# Patient Record
Sex: MALE | Race: WHITE | NOT HISPANIC OR LATINO | Employment: OTHER | URBAN - METROPOLITAN AREA
[De-identification: names, ages, dates, MRNs, and addresses within clinical notes are randomized per-mention and may not be internally consistent; named-entity substitution may affect disease eponyms.]

---

## 2018-02-08 ENCOUNTER — OFFICE VISIT (OUTPATIENT)
Dept: FAMILY MEDICINE CLINIC | Facility: CLINIC | Age: 83
End: 2018-02-08

## 2018-02-08 VITALS
DIASTOLIC BLOOD PRESSURE: 58 MMHG | OXYGEN SATURATION: 98 % | HEIGHT: 60 IN | BODY MASS INDEX: 35.03 KG/M2 | HEART RATE: 62 BPM | WEIGHT: 178.41 LBS | SYSTOLIC BLOOD PRESSURE: 112 MMHG

## 2018-02-08 DIAGNOSIS — E78.5 DYSLIPIDEMIA: ICD-10-CM

## 2018-02-08 DIAGNOSIS — I47.1 PAROXYSMAL SVT (SUPRAVENTRICULAR TACHYCARDIA) (HCC): ICD-10-CM

## 2018-02-08 DIAGNOSIS — I10 BENIGN ESSENTIAL HYPERTENSION: Primary | ICD-10-CM

## 2018-02-08 DIAGNOSIS — Z86.19 HISTORY OF HEPATITIS: ICD-10-CM

## 2018-02-08 PROCEDURE — 99213 OFFICE O/P EST LOW 20 MIN: CPT | Performed by: FAMILY MEDICINE

## 2018-02-08 RX ORDER — DIGOXIN 250 MCG
25 TABLET ORAL
COMMUNITY
End: 2018-05-25 | Stop reason: HOSPADM

## 2018-02-08 NOTE — PROGRESS NOTES
"Subjective   Mingo Aquino is a 83 y.o. male.     Chief Complaint   Patient presents with   • Hypertension     Fu Meds        History of Present Illness    Hypertension follow up. Doing well with current medication which he is taking as directed. No known high or low blood pressure episodes. No cardiovascular or neurological symptoms. Today's BP: 112/58 .He also continues to follow with his cardiologist.  They are prescribing the metoprolol.  He also has a history of SVT and remains on digoxin at low dose daily.  The medication record states he's on liquid digoxin, but this came over from the [employer] system I believe it is in error.  He states he's taking digoxin pills.    He gave blood sometime ago and stated that he was not allowed to give blood to other people because of a history of hepatitis.  No other information.  No symptoms.    Known history of hyperlipidemia some years ago.        The following portions of the patient's history were reviewed and updated as appropriate: allergies, current medications, past family history, past medical history, past social history, past surgical history and problem list.          Review of Systems   Constitutional: Negative.    Respiratory: Negative.    Cardiovascular: Negative.    Musculoskeletal: Negative.        Objective   Blood pressure 112/58, pulse 62, height 108 cm (42.52\"), weight 80.9 kg (178 lb 6.6 oz), SpO2 98 %.  Physical Exam   Constitutional: He appears well-developed and well-nourished. No distress.   Neck: No thyromegaly present.   Cardiovascular: Normal rate, regular rhythm, normal heart sounds and intact distal pulses.    Pulmonary/Chest: Effort normal and breath sounds normal.   Musculoskeletal: He exhibits no edema.   Skin: Skin is warm and dry.   Psychiatric: He has a normal mood and affect. His behavior is normal. Judgment and thought content normal.   Nursing note and vitals reviewed.      Assessment/Plan   Mingo was seen today for " hypertension.    Diagnoses and all orders for this visit:    Benign essential hypertension  -     Lipid Panel  -     Comprehensive Metabolic Panel  -     CBC & Differential    Paroxysmal SVT (supraventricular tachycardia)  -     CBC & Differential    History of hepatitis  -     Hepatitis B & C Profile  -     CBC & Differential    Dyslipidemia  -     Lipid Panel  -     Comprehensive Metabolic Panel  -     CBC & Differential      Hypertension.  Well-controlled.  Rectal SVT.  Control.  He continues with cardiology.  Next    History of hepatitis.  I'm checking hepatitis B and C profile today.  I'll see him back within 3 months for annual wellness visit

## 2018-02-12 DIAGNOSIS — B18.1 CHRONIC HEPATITIS B (HCC): Primary | ICD-10-CM

## 2018-02-12 LAB
ALBUMIN SERPL-MCNC: 4.5 G/DL (ref 3.5–5.2)
ALBUMIN/GLOB SERPL: 2 G/DL
ALP SERPL-CCNC: 65 U/L (ref 39–117)
ALT SERPL-CCNC: 16 U/L (ref 1–41)
AST SERPL-CCNC: 16 U/L (ref 1–40)
BASOPHILS # BLD AUTO: 0.18 10*3/MM3 (ref 0–0.2)
BASOPHILS NFR BLD AUTO: 1.9 % (ref 0–1.5)
BILIRUB SERPL-MCNC: 0.6 MG/DL (ref 0.1–1.2)
BUN SERPL-MCNC: 19 MG/DL (ref 8–23)
BUN/CREAT SERPL: 21.6 (ref 7–25)
CALCIUM SERPL-MCNC: 10.1 MG/DL (ref 8.6–10.5)
CHLORIDE SERPL-SCNC: 105 MMOL/L (ref 98–107)
CHOLEST SERPL-MCNC: 193 MG/DL (ref 0–200)
CO2 SERPL-SCNC: 26 MMOL/L (ref 22–29)
CREAT SERPL-MCNC: 0.88 MG/DL (ref 0.76–1.27)
EOSINOPHIL # BLD AUTO: 0.62 10*3/MM3 (ref 0–0.7)
EOSINOPHIL NFR BLD AUTO: 6.6 % (ref 0.3–6.2)
ERYTHROCYTE [DISTWIDTH] IN BLOOD BY AUTOMATED COUNT: 12.9 % (ref 11.5–14.5)
GFR SERPLBLD CREATININE-BSD FMLA CKD-EPI: 100 ML/MIN/1.73
GFR SERPLBLD CREATININE-BSD FMLA CKD-EPI: 83 ML/MIN/1.73
GLOBULIN SER CALC-MCNC: 2.2 GM/DL
GLUCOSE SERPL-MCNC: 80 MG/DL (ref 65–99)
HBV CORE AB SERPL QL IA: POSITIVE
HBV CORE IGM SERPL QL IA: NEGATIVE
HBV E AB SERPL QL IA: POSITIVE
HBV E AG SERPL QL IA: NEGATIVE
HBV SURFACE AB SER QL: REACTIVE
HBV SURFACE AG SERPL QL IA: NEGATIVE
HCT VFR BLD AUTO: 49.9 % (ref 40.4–52.2)
HCV AB S/CO SERPL IA: <0.1 S/CO RATIO (ref 0–0.9)
HDLC SERPL-MCNC: 42 MG/DL (ref 40–60)
HGB BLD-MCNC: 16 G/DL (ref 13.7–17.6)
IMM GRANULOCYTES # BLD: 0.11 10*3/MM3 (ref 0–0.03)
IMM GRANULOCYTES NFR BLD: 1.2 % (ref 0–0.5)
LABORATORY COMMENT REPORT: NORMAL
LDLC SERPL CALC-MCNC: 121 MG/DL (ref 0–100)
LYMPHOCYTES # BLD AUTO: 2.59 10*3/MM3 (ref 0.9–4.8)
LYMPHOCYTES NFR BLD AUTO: 27.7 % (ref 19.6–45.3)
MCH RBC QN AUTO: 31.5 PG (ref 27–32.7)
MCHC RBC AUTO-ENTMCNC: 32.1 G/DL (ref 32.6–36.4)
MCV RBC AUTO: 98.2 FL (ref 79.8–96.2)
MONOCYTES # BLD AUTO: 1.26 10*3/MM3 (ref 0.2–1.2)
MONOCYTES NFR BLD AUTO: 13.5 % (ref 5–12)
NEUTROPHILS # BLD AUTO: 4.59 10*3/MM3 (ref 1.9–8.1)
NEUTROPHILS NFR BLD AUTO: 49.1 % (ref 42.7–76)
PLATELET # BLD AUTO: 260 10*3/MM3 (ref 140–500)
POTASSIUM SERPL-SCNC: 4.9 MMOL/L (ref 3.5–5.2)
PROT SERPL-MCNC: 6.7 G/DL (ref 6–8.5)
RBC # BLD AUTO: 5.08 10*6/MM3 (ref 4.6–6)
SODIUM SERPL-SCNC: 144 MMOL/L (ref 136–145)
TRIGL SERPL-MCNC: 148 MG/DL (ref 0–150)
VLDLC SERPL CALC-MCNC: 29.6 MG/DL (ref 5–40)
WBC # BLD AUTO: 9.35 10*3/MM3 (ref 4.5–10.7)

## 2018-02-12 NOTE — PROGRESS NOTES
"Phone call patient.  Hepatitis B tests are positive for probable old, inactive, occult infection.  His LFTs are normal.  I'm ordering a hepatitis B DNA quantitative analysis.  In addition I did offer him a referral to a \"liver doctor\".  However he would like to wait on that.  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  "

## 2018-02-14 LAB
HBV DNA SERPL NAA+PROBE-ACNC: NORMAL IU/ML
Lab: NORMAL
REF LAB TEST REF RANGE: NORMAL
REQUEST PROBLEM: ABNORMAL
WRITTEN AUTHORIZATION: NORMAL

## 2018-02-17 ENCOUNTER — RESULTS ENCOUNTER (OUTPATIENT)
Dept: FAMILY MEDICINE CLINIC | Facility: CLINIC | Age: 83
End: 2018-02-17

## 2018-02-17 DIAGNOSIS — B18.1 CHRONIC HEPATITIS B (HCC): ICD-10-CM

## 2018-02-17 LAB
HBV DNA SERPL NAA+PROBE-ACNC: NORMAL IU/ML
HBV DNA SERPL NAA+PROBE-LOG IU: NORMAL LOG10IU/ML
REF LAB TEST REF RANGE: NORMAL

## 2018-02-19 NOTE — PROGRESS NOTES
The hepatitis B DNA level was essentially 0, not detected.  This means probable very old hepatitis B infection, non-active.  I recommend we continue to monitor hepatitis studies yearly.

## 2018-04-18 ENCOUNTER — TRANSCRIBE ORDERS (OUTPATIENT)
Dept: ADMINISTRATIVE | Facility: HOSPITAL | Age: 83
End: 2018-04-18

## 2018-04-18 ENCOUNTER — HOSPITAL ENCOUNTER (EMERGENCY)
Facility: HOSPITAL | Age: 83
Discharge: HOME | End: 2018-04-18
Attending: FAMILY MEDICINE | Admitting: FAMILY MEDICINE

## 2018-04-18 ENCOUNTER — APPOINTMENT (OUTPATIENT)
Dept: GENERAL RADIOLOGY | Facility: HOSPITAL | Age: 83
End: 2018-04-18

## 2018-04-18 ENCOUNTER — APPOINTMENT (OUTPATIENT)
Dept: CT IMAGING | Facility: HOSPITAL | Age: 83
End: 2018-04-18

## 2018-04-18 VITALS
DIASTOLIC BLOOD PRESSURE: 88 MMHG | SYSTOLIC BLOOD PRESSURE: 156 MMHG | HEART RATE: 56 BPM | HEIGHT: 72 IN | TEMPERATURE: 96.2 F | BODY MASS INDEX: 23.7 KG/M2 | RESPIRATION RATE: 21 BRPM | WEIGHT: 175 LBS | OXYGEN SATURATION: 96 %

## 2018-04-18 DIAGNOSIS — K86.89 PANCREATIC MASS: Primary | ICD-10-CM

## 2018-04-18 DIAGNOSIS — J40 BRONCHITIS: Primary | ICD-10-CM

## 2018-04-18 DIAGNOSIS — J32.9 CHRONIC SINUSITIS, UNSPECIFIED LOCATION: ICD-10-CM

## 2018-04-18 LAB
ALBUMIN SERPL-MCNC: 4 G/DL (ref 3.5–5.2)
ALBUMIN/GLOB SERPL: 1.3 G/DL
ALP SERPL-CCNC: 65 U/L (ref 39–117)
ALT SERPL W P-5'-P-CCNC: 15 U/L (ref 1–41)
ANION GAP SERPL CALCULATED.3IONS-SCNC: 9.8 MMOL/L
AST SERPL-CCNC: 14 U/L (ref 1–40)
B PERT DNA SPEC QL NAA+PROBE: NOT DETECTED
BASOPHILS # BLD AUTO: 0.19 10*3/MM3 (ref 0–0.2)
BASOPHILS NFR BLD AUTO: 1.7 % (ref 0–1.5)
BILIRUB SERPL-MCNC: 0.7 MG/DL (ref 0.1–1.2)
BUN BLD-MCNC: 19 MG/DL (ref 8–23)
BUN/CREAT SERPL: 21.8 (ref 7–25)
C PNEUM DNA NPH QL NAA+NON-PROBE: NOT DETECTED
CALCIUM SPEC-SCNC: 10 MG/DL (ref 8.6–10.5)
CHLORIDE SERPL-SCNC: 104 MMOL/L (ref 98–107)
CO2 SERPL-SCNC: 28.2 MMOL/L (ref 22–29)
CREAT BLD-MCNC: 0.87 MG/DL (ref 0.76–1.27)
DEPRECATED RDW RBC AUTO: 42.2 FL (ref 37–54)
EOSINOPHIL # BLD AUTO: 1.32 10*3/MM3 (ref 0–0.7)
EOSINOPHIL NFR BLD AUTO: 12 % (ref 0.3–6.2)
ERYTHROCYTE [DISTWIDTH] IN BLOOD BY AUTOMATED COUNT: 12.2 % (ref 11.5–14.5)
FLUAV H1 2009 PAND RNA NPH QL NAA+PROBE: NOT DETECTED
FLUAV H1 HA GENE NPH QL NAA+PROBE: NOT DETECTED
FLUAV H3 RNA NPH QL NAA+PROBE: NOT DETECTED
FLUAV SUBTYP SPEC NAA+PROBE: NOT DETECTED
FLUBV RNA ISLT QL NAA+PROBE: NOT DETECTED
GFR SERPL CREATININE-BSD FRML MDRD: 84 ML/MIN/1.73
GLOBULIN UR ELPH-MCNC: 3 GM/DL
GLUCOSE BLD-MCNC: 100 MG/DL (ref 65–99)
HADV DNA SPEC NAA+PROBE: NOT DETECTED
HCOV 229E RNA SPEC QL NAA+PROBE: NOT DETECTED
HCOV HKU1 RNA SPEC QL NAA+PROBE: NOT DETECTED
HCOV NL63 RNA SPEC QL NAA+PROBE: NOT DETECTED
HCOV OC43 RNA SPEC QL NAA+PROBE: NOT DETECTED
HCT VFR BLD AUTO: 51.7 % (ref 40.4–52.2)
HGB BLD-MCNC: 17 G/DL (ref 13.7–17.6)
HMPV RNA NPH QL NAA+NON-PROBE: NOT DETECTED
HOLD SPECIMEN: NORMAL
HOLD SPECIMEN: NORMAL
HPIV1 RNA SPEC QL NAA+PROBE: NOT DETECTED
HPIV2 RNA SPEC QL NAA+PROBE: NOT DETECTED
HPIV3 RNA NPH QL NAA+PROBE: NOT DETECTED
HPIV4 P GENE NPH QL NAA+PROBE: NOT DETECTED
IMM GRANULOCYTES # BLD: 0.05 10*3/MM3 (ref 0–0.03)
IMM GRANULOCYTES NFR BLD: 0.5 % (ref 0–0.5)
LYMPHOCYTES # BLD AUTO: 2.52 10*3/MM3 (ref 0.9–4.8)
LYMPHOCYTES NFR BLD AUTO: 23 % (ref 19.6–45.3)
M PNEUMO IGG SER IA-ACNC: NOT DETECTED
MCH RBC QN AUTO: 31.5 PG (ref 27–32.7)
MCHC RBC AUTO-ENTMCNC: 32.9 G/DL (ref 32.6–36.4)
MCV RBC AUTO: 95.7 FL (ref 79.8–96.2)
MONOCYTES # BLD AUTO: 1.01 10*3/MM3 (ref 0.2–1.2)
MONOCYTES NFR BLD AUTO: 9.2 % (ref 5–12)
NEUTROPHILS # BLD AUTO: 5.89 10*3/MM3 (ref 1.9–8.1)
NEUTROPHILS NFR BLD AUTO: 53.6 % (ref 42.7–76)
NT-PROBNP SERPL-MCNC: 162.1 PG/ML (ref 0–1800)
PLATELET # BLD AUTO: 252 10*3/MM3 (ref 140–500)
PMV BLD AUTO: 11.1 FL (ref 6–12)
POTASSIUM BLD-SCNC: 4.3 MMOL/L (ref 3.5–5.2)
PROT SERPL-MCNC: 7 G/DL (ref 6–8.5)
RBC # BLD AUTO: 5.4 10*6/MM3 (ref 4.6–6)
RHINOVIRUS RNA SPEC NAA+PROBE: NOT DETECTED
RSV RNA NPH QL NAA+NON-PROBE: NOT DETECTED
SODIUM BLD-SCNC: 142 MMOL/L (ref 136–145)
TROPONIN T SERPL-MCNC: <0.01 NG/ML (ref 0–0.03)
WBC NRBC COR # BLD: 10.98 10*3/MM3 (ref 4.5–10.7)
WHOLE BLOOD HOLD SPECIMEN: NORMAL
WHOLE BLOOD HOLD SPECIMEN: NORMAL

## 2018-04-18 PROCEDURE — 87581 M.PNEUMON DNA AMP PROBE: CPT | Performed by: FAMILY MEDICINE

## 2018-04-18 PROCEDURE — 87633 RESP VIRUS 12-25 TARGETS: CPT | Performed by: FAMILY MEDICINE

## 2018-04-18 PROCEDURE — 71046 X-RAY EXAM CHEST 2 VIEWS: CPT

## 2018-04-18 PROCEDURE — 93010 ELECTROCARDIOGRAM REPORT: CPT | Performed by: INTERNAL MEDICINE

## 2018-04-18 PROCEDURE — 84484 ASSAY OF TROPONIN QUANT: CPT | Performed by: FAMILY MEDICINE

## 2018-04-18 PROCEDURE — 83880 ASSAY OF NATRIURETIC PEPTIDE: CPT | Performed by: FAMILY MEDICINE

## 2018-04-18 PROCEDURE — 85025 COMPLETE CBC W/AUTO DIFF WBC: CPT | Performed by: FAMILY MEDICINE

## 2018-04-18 PROCEDURE — 94640 AIRWAY INHALATION TREATMENT: CPT

## 2018-04-18 PROCEDURE — 71260 CT THORAX DX C+: CPT

## 2018-04-18 PROCEDURE — 93005 ELECTROCARDIOGRAM TRACING: CPT | Performed by: FAMILY MEDICINE

## 2018-04-18 PROCEDURE — 93005 ELECTROCARDIOGRAM TRACING: CPT

## 2018-04-18 PROCEDURE — 99284 EMERGENCY DEPT VISIT MOD MDM: CPT

## 2018-04-18 PROCEDURE — 80053 COMPREHEN METABOLIC PANEL: CPT | Performed by: FAMILY MEDICINE

## 2018-04-18 PROCEDURE — 25010000002 IOPAMIDOL 61 % SOLUTION: Performed by: FAMILY MEDICINE

## 2018-04-18 PROCEDURE — 87798 DETECT AGENT NOS DNA AMP: CPT | Performed by: FAMILY MEDICINE

## 2018-04-18 PROCEDURE — 87486 CHLMYD PNEUM DNA AMP PROBE: CPT | Performed by: FAMILY MEDICINE

## 2018-04-18 RX ORDER — IPRATROPIUM BROMIDE 42 UG/1
SPRAY, METERED NASAL
COMMUNITY
Start: 2018-02-07 | End: 2018-05-22

## 2018-04-18 RX ORDER — IPRATROPIUM BROMIDE AND ALBUTEROL SULFATE 2.5; .5 MG/3ML; MG/3ML
3 SOLUTION RESPIRATORY (INHALATION) ONCE
Status: COMPLETED | OUTPATIENT
Start: 2018-04-18 | End: 2018-04-18

## 2018-04-18 RX ORDER — PREDNISONE 20 MG/1
20 TABLET ORAL 3 TIMES DAILY
Qty: 25 TABLET | Refills: 0 | Status: SHIPPED | OUTPATIENT
Start: 2018-04-18 | End: 2018-05-09

## 2018-04-18 RX ORDER — SODIUM CHLORIDE 0.9 % (FLUSH) 0.9 %
10 SYRINGE (ML) INJECTION AS NEEDED
Status: DISCONTINUED | OUTPATIENT
Start: 2018-04-18 | End: 2018-04-18 | Stop reason: HOSPADM

## 2018-04-18 RX ADMIN — IOPAMIDOL 75 ML: 612 INJECTION, SOLUTION INTRAVENOUS at 09:55

## 2018-04-18 RX ADMIN — IPRATROPIUM BROMIDE AND ALBUTEROL SULFATE 3 ML: .5; 3 SOLUTION RESPIRATORY (INHALATION) at 09:23

## 2018-04-18 NOTE — DISCHARGE INSTRUCTIONS
You should improve over the next few days.  If not, or you worsen or develop new symptoms, return or see Dr Luque promptly

## 2018-04-18 NOTE — ED PROVIDER NOTES
EMERGENCY DEPARTMENT ENCOUNTER    CHIEF COMPLAINT  Chief Complaint: SOA  History given by: pt   History limited by: none  Room Number: 34/34  PMD: MD Dr. Rebel Crespo, ENT  Dr. Sanders, urologist     HPI:  Pt is a 83 y.o. male who presents complaining of SOA and cough for the past month, worsened this AM. Pt states that his symptoms are worse on waking and his SOA and coughing seems to be improved with sitting up. Pt states that he saw an ENT several months ago with findings of a nasal fungus via MRI, that the ENT cleaned his sinuses out. Pt states that he had his packing removed 1 week after this procedure, and developed productive cough with brown sputum after having this packing removed. Pt also c/o difficulty sleeping due to his cough. Pt denies CP, abd pain, N/V. Pt states that he lives alone, and has an upcoming MRI to evaluate a pancreatis lesion next week. Pt denies a Hx of CHF.      Duration:  One month   Onset: gradual   Timing: constant   Quality: SOA   Intensity/Severity: moderate   Progression: worsened this AM  Associated Symptoms: cough, difficulty sleeping  Aggravating Factors: none stated   Alleviating Factors: none stated   Previous Episodes: no prior episodes reported   Treatment before arrival: Pt was seen by an ENT and had his sinuses cleaned.     PAST MEDICAL HISTORY  Active Ambulatory Problems     Diagnosis Date Noted   • APC (atrial premature contractions) 11/21/2012   • Benign essential hypertension 11/21/2012   • Dyslipidemia 01/14/2016   • Paroxysmal SVT (supraventricular tachycardia) 11/21/2012   • History of hepatitis 02/08/2018   • Chronic hepatitis B 02/12/2018     Resolved Ambulatory Problems     Diagnosis Date Noted   • No Resolved Ambulatory Problems     Past Medical History:   Diagnosis Date   • Arrhythmia    • Cancer    • Hypertension        PAST SURGICAL HISTORY  Past Surgical History:   Procedure Laterality Date   • HERNIA REPAIR     • NASAL SINUS SURGERY Right    •  PROSTATECTOMY         FAMILY HISTORY  History reviewed. No pertinent family history.    SOCIAL HISTORY  Social History     Social History   • Marital status: Single     Spouse name: N/A   • Number of children: N/A   • Years of education: N/A     Occupational History   • Not on file.     Social History Main Topics   • Smoking status: Never Smoker   • Smokeless tobacco: Never Used   • Alcohol use Yes      Comment: Occasional   • Drug use: No   • Sexual activity: Not on file     Other Topics Concern   • Not on file     Social History Narrative   • No narrative on file       ALLERGIES  Review of patient's allergies indicates no known allergies.    REVIEW OF SYSTEMS  Review of Systems   Constitutional: Negative for activity change, appetite change and fever.   HENT: Negative for congestion and sore throat.    Eyes: Negative.    Respiratory: Positive for cough (with brown sputum), chest tightness and shortness of breath.    Cardiovascular: Negative for chest pain and leg swelling.   Gastrointestinal: Negative for abdominal pain, diarrhea and vomiting.   Endocrine: Negative.    Genitourinary: Negative for decreased urine volume and dysuria.   Musculoskeletal: Negative for neck pain.   Skin: Negative for rash and wound.   Allergic/Immunologic: Negative.    Neurological: Negative for weakness, numbness and headaches.   Hematological: Negative.    Psychiatric/Behavioral: Positive for sleep disturbance (secondary to cough/SOA).   All other systems reviewed and are negative.      PHYSICAL EXAM  ED Triage Vitals   Temp Heart Rate Resp BP SpO2   04/18/18 0750 04/18/18 0750 04/18/18 0750 04/18/18 0755 04/18/18 0750   96.2 °F (35.7 °C) 99 20 152/96 92 %      Temp src Heart Rate Source Patient Position BP Location FiO2 (%)   04/18/18 0750 -- 04/18/18 0755 -- --   Tympanic  Sitting         Physical Exam   Constitutional: He is oriented to person, place, and time and well-developed, well-nourished, and in no distress.   HENT:   Head:  Normocephalic and atraumatic.   Eyes: EOM are normal. Pupils are equal, round, and reactive to light.   Neck: Normal range of motion. Neck supple.   Cardiovascular: Normal rate, regular rhythm and normal heart sounds.    Pulmonary/Chest: Effort normal. No respiratory distress. He has wheezes (bilateral).   Abdominal: Soft. There is no tenderness. There is no rebound and no guarding.   Musculoskeletal: Normal range of motion. He exhibits no edema.   Neurological: He is alert and oriented to person, place, and time. He has normal sensation and normal strength.   Skin: Skin is warm and dry.   Psychiatric: Mood and affect normal.   Nursing note and vitals reviewed.      LAB RESULTS  Lab Results (last 24 hours)     Procedure Component Value Units Date/Time    CBC & Differential [[id number]] Collected:  04/18/18 0907    Specimen:  Blood Updated:  04/18/18 0929    Narrative:       The following orders were created for panel order CBC & Differential.  Procedure                               Abnormality         Status                     ---------                               -----------         ------                     CBC Auto Differential[[id number]]        Abnormal            Final result                 Please view results for these tests on the individual orders.    Comprehensive Metabolic Panel [[id number]]  (Abnormal) Collected:  04/18/18 0907    Specimen:  Blood Updated:  04/18/18 0944     Glucose 100 (H) mg/dL      BUN 19 mg/dL      Creatinine 0.87 mg/dL      Sodium 142 mmol/L      Potassium 4.3 mmol/L      Chloride 104 mmol/L      CO2 28.2 mmol/L      Calcium 10.0 mg/dL      Total Protein 7.0 g/dL      Albumin 4.00 g/dL      ALT (SGPT) 15 U/L      AST (SGOT) 14 U/L      Alkaline Phosphatase 65 U/L      Total Bilirubin 0.7 mg/dL      eGFR Non African Amer 84 mL/min/1.73      Globulin 3.0 gm/dL      A/G Ratio 1.3 g/dL      BUN/Creatinine Ratio 21.8     Anion Gap 9.8 mmol/L     Narrative:       The MDRD GFR formula  is only valid for adults with stable renal function between ages 18 and 70.    BNP [[id number]]  (Normal) Collected:  04/18/18 0907    Specimen:  Blood Updated:  04/18/18 0943     proBNP 162.1 pg/mL     Narrative:       Among patients with dyspnea, NT-proBNP is highly sensitive for the detection of acute congestive heart failure. In addition NT-proBNP of <300 pg/ml effectively rules out acute congestive heart failure with 99% negative predictive value.    Troponin [[id number]]  (Normal) Collected:  04/18/18 0907    Specimen:  Blood Updated:  04/18/18 0944     Troponin T <0.010 ng/mL     Narrative:       Troponin T Reference Ranges:  Less than 0.03 ng/mL:    Negative for AMI  0.03 to 0.09 ng/mL:      Indeterminant for AMI  Greater than 0.09 ng/mL: Positive for AMI    CBC Auto Differential [[id number]]  (Abnormal) Collected:  04/18/18 0907    Specimen:  Blood Updated:  04/18/18 0929     WBC 10.98 (H) 10*3/mm3      RBC 5.40 10*6/mm3      Hemoglobin 17.0 g/dL      Hematocrit 51.7 %      MCV 95.7 fL      MCH 31.5 pg      MCHC 32.9 g/dL      RDW 12.2 %      RDW-SD 42.2 fl      MPV 11.1 fL      Platelets 252 10*3/mm3      Neutrophil % 53.6 %      Lymphocyte % 23.0 %      Monocyte % 9.2 %      Eosinophil % 12.0 (H) %      Basophil % 1.7 (H) %      Immature Grans % 0.5 %      Neutrophils, Absolute 5.89 10*3/mm3      Lymphocytes, Absolute 2.52 10*3/mm3      Monocytes, Absolute 1.01 10*3/mm3      Eosinophils, Absolute 1.32 (H) 10*3/mm3      Basophils, Absolute 0.19 10*3/mm3      Immature Grans, Absolute 0.05 (H) 10*3/mm3     Respiratory Panel, PCR - Swab, Nasopharynx [[id number]]  (Normal) Collected:  04/18/18 0919    Specimen:  Swab from Nasopharynx Updated:  04/18/18 1243     ADENOVIRUS, PCR Not Detected     Coronavirus 229E Not Detected     Coronavirus HKU1 Not Detected     Coronavirus NL63 Not Detected     Coronavirus OC43 Not Detected     Human Metapneumovirus Not Detected     Human Rhinovirus/Enterovirus Not Detected      Influenza B PCR Not Detected     Parainfluenza Virus 1 Not Detected     Parainfluenza Virus 2 Not Detected     Parainfluenza Virus 3 Not Detected     Parainfluenza Virus 4 Not Detected     Bordetella pertussis pcr Not Detected     Influenza A H1 2009 PCR Not Detected     Chlamydophila pneumoniae PCR Not Detected     Mycoplasma pneumo by PCR Not Detected     Influenza A PCR Not Detected     Influenza A H3 Not Detected     Influenza A H1 Not Detected     RSV, PCR Not Detected          I ordered the above labs and reviewed the results    RADIOLOGY  CT Chest With Contrast   Final Result   1. Mild bilateral hilar brenie enlargement is without clear etiology.   3-month follow-up CT could be obtained to evaluate for stability.   2. Old granulomatous disease. No infiltrate or pleural effusion.   3. Mild left greater than right basilar atelectasis.       Radiation dose reduction techniques were utilized, including automated   exposure control and exposure modulation based on body size.       This report was finalized on 4/18/2018 10:58 AM by Dr. Saud Delatorre MD.          XR Chest 2 View   Final Result       CXR: negative acute    I ordered the above noted radiological studies. Interpreted by radiologist. Reviewed by me in PACS.       PROCEDURES  Procedures  EKG           EKG time: 0759  Rhythm/Rate: sinus rhythm 77  P waves and CA: hypertrophic P waves   QRS, axis: LVH  ST and T waves: nonspecific ST changes      Interpreted Contemporaneously by me, independently viewed  changed compared to prior 7/24/06  LVH is more pronounced     PROGRESS AND CONSULTS  ED Course   0909  Ordered labs and CT chest for further evaluation.   0910  Ordered duo-neb for a breathing treatment.   1107  Rechecked pt, who is resting comfortably, and states that his SOA was mildly improved with the breathing treatment. Discussed the pt's CT chest, which showed bernie enlargement but no pneumonia, and that we are waiting on the pt's respiratory  panel. Plan to call pulmonology. Pt understands and agrees with the plan, and all questions were answered.   1243  Ordered call to pulmonology.   1247  Ordered call to ENT, Dr. Luque  1315  Received a call from Dr. Mayen and discussed pt's case. Dr. aMyen will have ENT call back after reviewing the pt's recent sinus surgery.   1345  Received a call from Dr. Luque and discussed pt's case. Dr. Luque said the patient had terrific sinusitis and his coughing was from drip down from above.  He suggested d/c on steroids for the pt's chronic sinusitis and arrange close follow up.  1349  Rechecked pt, who is resting comfortably. Discussed conversation with Dr. Luque. Plan to d/c the pt on steroids. Pt understands and agrees with the plan, and all questions were answered.     MEDICAL DECISION MAKING  Results were reviewed/discussed with the patient and they were also made aware of online access. Pt also made aware that some labs, such as cultures, will not be resulted during ER visit and follow up with PMD is necessary.     MDM  Number of Diagnoses or Management Options  Bronchitis:      Amount and/or Complexity of Data Reviewed  Clinical lab tests: ordered and reviewed (Respiratory Panel: negative,troponin <0.010, .1)  Tests in the radiology section of CPT®: ordered and reviewed (CT chest: hilar bernie enlargement. CXR: negative acute)  Tests in the medicine section of CPT®: reviewed and ordered (See procedures section for EKG)  Decide to obtain previous medical records or to obtain history from someone other than the patient: yes (Pt's records in EPIC. Records provided by patient. )  Review and summarize past medical records: yes (Reviewed report of pt's previous CT that showed a questionable pancreas lesion and patchy consolidation in the RUL. )  Discuss the patient with other providers: yes (Dr. Soria (ENT), Dr. Luque (ENT))    Patient Progress  Patient progress: stable         DIAGNOSIS  Final diagnoses:    Bronchitis   Chronic sinusitis, unspecified location       DISPOSITION  DISCHARGE    Patient discharged in stable condition.    Reviewed implications of results, diagnosis, meds, responsibility to follow up, warning signs and symptoms of possible worsening, potential complications and reasons to return to ER.    Patient/Family voiced understanding of above instructions.    Discussed plan for discharge, as there is no emergent indication for admission. Patient referred to primary care provider for BP management due to today's BP. Pt/family is agreeable and understands need for follow up and repeat testing.  Pt is aware that discharge does not mean that nothing is wrong but it indicates no emergency is present that requires admission and they must continue care with follow-up as given below or physician of their choice.     FOLLOW-UP  Petar Luque MD  [address] [address]  [phone]    Call in 1 week  For Follow up         Medication List      New Prescriptions    predniSONE 20 MG tablet  Commonly known as:  DELTASONE  Take 1 tablet by mouth 3 (Three) Times a Day. After 5 days go to 2 times a   day              Latest Documented Vital Signs:  As of 7:38 PM  BP- 156/88 HR- 56 Temp- 96.2 °F (35.7 °C) (Tympanic) O2 sat- 96%    --  Documentation assistance provided by mo Rawls for Dr. Rodriguez.  Information recorded by the scribe was done at my direction and has been verified and validated by me.         Iggy Rawls  04/18/18 5391       Silverio Rodriguez MD  04/18/18 7491

## 2018-04-23 ENCOUNTER — TELEPHONE (OUTPATIENT)
Dept: SOCIAL WORK | Facility: HOSPITAL | Age: 83
End: 2018-04-23

## 2018-04-23 NOTE — TELEPHONE ENCOUNTER
ER F/U phone call:   Pt stated that he is doing better. Had MRI on 4-26-18 and he is to follow up with his PCP on 5-9-18. No other questions or concerns voiced at this time. Sara Aguilar RN

## 2018-04-26 ENCOUNTER — APPOINTMENT (OUTPATIENT)
Dept: MRI IMAGING | Facility: HOSPITAL | Age: 83
End: 2018-04-26
Attending: UROLOGY

## 2018-05-09 ENCOUNTER — OFFICE VISIT (OUTPATIENT)
Dept: FAMILY MEDICINE CLINIC | Facility: CLINIC | Age: 83
End: 2018-05-09

## 2018-05-09 VITALS
BODY MASS INDEX: 22.5 KG/M2 | WEIGHT: 166.1 LBS | HEART RATE: 78 BPM | HEIGHT: 72 IN | OXYGEN SATURATION: 98 % | TEMPERATURE: 97.9 F | SYSTOLIC BLOOD PRESSURE: 100 MMHG | DIASTOLIC BLOOD PRESSURE: 56 MMHG

## 2018-05-09 DIAGNOSIS — I10 BENIGN ESSENTIAL HYPERTENSION: ICD-10-CM

## 2018-05-09 DIAGNOSIS — Z00.00 MEDICARE ANNUAL WELLNESS VISIT, SUBSEQUENT: Primary | ICD-10-CM

## 2018-05-09 DIAGNOSIS — D37.9 NEOPLASM OF UNCERTAIN BEHAVIOR OF DIGESTIVE ORGAN: ICD-10-CM

## 2018-05-09 DIAGNOSIS — I49.1 APC (ATRIAL PREMATURE CONTRACTIONS): ICD-10-CM

## 2018-05-09 DIAGNOSIS — B18.1 CHRONIC HEPATITIS B (HCC): ICD-10-CM

## 2018-05-09 DIAGNOSIS — F34.1 DYSTHYMIA: ICD-10-CM

## 2018-05-09 PROBLEM — K86.89 PANCREATIC MASS: Status: ACTIVE | Noted: 2018-05-09

## 2018-05-09 PROCEDURE — G0439 PPPS, SUBSEQ VISIT: HCPCS | Performed by: FAMILY MEDICINE

## 2018-05-09 PROCEDURE — 99213 OFFICE O/P EST LOW 20 MIN: CPT | Performed by: FAMILY MEDICINE

## 2018-05-09 PROCEDURE — G0009 ADMIN PNEUMOCOCCAL VACCINE: HCPCS | Performed by: FAMILY MEDICINE

## 2018-05-09 PROCEDURE — 90670 PCV13 VACCINE IM: CPT | Performed by: FAMILY MEDICINE

## 2018-05-09 NOTE — PROGRESS NOTES
Subjective   Mingo Aquino is a 83 y.o. male.     Annual Exam (AWV); Depression; and Fatigue    History of Present Illness     Seen by urology for possible kidney issue.  MRI of the abdomen was performed.  It showed some rash and able pancreatic cysts or masses.  He has an appointment with Dr. oCllier tomorrow.  Patient has had some weight loss he can't quite explain, about 10 pounds.  He has had some disc depression symptoms, see below.  He otherwise feels fine.  Good appetite.  No bowel movement changes.    Depressed mood.  Decreased interest in pleasurable activities.  He's feeling older and lonely he states.  He misses his family and friends.  However the movements do not seem to get in the way of day-to-day activities.  He still goes out with friends for lunch.  He is re-modeling his condo which he enjoys.  He's had some fatigue.    Hypertension follow up. Doing well with current medication which he is taking as directed. No known high or low blood pressure episodes. No cardiovascular or neurological symptoms. Today's BP: 100/56.      Last lipid panel:   Lab Results   Component Value Date    HDL 42 02/08/2018     Lab Results   Component Value Date     (H) 02/08/2018     Lab Results   Component Value Date    TRIG 148 02/08/2018     He continues his metoprolol also for his PACs.    He was in the emergency room recently for severe bronchitis episode.  White count was slightly elevated with high eosinophils.  Questionable allergies.  Otherwise workup was negative.  CT scan of the chest demonstrated some bilateral hilar enlargement without clear etiology.  Questionable need for three-month follow-up..      The following portions of the patient's history were reviewed and updated as appropriate: allergies, current medications, past family history, past medical history, past social history, past surgical history and problem list.      Review of Systems   Constitutional: Positive for unexpected weight  "change.   Respiratory: Negative.    Cardiovascular: Negative.    Musculoskeletal: Negative.        Objective   Blood pressure 100/56, pulse 78, temperature 97.9 °F (36.6 °C), temperature source Oral, height 182.9 cm (72.01\"), weight 75.3 kg (166 lb 1.6 oz), SpO2 98 %.  Physical Exam   Constitutional: He appears well-developed and well-nourished. No distress.   Neck: No thyromegaly present.   Cardiovascular: Normal rate, regular rhythm, normal heart sounds and intact distal pulses.    Pulmonary/Chest: Effort normal and breath sounds normal.   Abdominal: Soft. Bowel sounds are normal. He exhibits no distension and no mass. There is no tenderness. There is no rebound and no guarding. No hernia.   Musculoskeletal: He exhibits no edema.   Skin: Skin is warm and dry.   Psychiatric: His behavior is normal. Judgment and thought content normal.   Affect slightly flat.  Mood is not depressed today.   Nursing note and vitals reviewed.      Assessment/Plan   Mingo was seen today for annual exam, depression and fatigue.    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  Stable.    PACs.  Well-controlled metoprolol.    Chronic hepatitis B, old infection.  LFTs normal.  DNA load not detected.    Pancreatic neoplasms.  Has appointment coming up with surgery tomorrow.  I'm ordering a tumor marker today along with repeat CBC.    Recent bronchitis.  White count was slightly elevated.  Repeat CBC.  Rechecking eosinophil count which was elevated in the emergency room.    Dysthymia.  Possible depression.  Patient is not interested in medication today.  Counseling given in great detail today.  " Recommend talk therapy with a counselor or friends, recommend exercise, recommend repeat visit in 6 weeks.

## 2018-05-09 NOTE — PROGRESS NOTES
QUICK REFERENCE INFORMATION:  The ABCs of the Annual Wellness Visit    Subsequent Medicare Wellness Visit    HEALTH RISK ASSESSMENT    1934    Recent Hospitalizations:  No hospitalization(s) within the last year..        Current Medical Providers:  Patient Care Team:  Adolfo Adame MD as PCP - General (Family Medicine)        Smoking Status:  History   Smoking Status   • Never Smoker   Smokeless Tobacco   • Never Used       Alcohol Consumption:  History   Alcohol Use   • Yes     Comment: Occasional       Depression Screen:   PHQ-2/PHQ-9 Depression Screening 5/9/2018   Little interest or pleasure in doing things 3   Feeling down, depressed, or hopeless 1   Trouble falling or staying asleep, or sleeping too much 3   Feeling tired or having little energy 3   Poor appetite or overeating 3   Feeling bad about yourself - or that you are a failure or have let yourself or your family down 0   Trouble concentrating on things, such as reading the newspaper or watching television 0   Moving or speaking so slowly that other people could have noticed. Or the opposite - being so fidgety or restless that you have been moving around a lot more than usual 0   Thoughts that you would be better off dead, or of hurting yourself in some way 0   Total Score 13   If you checked off any problems, how difficult have these problems made it for you to do your work, take care of things at home, or get along with other people? Not difficult at all       Health Habits and Functional and Cognitive Screening:  Functional & Cognitive Status 5/9/2018   Do you have difficulty preparing food and eating? No   Do you have difficulty bathing yourself, getting dressed or grooming yourself? No   Do you have difficulty using the toilet? No   Do you have difficulty moving around from place to place? No   Do you have trouble with steps or getting out of a bed or a chair? No   In the past year have you fallen or experienced a near fall? No   Current Diet  Well Balanced Diet   Dental Exam Up to date   Eye Exam Up to date   Exercise (times per week) 0 times per week   Current Exercise Activities Include None   Do you need help using the phone?  No   Are you deaf or do you have serious difficulty hearing?  Yes   Do you need help with transportation? No   Do you need help shopping? No   Do you need help preparing meals?  No   Do you need help with housework?  No   Do you need help with laundry? No   Do you need help taking your medications? No   Do you need help managing money? No   Do you ever drive or ride in a car without wearing a seat belt? No   Have you felt unusual stress, anger or loneliness in the last month? Yes   Who do you live with? Alone   If you need help, do you have trouble finding someone available to you? No   Have you been bothered in the last four weeks by sexual problems? No   Do you have difficulty concentrating, remembering or making decisions? No           Does the patient have evidence of cognitive impairment? No    Aspirin use counseling: Does not need ASA (and currently is not on it)      Recent Lab Results:  CMP:  Lab Results   Component Value Date    GLU 80 02/08/2018    BUN 19 04/18/2018    CREATININE 0.87 04/18/2018    EGFRIFNONA 84 04/18/2018    EGFRIFAFRI 100 02/08/2018    BCR 21.8 04/18/2018     04/18/2018    K 4.3 04/18/2018    CO2 28.2 04/18/2018    CALCIUM 10.0 04/18/2018    PROTENTOTREF 6.7 02/08/2018    ALBUMIN 4.00 04/18/2018    LABGLOBREF 2.2 02/08/2018    LABIL2 1.3 04/18/2018    BILITOT 0.7 04/18/2018    ALKPHOS 65 04/18/2018    AST 14 04/18/2018    ALT 15 04/18/2018     Lipid Panel:  Lab Results   Component Value Date    TRIG 148 02/08/2018    HDL 42 02/08/2018    VLDL 29.6 02/08/2018     HbA1c:       Visual Acuity:  No exam data present    Age-appropriate Screening Schedule:  Refer to the list below for future screening recommendations based on patient's age, sex and/or medical conditions. Orders for these recommended  tests are listed in the plan section. The patient has been provided with a written plan.    Health Maintenance   Topic Date Due   • TDAP/TD VACCINES (1 - Tdap) 08/02/1953   • PNEUMOCOCCAL VACCINES (65+ LOW/MEDIUM RISK) (1 of 2 - PCV13) 08/02/1999   • ZOSTER VACCINE  02/08/2018   • INFLUENZA VACCINE  08/01/2018        Immunization History   Administered Date(s) Administered   • Influenza, Quadrivalent 10/12/2016   • Pneumococcal Conjugate 13-Valent (PCV13) 05/09/2018         Subjective   History of Present Illness    Mingo Aquino is a 83 y.o. male who presents for an Subsequent Wellness Visit.    The following portions of the patient's history were reviewed and updated as appropriate: allergies, current medications, past family history, past medical history, past social history, past surgical history and problem list.    Outpatient Medications Prior to Visit   Medication Sig Dispense Refill   • digoxin (LANOXIN) 0.05 MG/ML solution Take 25 mg by mouth Daily.     • ipratropium (ATROVENT) 0.06 % nasal spray INSTILL 2 SPRAYS IEN UP TO PRN     • metoprolol tartrate (LOPRESSOR) 25 MG tablet Take 25 mg by mouth 2 (Two) Times a Day.     • amoxicillin-clavulanate (AUGMENTIN) 500-125 MG per tablet Take 1 tablet by mouth 3 (Three) Times a Day. 30 tablet 0   • predniSONE (DELTASONE) 20 MG tablet Take 1 tablet by mouth 3 (Three) Times a Day. After 5 days go to 2 times a day 25 tablet 0     No facility-administered medications prior to visit.        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 Have asked the patient to send this to us to add to record.    Identification of Risk  "Factors:  Risk factors include: cardiovascular risk.    Review of Systems    Compared to one year ago, the patient feels his physical health is the same.  Compared to one year ago, the patient feels his mental health is the same.    Objective     Physical Exam    Vitals:    05/09/18 1306   BP: 100/56   Pulse: 78   Temp: 97.9 °F (36.6 °C)   TempSrc: Oral   SpO2: 98%   Weight: 75.3 kg (166 lb 1.6 oz)   Height: 182.9 cm (72.01\")       Patient's Body mass index is 22.52 kg/m². BMI is within normal parameters. No follow-up required.      Assessment/Plan   Patient Self-Management and Personalized Health Advice  The patient has been provided with information about: exercise, designing advance directives and mental health concerns and preventive services including:   · Advance directive, Pneumococcal vaccine .    Visit Diagnoses:    ICD-10-CM ICD-9-CM   1. Medicare annual wellness visit, subsequent Z00.00 V70.0   2. Benign essential hypertension I10 401.1   3. APC (atrial premature contractions) I49.1 427.61   4. Chronic hepatitis B B18.1 070.32   5. 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 Dysthymia F34.1 300.4       Orders Placed This Encounter   Procedures   • Pneumococcal Conjugate Vaccine 13-Valent All   • Cancer Antigen 19-9   • CBC & Differential     Order Specific Question:   Manual Differential     Answer:   No       Outpatient Encounter Prescriptions as of 5/9/2018   Medication Sig Dispense Refill   • digoxin (LANOXIN) 0.05 MG/ML solution Take 25 mg by mouth Daily.     • ipratropium (ATROVENT) 0.06 % nasal spray INSTILL 2 SPRAYS IEN UP TO PRN     • metoprolol tartrate (LOPRESSOR) 25 MG tablet Take 25 mg by mouth 2 (Two) Times a Day.     • [DISCONTINUED] amoxicillin-clavulanate (AUGMENTIN) 500-125 MG per tablet Take 1 tablet by mouth 3 (Three) Times a Day. 30 tablet 0   • [DISCONTINUED] predniSONE (DELTASONE) 20 MG tablet Take 1 tablet by mouth 3 (Three) Times a Day. After 5 days go to 2 " times a day 25 tablet 0     No facility-administered encounter medications on file as of 5/9/2018.        Reviewed use of high risk medication in the elderly: yes  Reviewed for potential of harmful drug interactions in the elderly: yes    Follow Up:  Return in about 6 weeks (around 6/20/2018) for Recheck 1/2 hour.     An After Visit Summary and PPPS with all of these plans were given to the patient.

## 2018-05-10 ENCOUNTER — OFFICE VISIT (OUTPATIENT)
Dept: SURGERY | Facility: CLINIC | Age: 83
End: 2018-05-10

## 2018-05-10 VITALS — HEART RATE: 70 BPM | BODY MASS INDEX: 22.75 KG/M2 | HEIGHT: 72 IN | WEIGHT: 168 LBS | OXYGEN SATURATION: 98 %

## 2018-05-10 DIAGNOSIS — Z12.11 SCREEN FOR COLON CANCER: Primary | ICD-10-CM

## 2018-05-10 DIAGNOSIS — K86.89 PANCREATIC MASS: ICD-10-CM

## 2018-05-10 LAB
BASOPHILS # BLD AUTO: 0.09 10*3/MM3 (ref 0–0.2)
BASOPHILS NFR BLD AUTO: 1.2 % (ref 0–1.5)
CANCER AG19-9 SERPL-ACNC: 14 U/ML (ref 0–35)
EOSINOPHIL # BLD AUTO: 0.47 10*3/MM3 (ref 0–0.7)
EOSINOPHIL NFR BLD AUTO: 6.3 % (ref 0.3–6.2)
ERYTHROCYTE [DISTWIDTH] IN BLOOD BY AUTOMATED COUNT: 12.4 % (ref 11.5–14.5)
HCT VFR BLD AUTO: 44.9 % (ref 40.4–52.2)
HGB BLD-MCNC: 14.3 G/DL (ref 13.7–17.6)
IMM GRANULOCYTES # BLD: 0.11 10*3/MM3 (ref 0–0.03)
IMM GRANULOCYTES NFR BLD: 1.5 % (ref 0–0.5)
LYMPHOCYTES # BLD AUTO: 2.22 10*3/MM3 (ref 0.9–4.8)
LYMPHOCYTES NFR BLD AUTO: 29.8 % (ref 19.6–45.3)
MCH RBC QN AUTO: 31.4 PG (ref 27–32.7)
MCHC RBC AUTO-ENTMCNC: 31.8 G/DL (ref 32.6–36.4)
MCV RBC AUTO: 98.5 FL (ref 79.8–96.2)
MONOCYTES # BLD AUTO: 0.71 10*3/MM3 (ref 0.2–1.2)
MONOCYTES NFR BLD AUTO: 9.5 % (ref 5–12)
NEUTROPHILS # BLD AUTO: 3.97 10*3/MM3 (ref 1.9–8.1)
NEUTROPHILS NFR BLD AUTO: 53.2 % (ref 42.7–76)
PLATELET # BLD AUTO: 301 10*3/MM3 (ref 140–500)
RBC # BLD AUTO: 4.56 10*6/MM3 (ref 4.6–6)
WBC # BLD AUTO: 7.46 10*3/MM3 (ref 4.5–10.7)

## 2018-05-10 PROCEDURE — 99202 OFFICE O/P NEW SF 15 MIN: CPT | Performed by: SURGERY

## 2018-05-10 NOTE — PROGRESS NOTES
Please let patient know blood tests were normal including the CA 19-9.  He will keep his appointment with Dr. Collier.

## 2018-05-11 NOTE — PROGRESS NOTES
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  Given the nature of the findings on CT and MRI, I don't feel any further workup or follow-up at his age is warranted.    He is greater than 5 years out from his last screening colonoscopy, and I think given his overall health and family history of colon cancer in his mother he would be well served to have screening colonoscopy.  I did discuss with him cologuard testing and barium enema as other options, he wishes to proceed with colonoscopy.      CC:    Abnormal radiographic studies    HPI:    83-year-old gentleman referred for consultation regarding abnormal CT scan and MRI.  He has no symptoms of abdominal pain, nausea, or vomiting.    RADIOLOGY/ENDOSCOPY:    -CT chest [facility] 4/18/2018: On my review the images the visualized pancreas demonstrates at least 2 very small cystic lesions that are not concerning in appearance  -CT abdomen pelvis 4/3/2018 demonstrated an 11 mm hypervascular focus in the pancreatic head which was not definitively felt to be a nodule  -MRI abdomen at [facility] on 4/21/2018 demonstrated a few scattered punctate cyst within the pancreas, likely reflecting small side branch intraductal papillary mucinous neoplasms, largest measuring 5 mm.    FAMILY HISTORY:  Mother with colon cancer    LABS:  CA-19-9: 14    PHYSICAL EXAM:   Constitutional: Well-developed well-nourished, no acute distress  Vital signs: Heart rate 70, weight 160 pounds, height 72 inches, BMI 22.8  Gastrointestinal: Soft, nontender, no palpable mass, no hepatosplenomegaly, negative for hernia, bowel sounds normal  Lymphatics (palpable nodes):  cervical-negative  Musculoskeletal: Symmetric strength, normal gait  Psychiatric: Alert and oriented ×3, normal affect     DEJA CAMPOS M.D.

## 2018-05-22 RX ORDER — IPRATROPIUM BROMIDE 42 UG/1
2 SPRAY, METERED NASAL AS NEEDED
COMMUNITY
End: 2021-06-03

## 2018-05-23 ENCOUNTER — ANESTHESIA (OUTPATIENT)
Dept: GASTROENTEROLOGY | Facility: HOSPITAL | Age: 83
End: 2018-05-23

## 2018-05-23 ENCOUNTER — ANESTHESIA EVENT (OUTPATIENT)
Dept: GASTROENTEROLOGY | Facility: HOSPITAL | Age: 83
End: 2018-05-23

## 2018-05-23 ENCOUNTER — HOSPITAL ENCOUNTER (OUTPATIENT)
Facility: HOSPITAL | Age: 83
Setting detail: HOSPITAL OUTPATIENT SURGERY
Discharge: HOME | End: 2018-05-23
Attending: SURGERY | Admitting: SURGERY

## 2018-05-23 VITALS
TEMPERATURE: 97.6 F | WEIGHT: 165.7 LBS | OXYGEN SATURATION: 98 % | SYSTOLIC BLOOD PRESSURE: 119 MMHG | DIASTOLIC BLOOD PRESSURE: 68 MMHG | HEIGHT: 72 IN | RESPIRATION RATE: 16 BRPM | BODY MASS INDEX: 22.44 KG/M2 | HEART RATE: 41 BPM

## 2018-05-23 PROCEDURE — 25010000002 PROPOFOL 10 MG/ML EMULSION: Performed by: NURSE ANESTHETIST, CERTIFIED REGISTERED

## 2018-05-23 PROCEDURE — 45378 DIAGNOSTIC COLONOSCOPY: CPT | Performed by: SURGERY

## 2018-05-23 RX ORDER — LIDOCAINE HYDROCHLORIDE 20 MG/ML
INJECTION, SOLUTION INFILTRATION; PERINEURAL AS NEEDED
Status: DISCONTINUED | OUTPATIENT
Start: 2018-05-23 | End: 2018-05-23 | Stop reason: SURG

## 2018-05-23 RX ORDER — SODIUM CHLORIDE 0.9 % (FLUSH) 0.9 %
3 SYRINGE (ML) INJECTION AS NEEDED
Status: DISCONTINUED | OUTPATIENT
Start: 2018-05-23 | End: 2018-05-25 | Stop reason: HOSPADM

## 2018-05-23 RX ORDER — SODIUM CHLORIDE, SODIUM LACTATE, POTASSIUM CHLORIDE, CALCIUM CHLORIDE 600; 310; 30; 20 MG/100ML; MG/100ML; MG/100ML; MG/100ML
1000 INJECTION, SOLUTION INTRAVENOUS CONTINUOUS
Status: ACTIVE | OUTPATIENT
Start: 2018-05-23 | End: 2018-05-25

## 2018-05-23 RX ORDER — PROPOFOL 10 MG/ML
VIAL (ML) INTRAVENOUS CONTINUOUS PRN
Status: DISCONTINUED | OUTPATIENT
Start: 2018-05-23 | End: 2018-05-23 | Stop reason: SURG

## 2018-05-23 RX ORDER — LIDOCAINE HYDROCHLORIDE 10 MG/ML
0.5 INJECTION, SOLUTION INFILTRATION; PERINEURAL ONCE AS NEEDED
Status: DISCONTINUED | OUTPATIENT
Start: 2018-05-23 | End: 2018-05-25 | Stop reason: HOSPADM

## 2018-05-23 RX ORDER — PROPOFOL 10 MG/ML
VIAL (ML) INTRAVENOUS AS NEEDED
Status: DISCONTINUED | OUTPATIENT
Start: 2018-05-23 | End: 2018-05-23 | Stop reason: SURG

## 2018-05-23 RX ADMIN — PROPOFOL 100 MG: 10 INJECTION, EMULSION INTRAVENOUS at 14:13

## 2018-05-23 RX ADMIN — PROPOFOL 50 MG: 10 INJECTION, EMULSION INTRAVENOUS at 14:14

## 2018-05-23 RX ADMIN — LIDOCAINE HYDROCHLORIDE 60 MG: 20 INJECTION, SOLUTION INFILTRATION; PERINEURAL at 14:14

## 2018-05-23 RX ADMIN — PROPOFOL 300 MCG/KG/MIN: 10 INJECTION, EMULSION INTRAVENOUS at 14:15

## 2018-05-23 RX ADMIN — SODIUM CHLORIDE, POTASSIUM CHLORIDE, SODIUM LACTATE AND CALCIUM CHLORIDE 1000 ML: 600; 310; 30; 20 INJECTION, SOLUTION INTRAVENOUS at 13:14

## 2018-05-23 NOTE — DISCHARGE INSTRUCTIONS
Colonoscopy, Adult, Care After  DR CAMPOS [phone]    This sheet gives you information about how to care for yourself after your procedure. Your health care provider may also give you more specific instructions. If you have problems or questions, contact your health care provider.  What can I expect after the procedure?  After the procedure, it is common to have:  · A small amount of blood in your stool for 24 hours after the procedure.  · Some gas.  · Mild abdominal cramping or bloating.  Follow these instructions at home:  General instructions     · For the first 24 hours after the procedure:  ¨ Do not drive or use machinery.  ¨ Do not sign important documents.  ¨ Do not drink alcohol.  ¨ Do your regular daily activities at a slower pace than normal.  ¨ Eat soft, easy-to-digest foods.  ¨ Rest often.  · Take over-the-counter or prescription medicines only as told by your health care provider.  · It is up to you to get the results of your procedure. Ask your health care provider, or the department performing the procedure, when your results will be ready.  Relieving cramping and bloating   · Try walking around when you have cramps or feel bloated.  · Apply heat to your abdomen as told by your health care provider. Use a heat source that your health care provider recommends, such as a moist heat pack or a heating pad.  ¨ Place a towel between your skin and the heat source.  ¨ Leave the heat on for 20-30 minutes.  ¨ Remove the heat if your skin turns bright red. This is especially important if you are unable to feel pain, heat, or cold. You may have a greater risk of getting burned.  Eating and drinking   · Drink enough fluid to keep your urine clear or pale yellow.  · Resume your normal diet as instructed by your health care provider. Avoid heavy or fried foods that are hard to digest.  · Avoid drinking alcohol for as long as instructed by your health care provider.  Contact a health care provider if:  · You have  blood in your stool 2-3 days after the procedure.  Get help right away if:  · You have more than a small spotting of blood in your stool.  · You pass large blood clots in your stool.  · Your abdomen is swollen.  · You have nausea or vomiting.  · You have a fever.  · You have increasing abdominal pain that is not relieved with medicine.  This information is not intended to replace advice given to you by your health care provider. Make sure you discuss any questions you have with your health care provider.  Document Released: 08/01/2005 Document Revised: 09/11/2017 Document Reviewed: 02/28/2017  [employer] Interactive Patient Education © 2017 [employer]      Diverticulosis  Diverticulosis is a condition that develops when small pouches (diverticula) form in the wall of the large intestine (colon). The colon is where water is absorbed and stool is formed. The pouches form when the inside layer of the colon pushes through weak spots in the outer layers of the colon. You may have a few pouches or many of them.  What are the causes?  The cause of this condition is not known.  What increases the risk?  The following factors may make you more likely to develop this condition:  · Being older than age 60. Your risk for this condition increases with age. Diverticulosis is rare among people younger than age 30. By age 80, many people have it.  · Eating a low-fiber diet.  · Having frequent constipation.  · Being overweight.  · Not getting enough exercise.  · Smoking.  · Taking over-the-counter pain medicines, like aspirin and ibuprofen.  · Having a family history of diverticulosis.  What are the signs or symptoms?  In most people, there are no symptoms of this condition. If you do have symptoms, they may include:  · Bloating.  · Cramps in the abdomen.  · Constipation or diarrhea.  · Pain in the lower left side of the abdomen.  How is this diagnosed?  This condition is most often diagnosed during an exam for other colon  problems. Because diverticulosis usually has no symptoms, it often cannot be diagnosed independently. This condition may be diagnosed by:  · Using a flexible scope to examine the colon (colonoscopy).  · Taking an X-ray of the colon after dye has been put into the colon (barium enema).  · Doing a CT scan.  How is this treated?  You may not need treatment for this condition if you have never developed an infection related to diverticulosis. If you have had an infection before, treatment may include:  · Eating a high-fiber diet. This may include eating more fruits, vegetables, and grains.  · Taking a fiber supplement.  · Taking a live bacteria supplement (probiotic).  · Taking medicine to relax your colon.  · Taking antibiotic medicines.  Follow these instructions at home:  · Drink 6-8 glasses of water or more each day to prevent constipation.  · Try not to strain when you have a bowel movement.  · If you have had an infection before:  ¨ Eat more fiber as directed by your health care provider or your diet and nutrition specialist (dietitian).  ¨ Take a fiber supplement or probiotic, if your health care provider approves.  · Take over-the-counter and prescription medicines only as told by your health care provider.  · If you were prescribed an antibiotic, take it as told by your health care provider. Do not stop taking the antibiotic even if you start to feel better.  · Keep all follow-up visits as told by your health care provider. This is important.  Contact a health care provider if:  · You have pain in your abdomen.  · You have bloating.  · You have cramps.  · You have not had a bowel movement in 3 days.  Get help right away if:  · Your pain gets worse.  · Your bloating becomes very bad.  · You have a fever or chills, and your symptoms suddenly get worse.  · You vomit.  · You have bowel movements that are bloody or black.  · You have bleeding from your rectum.  Summary  · Diverticulosis is a condition that develops  when small pouches (diverticula) form in the wall of the large intestine (colon).  · You may have a few pouches or many of them.  · This condition is most often diagnosed during an exam for other colon problems.  · If you have had an infection related to diverticulosis, treatment may include increasing the fiber in your diet, taking supplements, or taking medicines.  This information is not intended to replace advice given to you by your health care provider. Make sure you discuss any questions you have with your health care provider.  Document Released: 09/14/2005 Document Revised: 11/06/2017 Document Reviewed: 11/06/2017  [employer] Interactive Patient Education © 2017 [employer]

## 2018-05-23 NOTE — H&P (VIEW-ONLY)
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  Given the nature of the findings on CT and MRI, I don't feel any further workup or follow-up at his age is warranted.    He is greater than 5 years out from his last screening colonoscopy, and I think given his overall health and family history of colon cancer in his mother he would be well served to have screening colonoscopy.  I did discuss with him cologuard testing and barium enema as other options, he wishes to proceed with colonoscopy.      CC:    Abnormal radiographic studies    HPI:    83-year-old gentleman referred for consultation regarding abnormal CT scan and MRI.  He has no symptoms of abdominal pain, nausea, or vomiting.    RADIOLOGY/ENDOSCOPY:    -CT chest [facility] 4/18/2018: On my review the images the visualized pancreas demonstrates at least 2 very small cystic lesions that are not concerning in appearance  -CT abdomen pelvis 4/3/2018 demonstrated an 11 mm hypervascular focus in the pancreatic head which was not definitively felt to be a nodule  -MRI abdomen at [facility] on 4/21/2018 demonstrated a few scattered punctate cyst within the pancreas, likely reflecting small side branch intraductal papillary mucinous neoplasms, largest measuring 5 mm.    FAMILY HISTORY:  Mother with colon cancer    LABS:  CA-19-9: 14    PHYSICAL EXAM:   Constitutional: Well-developed well-nourished, no acute distress  Vital signs: Heart rate 70, weight 160 pounds, height 72 inches, BMI 22.8  Gastrointestinal: Soft, nontender, no palpable mass, no hepatosplenomegaly, negative for hernia, bowel sounds normal  Lymphatics (palpable nodes):  cervical-negative  Musculoskeletal: Symmetric strength, normal gait  Psychiatric: Alert and oriented ×3, normal affect     DEJA CAMPOS M.D.

## 2018-05-23 NOTE — ANESTHESIA POSTPROCEDURE EVALUATION
"Patient: Mingo Aquino    Procedure Summary     Date:  05/23/18 Room / Location:   [facility] /  [facility]    Anesthesia Start:  1411 Anesthesia Stop:  1434    Procedure:  COLONOSCOPY to CECUM (N/A ) Diagnosis:       Screen for colon cancer      Pancreatic mass      (Screen for colon cancer [Z12.11])      (Pancreatic mass [K86.9])    Surgeon:  Camacho Collier MD Provider:  Anna Albarado MD    Anesthesia Type:  MAC ASA Status:  2          Anesthesia Type: MAC  Last vitals  BP   (!) 82/51 (05/23/18 1447)   Temp   36.7 °C (98.1 °F) (05/23/18 1306)   Pulse   57 (05/23/18 1447)   Resp   16 (05/23/18 1447)     SpO2   96 % (05/23/18 1447)     Post Anesthesia Care and Evaluation    Patient location during evaluation: bedside  Patient participation: complete - patient participated  Level of consciousness: awake and alert  Pain management: adequate  Airway patency: patent  Anesthetic complications: No anesthetic complications  PONV Status: none  Cardiovascular status: acceptable  Respiratory status: acceptable  Hydration status: acceptable    Comments: BP (!) 82/51 (BP Location: Left arm, Patient Position: Lying)   Pulse 57   Temp 36.7 °C (98.1 °F) (Oral)   Resp 16   Ht 182.9 cm (72\")   Wt 75.2 kg (165 lb 11.2 oz)   SpO2 96%   BMI 22.47 kg/m²         "

## 2018-05-23 NOTE — OP NOTE
PREOPERATIVE DIAGNOSIS:  Screening    POSTOPERATIVE DIAGNOSIS AND FINDINGS:  Normal mucosa  Diverticulosis    PROCEDURE:  Colonoscopy to cecum     SURGEON:  Camacho Collier MD    ANESTHESIA:  MAC    SPECIMEN(S):  none    DESCRIPTION:  In decubitus position digital rectal exam was normal. Colonoscope inserted under direct visualization of lumen to cecum confirmed by visualization of ileocecal valve and appendiceal orifice.    Scope slowly withdrawn circumferentially examining all mucosal surfaces.    Quality of bowel preparation good.    There were no mucosal abnormalities.     Scattered diverticulosis seen.    Tolerated well.    Camacho Collier M.D.

## 2018-05-23 NOTE — ANESTHESIA PREPROCEDURE EVALUATION
Anesthesia Evaluation     Patient summary reviewed   NPO Solid Status: > 8 hours             Airway   Mallampati: II  TM distance: >3 FB  Dental      Pulmonary    Cardiovascular     Rhythm: regular  Rate: normal    (+) hypertension,       Neuro/Psych  GI/Hepatic/Renal/Endo    (+)   hepatitis B,     Musculoskeletal     Abdominal    Substance History      OB/GYN          Other      history of cancer                    Anesthesia Plan    ASA 2     MAC   total IV anesthesia  Anesthetic plan and risks discussed with patient.

## 2018-07-09 ENCOUNTER — OFFICE VISIT (OUTPATIENT)
Dept: FAMILY MEDICINE CLINIC | Facility: CLINIC | Age: 83
End: 2018-07-09

## 2018-07-09 VITALS
DIASTOLIC BLOOD PRESSURE: 65 MMHG | OXYGEN SATURATION: 97 % | TEMPERATURE: 97.2 F | HEART RATE: 71 BPM | SYSTOLIC BLOOD PRESSURE: 111 MMHG | WEIGHT: 170.7 LBS | BODY MASS INDEX: 23.12 KG/M2 | HEIGHT: 72 IN

## 2018-07-09 DIAGNOSIS — K86.89 PANCREATIC MASS: ICD-10-CM

## 2018-07-09 DIAGNOSIS — R93.89 ABNORMAL CT OF THE CHEST: ICD-10-CM

## 2018-07-09 DIAGNOSIS — I10 BENIGN ESSENTIAL HYPERTENSION: ICD-10-CM

## 2018-07-09 DIAGNOSIS — D37.9 NEOPLASM OF UNCERTAIN BEHAVIOR OF DIGESTIVE ORGAN: ICD-10-CM

## 2018-07-09 DIAGNOSIS — R06.09 OTHER FORM OF DYSPNEA: Primary | ICD-10-CM

## 2018-07-09 PROCEDURE — 99214 OFFICE O/P EST MOD 30 MIN: CPT | Performed by: FAMILY MEDICINE

## 2018-07-09 RX ORDER — DIGOXIN 250 UG/1
250 TABLET ORAL DAILY
Refills: 3 | COMMUNITY
Start: 2018-05-29

## 2018-07-09 RX ORDER — CETIRIZINE HYDROCHLORIDE 10 MG/1
1 TABLET ORAL DAILY
Refills: 5 | COMMUNITY
Start: 2018-05-24

## 2018-07-09 NOTE — PROGRESS NOTES
Subjective   Mingo Aquino is a 83 y.o. male.     Hypertension and Shortness of Breath (would like you to listen to his lungs from whenhe was at the ER 4/18/18 but not as bad)    History of Present Illness     Follow-up dyspnea.  In April this year he had severe bronchial irritation chest tightness.  Went to the emergency room.  Had a negative extensive workup.  However the CT scan did show some slightly enlarged hilar lymph nodes.  They recommended three-month follow-up.  He is also found to have pancreatic abnormalities.  He saw surgery.  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  Recommend no further investigation.  Nevus colonoscopy done which was normal.    He continues with a very vague and subtle dyspnea sensation, typically with inspiration on occasion.  Both daytime and nighttime.  No orthopnea.  No PND.  No dyspnea with exertion.  He is physically active.  He's not having trouble in the heat.  He is feeling much better psychologically.  He is mostly worried about the shortness of breath coming back, which is not.    Hypertension follow up. Doing well with current medication which he is taking as directed. No known high or low blood pressure episodes. No cardiovascular or neurological symptoms. Today's BP: 111/65.     Last lipid panel:   Lab Results   Component Value Date    HDL 42 02/08/2018     Lab Results   Component Value Date     (H) 02/08/2018     Lab Results   Component Value Date    TRIG 148 02/08/2018       The following portions of the patient's history were reviewed and updated as appropriate: allergies, current medications, past family history, past medical history, past social history, past surgical history and problem list.      Review of Systems   Constitutional: Negative.    Respiratory: Negative.  Negative for shortness of breath.    Cardiovascular: Negative.    Musculoskeletal: Negative.    Neurological: Negative.    Psychiatric/Behavioral: Negative.   "      Objective   Blood pressure 111/65, pulse 71, temperature 97.2 °F (36.2 °C), temperature source Oral, height 182.9 cm (72.01\"), weight 77.4 kg (170 lb 11.2 oz), SpO2 97 %.  Physical Exam   Constitutional: No distress.   No acute distress.  Nontoxic.   HENT:   Right Ear: Tympanic membrane, external ear and ear canal normal.   Left Ear: Tympanic membrane, external ear and ear canal normal.   Nose: Nose normal.   Mouth/Throat: Oropharynx is clear and moist. No oropharyngeal exudate.   Eyes: Conjunctivae are normal. Right eye exhibits no discharge. Left eye exhibits no discharge. No scleral icterus.   Cardiovascular: Normal rate.    Pulmonary/Chest: Effort normal and breath sounds normal. No stridor. No respiratory distress. He has no wheezes. He has no rales.   No tachypnea   Lymphadenopathy:     He has no cervical adenopathy.   Skin: No rash noted.   Nursing note and vitals reviewed.      Assessment/Plan   Mingo was seen today for hypertension and shortness of breath.    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  Likely functional.  He did have a abnormal CT scan of the chest in that there was slightly enlarged hilar lymph nodes.  They recommended a three-month follow-up.  I we are ordering that.  Otherwise observation is in order.  If symptoms worsen recommend further evaluation and/or pulmonary consultation.    Hypertension.  Stable.    Abnormal CT, see above.    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  Seen by surgery.  They recommend no further workup.  Patient understands to call with abdominal pain, weight changes, fevers, or other concerns.         "

## 2018-07-11 ENCOUNTER — HOSPITAL ENCOUNTER (OUTPATIENT)
Dept: CT IMAGING | Facility: HOSPITAL | Age: 83
Discharge: HOME | End: 2018-07-11
Admitting: FAMILY MEDICINE

## 2018-07-11 DIAGNOSIS — R06.09 OTHER FORM OF DYSPNEA: ICD-10-CM

## 2018-07-11 DIAGNOSIS — R93.89 ABNORMAL CT OF THE CHEST: ICD-10-CM

## 2018-07-11 LAB — CREAT BLDA-MCNC: 0.9 MG/DL (ref 0.6–1.3)

## 2018-07-11 PROCEDURE — 82565 ASSAY OF CREATININE: CPT

## 2018-07-11 PROCEDURE — 25010000002 IOPAMIDOL 61 % SOLUTION: Performed by: FAMILY MEDICINE

## 2018-07-11 PROCEDURE — 71260 CT THORAX DX C+: CPT

## 2018-07-11 RX ADMIN — IOPAMIDOL 75 ML: 612 INJECTION, SOLUTION INTRAVENOUS at 15:00

## 2018-07-13 NOTE — PROGRESS NOTES
The repeat CT can shows no change in the lymph nodes in the mediastinum and hilar area.  No further evaluation needed at this time.

## 2019-04-29 ENCOUNTER — OFFICE VISIT (OUTPATIENT)
Dept: FAMILY MEDICINE CLINIC | Facility: CLINIC | Age: 84
End: 2019-04-29

## 2019-04-29 VITALS
OXYGEN SATURATION: 97 % | HEIGHT: 72 IN | BODY MASS INDEX: 24.3 KG/M2 | DIASTOLIC BLOOD PRESSURE: 75 MMHG | HEART RATE: 53 BPM | TEMPERATURE: 97.1 F | WEIGHT: 179.4 LBS | SYSTOLIC BLOOD PRESSURE: 140 MMHG

## 2019-04-29 DIAGNOSIS — I10 BENIGN ESSENTIAL HYPERTENSION: Primary | ICD-10-CM

## 2019-04-29 PROCEDURE — 99213 OFFICE O/P EST LOW 20 MIN: CPT | Performed by: FAMILY MEDICINE

## 2019-04-29 NOTE — PROGRESS NOTES
"Subjective   Mingo Aquino is a 84 y.o. male.     Chief Complaint   Patient presents with   • Hypertension   • Skin Problem     sore spot above left earon the side off his face. he had a face lift and he had some tingling in there  over 18 yrs ago. and the last 2 yrs he just had some problems with soreness         History of Present Illness    Hypertension follow-up.  He continues on metoprolol tartrate 25 mg twice a day.  Is also on digoxin for his paroxysmal SVT.  It is prescribed by his cardiologist who he saw you recently.  I reviewed their notes.  They do not recommend any changes.    An area of skin irritation and soreness anterior to the left ear.  Is been going on for a number of months.  He states he had his dermatologist look at it in a few months ago and they did not recommend any intervention.  He had a facelift done a number of years ago, about 20 years ago.  He has had some numbness in that area since.  His father  of a brain tumor and the patient is worried about any type of tumor or cancer in that area.  He has no headaches.  No changes in vision.  No pain with chewing.  No ear pain.  He otherwise feels well.  No change in his activity.      The following portions of the patient's history were reviewed and updated as appropriate: allergies, current medications, past family history, past medical history, past social history, past surgical history and problem list.          Review of Systems   Constitutional: Negative.    Respiratory: Negative.    Cardiovascular: Negative.    Musculoskeletal: Negative.    Skin: Positive for rash.       Objective   Blood pressure 140/75, pulse 53, temperature 97.1 °F (36.2 °C), temperature source Oral, height 182.9 cm (72.01\"), weight 81.4 kg (179 lb 6.4 oz), SpO2 97 %.  Physical Exam   Constitutional: No distress.   No acute distress.  Nontoxic.   HENT:   Right Ear: Tympanic membrane, external ear and ear canal normal.   Left Ear: Tympanic membrane, external " ear and ear canal normal.   Nose: Nose normal.   Mouth/Throat: Oropharynx is clear and moist. No oropharyngeal exudate.   No TMJ discomfort to palpation.  No temporalis muscle discomfort to palpation.  There is no head or neck mass or adenopathy.  He has a slight area of skin hyperemia and irritation and scaling over the left zygomatic arch.  However no discrete ulceration or discrete macule or papule.   Eyes: Conjunctivae are normal. Right eye exhibits no discharge. Left eye exhibits no discharge. No scleral icterus.   Cardiovascular: Normal rate.   Pulmonary/Chest: Effort normal.   No tachypnea   Lymphadenopathy:     He has no cervical adenopathy.   Skin: No rash noted.   Nursing note and vitals reviewed.      Assessment/Plan   Mingo was seen today for hypertension and skin problem.    Diagnoses and all orders for this visit:    Benign essential hypertension        Hypertension.  Overall well controlled.  Continue metoprolol as is.  He also continues to follow with his cardiologist.    An area of skin irritation left face.  Possible early actinic keratosis.  I recommend he consider getting back to his dermatologist.  There is no evidence of head neck mass and no suspicious red flag findings.

## 2019-11-12 ENCOUNTER — OFFICE VISIT (OUTPATIENT)
Dept: FAMILY MEDICINE CLINIC | Facility: CLINIC | Age: 84
End: 2019-11-12

## 2019-11-12 VITALS
SYSTOLIC BLOOD PRESSURE: 129 MMHG | HEART RATE: 67 BPM | WEIGHT: 179 LBS | TEMPERATURE: 97.5 F | DIASTOLIC BLOOD PRESSURE: 68 MMHG | OXYGEN SATURATION: 98 % | BODY MASS INDEX: 24.24 KG/M2 | HEIGHT: 72 IN

## 2019-11-12 DIAGNOSIS — H81.11 BENIGN PAROXYSMAL POSITIONAL VERTIGO OF RIGHT EAR: Primary | ICD-10-CM

## 2019-11-12 PROCEDURE — 99213 OFFICE O/P EST LOW 20 MIN: CPT | Performed by: FAMILY MEDICINE

## 2019-11-12 NOTE — PROGRESS NOTES
"Subjective   Mingo Aquino is a 85 y.o. male.     Chief Complaint   Patient presents with   • Dizziness     x a few weeks today he was really bad    • URI        History of Present Illness    3 or 4 weeks of dizziness.  Worse when he changes position.  Lying down to sitting up.  Or when he turns one direction, particular to the right.  He often sleeps on his right side and notices the dizziness also.  He wears hearing aids.  He said no ear pain.  No change in hearing.  He does have wax in his ears reportedly.  He went to his ear nose and throat doctor a few months ago.  He has no troubles with coordination.  No troubles of weakness or numbness.  No headache.  He continues his metoprolol.  No known low blood pressures.  Minimal sinus symptoms.  Otherwise doing well.  Symptoms are moderate to severe at times but they do not last long.  May be a little bit worse over the last few weeks.      The following portions of the patient's history were reviewed and updated as appropriate: allergies, current medications, past family history, past medical history, past social history, past surgical history and problem list.          Review of Systems   Constitutional: Negative.    HENT: Negative for hearing loss and tinnitus.    Respiratory: Negative.    Cardiovascular: Negative.    Neurological: Positive for dizziness. Negative for headaches.       Objective   Blood pressure 129/68, pulse 67, temperature 97.5 °F (36.4 °C), temperature source Oral, height 182.9 cm (72.01\"), weight 81.2 kg (179 lb), SpO2 98 %.  Physical Exam   Constitutional: He is oriented to person, place, and time. No distress.   No acute distress.  Nontoxic.   HENT:   Right Ear: Tympanic membrane, external ear and ear canal normal.   Left Ear: Tympanic membrane, external ear and ear canal normal.   Nose: Nose normal.   Mouth/Throat: Oropharynx is clear and moist. No oropharyngeal exudate.   Cerumen right canal, partially blocking   Eyes: Conjunctivae are " normal. Right eye exhibits no discharge. Left eye exhibits no discharge. No scleral icterus.   Cardiovascular: Normal rate.   Pulmonary/Chest: Effort normal and breath sounds normal. No stridor. No respiratory distress. He has no wheezes. He has no rales.   No tachypnea   Lymphadenopathy:     He has no cervical adenopathy.   Neurological: He is alert and oriented to person, place, and time. He exhibits normal muscle tone. Coordination normal.   Neurological exam.  Pupils equal and reactive to light.  Extra ocular muscle movements intact all directions.  Face symmetric.  Gait unremarkable.  No ataxia.  No dysmetria.  Positive [place]-Hallpike right side, rotatory nystagmus.  About a 5 or 10-second latency.  Resolved with sitting up.     Skin: No rash noted.   Nursing note and vitals reviewed.      Assessment/Plan   Mingo was seen today for dizziness and uri.    Diagnoses and all orders for this visit:    Benign paroxysmal positional vertigo of right ear  -     Ambulatory Referral to Physical Therapy Evaluate and treat, Vestibular        Very probable benign paroxysmal positional vertigo.  No red flags.  Likely not central vertigo.  Recommending vestibular physical therapy.  Instructions given.  I will see him back as needed.  He understands go to the emergency room with severe persistent symptoms.

## 2019-12-04 ENCOUNTER — TREATMENT (OUTPATIENT)
Dept: PHYSICAL THERAPY | Facility: CLINIC | Age: 84
End: 2019-12-04

## 2019-12-04 DIAGNOSIS — H81.11 BPPV (BENIGN PAROXYSMAL POSITIONAL VERTIGO), RIGHT: Primary | ICD-10-CM

## 2019-12-04 DIAGNOSIS — R26.89 IMBALANCE: ICD-10-CM

## 2019-12-04 PROCEDURE — 97161 PT EVAL LOW COMPLEX 20 MIN: CPT | Performed by: PHYSICAL THERAPIST

## 2019-12-04 PROCEDURE — 95992 CANALITH REPOSITIONING PROC: CPT | Performed by: PHYSICAL THERAPIST

## 2019-12-04 NOTE — PROGRESS NOTES
Physical Therapy Initial Evaluation-  Vestibular Therapy    Patient Name: Mingo Aquino       Patient MRN: [id number]  : 1934  Physician:Adolfo Adame MD  Date: 2019  Encounter Diagnoses   Name Primary?   • BPPV (benign paroxysmal positional vertigo), right Yes   • Imbalance        Objective Testing:  Positional Testing  Positional Testing: With infrared goggles  Vertebrobasilar Artery Screen - Right: Negative  Vertebrobasilar Artery Screen - Left: Negative  [place]-Hallpike Right: Upbeat, right rotatory nystagmus  [place]-Hallpike Right Onset Time : 3 sec  [place]hea-Hallpike Right Duration Time : 12 sec  [place]-Hallpike Left: (NA)  Horizontal Roll Test Right: (NA)  Horizontal Roll Test Left: (NA)      DHI:        THERAPY ASSESSMENT: Patient is a 85 y.o. male. Patient presents to physical therapy due to complaints of episodes of imbalance with head movement in certain directions. Onset was 3 weeks ago and he is reporting improvement in symptoms since onset.  Signs and symptoms are consistent with R PC Canalithiasis BPPV. Testing of all other canals was deferred to treat R with CRP. Tolerated well. .  Patient is a good candidate for physical therapy to address the following:    Functional Limitations: Difficulty moving, Decreased ability to perform ADL's   Length of Therapy: 1 month   PT Frequency: PT 1x week   PT Interventions: Home Exercise Program, CRP  Patient Agrees with Plan of Care: Yes    REHAB POTENTIAL: excellent            SHORT TERM GOALS: To be met in 2 weeks:  1. Patient is independent with HEP.  2. Patient will report at least 30% improvement in overall condition.  LONG TERM GOALS:To be met in 4 weeks:  1. Patient will report decreased disequilibrium/dizziness by at least 90%.  2. Patient will report no loss of balance with ADLs to demonstrate improved functional balance.  3. Patient denies dizziness with daily activity.  4. DHI score is less than 10.     Other Procedure CPT 56723 minutes  0    Timed Code Treatment: 0   Minutes     Total Treatment Time: 30      Minutes      PT SIGNATURE: Alejandra Estevez, PT, DPT, CHT   [place] license #[id number]  DATE TREATMENT INITIATED: 12/4/2019     Medicare Initial Certification  Certification Period: 3/3/2020  I certify that the therapy services are furnished while this patient is under my care.  The services outlined above are required by this patient, and will be reviewed every 90 days.     PHYSICIAN: Adolfo Adame MD      DATE:     Please sign and return via fax to [phone].. Thank you, [facility].

## 2021-01-01 NOTE — PATIENT INSTRUCTIONS
Benign Positional Vertigo  Vertigo is the feeling that you or your surroundings are moving when they are not. Benign positional vertigo is the most common form of vertigo. This is usually a harmless condition (benign). This condition is positional. This means that symptoms are triggered by certain movements and positions.  This condition can be dangerous if it occurs while you are doing something that could cause harm to you or others. This includes activities such as driving or operating machinery.  What are the causes?  In many cases, the cause of this condition is not known. It may be caused by a disturbance in an area of the inner ear that helps your brain to sense movement and balance. This disturbance can be caused by:  · Viral infection (labyrinthitis).  · Head injury.  · Repetitive motion, such as jumping, dancing, or running.  What increases the risk?  You are more likely to develop this condition if:  · You are a woman.  · You are 50 years of age or older.  What are the signs or symptoms?  Symptoms of this condition usually happen when you move your head or your eyes in different directions. Symptoms may start suddenly, and usually last for less than a minute. They include:  · Loss of balance and falling.  · Feeling like you are spinning or moving.  · Feeling like your surroundings are spinning or moving.  · Nausea and vomiting.  · Blurred vision.  · Dizziness.  · Involuntary eye movement (nystagmus).  Symptoms can be mild and cause only minor problems, or they can be severe and interfere with daily life. Episodes of benign positional vertigo may return (recur) over time. Symptoms may improve over time.  How is this diagnosed?  This condition may be diagnosed based on:  · Your medical history.  · Physical exam of the head, neck, and ears.  · Tests, such as:  ? MRI.  ? CT scan.  ? Eye movement tests. Your health care provider may ask you to change positions quickly while he or she watches you for symptoms  SUBJECTIVE:     Rui Bates is a 2 month old male, here for a routine health maintenance visit.    Patient was roomed by: Zaira Adams    Well Child    Social History  Forms to complete? No  Child lives with::  Mother, father, brother and aunt  Who takes care of your child?:  Home with family member  Languages spoken in the home:  English and [place]i  Recent family changes/ special stressors?:  Job change    Safety / Health Risk  Is your child around anyone who smokes?  No    TB Exposure:     No TB exposure    Car seat < 6 years old, in  back seat, rear-facing, 5-point restraint? Yes    Home Safety Survey:      Firearms in the home?: No      Hearing / Vision  Hearing or vision concerns?  No concerns, hearing and vision subjectively normal    Daily Activities    Water source:  Filtered water  Nutrition:  Breastmilk and pumped breastmilk by bottle  Breastfeeding concerns?  None, breastfeeding going well; no concerns  Vitamins & Supplements:  No    Elimination       Urinary frequency:more than 6 times per 24 hours     Stool frequency: more than 6 times per 24 hours     Stool consistency: soft     Elimination problems:  None    Sleep      Sleep arrangement:bassinet and CO-SLEEP WITH PARENT    Sleep position:  On back and on side    Sleep pattern: wakes at night for feedings and other      [place]  Depression Scale (EPDS) Risk Assessment: Completed [place]    BIRTH HISTORY  [place] metabolic screening: All components normal    DEVELOPMENT    Milestones (by observation/ exam/ report) 75-90% ile  PERSONAL/ SOCIAL/COGNITIVE:    Regards face    Smiles responsively  LANGUAGE:    Vocalizes    Responds to sound  GROSS MOTOR:    Lift head when prone    Kicks / equal movements  FINE MOTOR/ ADAPTIVE:    Eyes follow past midline    Reflexive grasp    PROBLEM LIST  Patient Active Problem List   Diagnosis     Single liveborn infant, delivered by      MEDICATIONS  No current outpatient medications on file.       ALLERGY  No Known Allergies    IMMUNIZATIONS  Immunization History   Administered Date(s) Administered     Hep B, Peds or Adolescent 2021       HEALTH HISTORY SINCE LAST VISIT  No surgery, major illness or injury since last physical exam    ROS  Constitutional, eye, ENT, skin, respiratory, cardiac, and GI are normal except as otherwise noted.    OBJECTIVE:   EXAM  There were no vitals taken for this visit.  No head circumference on file for this encounter.  No weight on file for this encounter.  No height on file for this encounter.  No height and weight on file for this encounter.  GENERAL: Active, alert, in no acute distress.  SKIN: Clear. No significant rash, abnormal pigmentation or lesions  HEAD: Normocephalic. Normal fontanels and sutures.  EYES: Conjunctivae and cornea normal. Red reflexes present bilaterally.  EARS: Normal canals. Tympanic membranes are normal; gray and translucent.  NOSE: Normal without discharge.  MOUTH/THROAT: Clear. No oral lesions.  NECK: Supple, no masses.  LYMPH NODES: No adenopathy  LUNGS: Clear. No rales, rhonchi, wheezing or retractions  HEART: Regular rhythm. Normal S1/S2. No murmurs. Normal femoral pulses.  ABDOMEN: Soft, non-tender, not distended, no masses or hepatosplenomegaly. Normal umbilicus and bowel sounds.   GENITALIA: Normal male external genitalia. Joe stage I,  Testes descended bilaterally, no hernia or hydrocele.    EXTREMITIES: Hips normal with negative Ortolani and Liu. Symmetric creases and  no deformities  NEUROLOGIC: Normal tone throughout. Normal reflexes for age    ASSESSMENT/PLAN:       ICD-10-CM    1. Encounter for routine child health examination w/o abnormal findings  Z00.129 MATERNAL HEALTH RISK ASSESSMENT (60605)- EPDS     DTAP - HIB - IPV VACCINE, IM USE (Pentacel) [[id number]]     HEPATITIS B VACCINE,PED/ADOL,IM [[id number]]     PNEUMOCOCCAL CONJ VACCINE 13 VALENT IM [[id number]]     ROTAVIRUS, 3 DOSE, PO (6WKS - 8 MO AND 0 DAYS) - RotaTeq  of benign positional vertigo, such as nystagmus. Eye movement may be tested with a variety of exams that are designed to evaluate or stimulate vertigo.  ? An electroencephalogram (EEG). This records electrical activity in your brain.  ? Hearing tests.  You may be referred to a health care provider who specializes in ear, nose, and throat (ENT) problems (otolaryngologist) or a provider who specializes in disorders of the nervous system (neurologist).  How is this treated?    This condition may be treated in a session in which your health care provider moves your head in specific positions to adjust your inner ear back to normal. Treatment for this condition may take several sessions. Surgery may be needed in severe cases, but this is rare.   In some cases, benign positional vertigo may resolve on its own in 2-4 weeks.  Follow these instructions at home:  Safety  · Move slowly. Avoid sudden body or head movements or certain positions, as told by your health care provider.  · Avoid driving until your health care provider says it is safe for you to do so.  · Avoid operating heavy machinery until your health care provider says it is safe for you to do so.  · Avoid doing any tasks that would be dangerous to you or others if vertigo occurs.  · If you have trouble walking or keeping your balance, try using a cane for stability. If you feel dizzy or unstable, sit down right away.  · Return to your normal activities as told by your health care provider. Ask your health care provider what activities are safe for you.  General instructions  · Take over-the-counter and prescription medicines only as told by your health care provider.  · Drink enough fluid to keep your urine pale yellow.  · Keep all follow-up visits as told by your health care provider. This is important.  Contact a health care provider if:  · You have a fever.  · Your condition gets worse or you develop new symptoms.  · Your family or friends notice any  "behavioral changes.  · You have nausea or vomiting that gets worse.  · You have numbness or a \"pins and needles\" sensation.  Get help right away if you:  · Have difficulty speaking or moving.  · Are always dizzy.  · Faint.  · Develop severe headaches.  · Have weakness in your legs or arms.  · Have changes in your hearing or vision.  · Develop a stiff neck.  · Develop sensitivity to light.  Summary  · Vertigo is the feeling that you or your surroundings are moving when they are not. Benign positional vertigo is the most common form of vertigo.  · The cause of this condition is not known. It may be caused by a disturbance in an area of the inner ear that helps your brain to sense movement and balance.  · Symptoms include loss of balance and falling, feeling that you or your surroundings are moving, nausea and vomiting, and blurred vision.  · This condition can be diagnosed based on symptoms, physical exam, and other tests, such as MRI, CT scan, eye movement tests, and hearing tests.  · Follow safety instructions as told by your health care provider. You will also be told when to contact your health care provider in case of problems.  This information is not intended to replace advice given to you by your health care provider. Make sure you discuss any questions you have with your health care provider.  Document Released: 09/25/2007 Document Revised: 05/29/2019 Document Reviewed: 05/29/2019  [employer] Interactive Patient Education © 2019 [employer]    " ([id number])     Advised to check at next CF follow up to make sure they do not feel pt needs further testing due to siblings dx of CF but pt normal  screen.  Reviewed should be fine unless to rare mutations. Mom states brother with one common mutation for sure  Anticipatory Guidance  The following topics were discussed:  SOCIAL/ FAMILY    return to work    sibling rivalry    crying/ fussiness    calming techniques  NUTRITION:    pumping/ introducing bottle    always hold to feed/ never prop bottle  HEALTH/ SAFETY:    skin care    spitting up    sleep patterns    car seat    falls    safe crib    Preventive Care Plan  Immunizations     I provided face to face vaccine counseling, answered questions, and explained the benefits and risks of the vaccine components ordered today including:  [id number] (Pentacel ), Pneumococcal 13-valent Conjugate (Prevnar ) and Rotavirus  Referrals/Ongoing Specialty care: No   [facility]    Resources:  [place] Child and Teen Checkups (C&TC) Schedule of Age-Related Screening Standards    FOLLOW-UP:    4 month Preventive Care visit    Angel Polk MD  [facility]

## 2021-02-01 ENCOUNTER — TELEPHONE (OUTPATIENT)
Dept: URGENT CARE | Facility: CLINIC | Age: 86
End: 2021-02-01

## 2021-02-02 ENCOUNTER — OFFICE VISIT (OUTPATIENT)
Dept: FAMILY MEDICINE CLINIC | Facility: CLINIC | Age: 86
End: 2021-02-02

## 2021-02-02 VITALS
HEIGHT: 72 IN | HEART RATE: 67 BPM | OXYGEN SATURATION: 99 % | SYSTOLIC BLOOD PRESSURE: 150 MMHG | BODY MASS INDEX: 24.5 KG/M2 | WEIGHT: 180.9 LBS | DIASTOLIC BLOOD PRESSURE: 86 MMHG | TEMPERATURE: 97.5 F

## 2021-02-02 DIAGNOSIS — H53.2 DIPLOPIA: ICD-10-CM

## 2021-02-02 DIAGNOSIS — R27.8 COORDINATION ABNORMAL: ICD-10-CM

## 2021-02-02 DIAGNOSIS — R42 VERTIGO: Primary | ICD-10-CM

## 2021-02-02 PROCEDURE — 99214 OFFICE O/P EST MOD 30 MIN: CPT | Performed by: FAMILY MEDICINE

## 2021-02-02 NOTE — PROGRESS NOTES
"Chief Complaint  Dizziness (x 1/29/21)    Subjective          Mingo Aquino presents to [facility] for   History of Present Illness    5 days of dizziness.  Ambiguous.  When he stands up he feels wobbly.  Things are not spinning.  He said no ear pain or ear pressure.  He is had some increased sinus drainage.  He went to the urgent care over the weekend.  He had a negative COVID-19 test.  He has no URI symptoms.  No change in sense of taste or smell.  No fever.  No shortness of breath.  No headache.    He had benign paroxysmal positional vertigo to the right ear about a year and a half ago.  Similar symptoms?  However at this time is a little bit different because he is complaining of some troubles with coordination of his left arm and left hand from time to time.  That started recently also.  He is also concerned because he has a family history of 2 relatives, first-degree, with previous brain tumors.  He has had no headaches.  He has had some diplopia recently watching television.  He has had some trouble with ambulation related to the dizziness as above.    He saw his cardiologist in recent months.  He has a history of palpitations and hypertension.  He continues on digoxin prescribed by them.  His creatinine was normal recently.  He has not had any visual changes such as yellow vision.  Just of the diplopia briefly as above.    Objective   Vital Signs:   /86   Pulse 67   Temp 97.5 °F (36.4 °C) (Temporal)   Ht 182.9 cm (72.01\")   Wt 82.1 kg (180 lb 14.4 oz)   SpO2 99%   BMI 24.53 kg/m²     Physical Exam  Constitutional:       Appearance: Normal appearance.   HENT:      Right Ear: Tympanic membrane and ear canal normal.      Left Ear: Tympanic membrane and ear canal normal.      Ears:      Comments: Some scar tissue right ear.     Nose: Nose normal.      Mouth/Throat:      Mouth: Mucous membranes are moist.      Pharynx: Oropharynx is clear. No oropharyngeal exudate " or posterior oropharyngeal erythema.   Eyes:      Extraocular Movements: Extraocular movements intact.      Conjunctiva/sclera: Conjunctivae normal.      Pupils: Pupils are equal, round, and reactive to light.   Neck:      Musculoskeletal: Normal range of motion and neck supple. No neck rigidity.   Cardiovascular:      Rate and Rhythm: Normal rate and regular rhythm.      Pulses: Normal pulses.      Heart sounds: Normal heart sounds.   Pulmonary:      Effort: Pulmonary effort is normal.      Breath sounds: Normal breath sounds.   Musculoskeletal: Normal range of motion.   Skin:     General: Skin is warm and dry.   Neurological:      Mental Status: He is alert and oriented to person, place, and time.      Comments: Pupils are equal and reactive to light.  Extraocular muscle intact all directions.  Neck supple full range of motion.  Tongue and face midline.  No facial drooping.  He has a positive Rhea-Hallpike to the right side.  With rotatory nystagmus to the right side.  Epley maneuver performed starting to the right.  No change in symptoms after the Epley maneuver.  There is no dysmetria.  There is no ataxia.  There is no tremor.  There is no bradykinesia.  No rigidity.       Blood pressure sitting is 150/60.  Standing is 130/60.    Result Review :                 Assessment and Plan    Problem List Items Addressed This Visit     None      Visit Diagnoses     Vertigo    -  Primary    Relevant Orders    MRI Brain Without Contrast    Ambulatory Referral to Physical Therapy Vestibular    Diplopia        Coordination abnormal            Vertigo, brief diplopia, intermittent coordination issues left hand and arm.  The vertigo and the diplopia certainly could be related to benign paroxysmal positional vertigo.  However the left arm symptoms, not noted today on exam, are more concerning.  I recommend MRI of the brain.  He does have her strong family history of brain malignancy in first-degree relatives.  Although otherwise  his exam is unremarkable.    Benign paroxysmal positional vertigo.  Right-sided.  Epley maneuver performed today.  Referral to physical therapy placed.  At this time no need for meclizine.  Stop amoxicillin, there is no evidence of sinusitis currently.    Hypertension.  Orthostatic blood pressure changes noted today, but asymptomatic.    Follow-up as scheduled in 3 months.  Sooner as needed.      Follow Up   No follow-ups on file.  Patient was given instructions and counseling regarding his condition or for health maintenance advice. Please see specific information pulled into the AVS if appropriate.

## 2021-02-08 ENCOUNTER — TELEPHONE (OUTPATIENT)
Dept: FAMILY MEDICINE CLINIC | Facility: CLINIC | Age: 86
End: 2021-02-08

## 2021-02-08 NOTE — TELEPHONE ENCOUNTER
Caller: Mingo Aquino    Relationship: Self    Best call back number: [phone]    Additional notes: PATIENT CALLED STATING THAT DR WAS GOING TO MAKE AN APPT FOR PATIENT FOR AN MRI. PATIENT HAS NOT HEARD VERIFICATION OF APPT YET. PLEASE CALL PATIENT WITH APPOINTMENT INFORMATION.

## 2021-02-23 ENCOUNTER — HOSPITAL ENCOUNTER (OUTPATIENT)
Dept: MRI IMAGING | Facility: HOSPITAL | Age: 86
Discharge: HOME | End: 2021-02-23
Admitting: FAMILY MEDICINE

## 2021-02-23 DIAGNOSIS — R42 VERTIGO: ICD-10-CM

## 2021-02-23 PROCEDURE — 70551 MRI BRAIN STEM W/O DYE: CPT

## 2021-02-24 ENCOUNTER — DOCUMENTATION (OUTPATIENT)
Dept: FAMILY MEDICINE CLINIC | Facility: CLINIC | Age: 86
End: 2021-02-24

## 2021-02-24 ENCOUNTER — APPOINTMENT (OUTPATIENT)
Dept: CT IMAGING | Facility: HOSPITAL | Age: 86
End: 2021-02-24

## 2021-02-24 ENCOUNTER — HOSPITAL ENCOUNTER (OUTPATIENT)
Facility: HOSPITAL | Age: 86
Setting detail: OBSERVATION
Discharge: HOME | End: 2021-02-25
Attending: HOSPITALIST | Admitting: HOSPITALIST

## 2021-02-24 LAB
ALBUMIN SERPL-MCNC: 3.8 G/DL (ref 3.5–5.2)
ALBUMIN/GLOB SERPL: 1.8 G/DL
ALP SERPL-CCNC: 52 U/L (ref 39–117)
ALT SERPL W P-5'-P-CCNC: 17 U/L (ref 1–41)
ANION GAP SERPL CALCULATED.3IONS-SCNC: 8.7 MMOL/L (ref 5–15)
AST SERPL-CCNC: 17 U/L (ref 1–40)
BASOPHILS # BLD AUTO: 0.14 10*3/MM3 (ref 0–0.2)
BASOPHILS NFR BLD AUTO: 1.6 % (ref 0–1.5)
BILIRUB SERPL-MCNC: 0.5 MG/DL (ref 0–1.2)
BUN SERPL-MCNC: 14 MG/DL (ref 8–23)
BUN/CREAT SERPL: 15.9 (ref 7–25)
CALCIUM SPEC-SCNC: 9.5 MG/DL (ref 8.6–10.5)
CHLORIDE SERPL-SCNC: 105 MMOL/L (ref 98–107)
CO2 SERPL-SCNC: 25.3 MMOL/L (ref 22–29)
CREAT SERPL-MCNC: 0.88 MG/DL (ref 0.76–1.27)
DEPRECATED RDW RBC AUTO: 40.5 FL (ref 37–54)
DIGOXIN SERPL-MCNC: 0.6 NG/ML (ref 0.6–1.2)
EOSINOPHIL # BLD AUTO: 0.38 10*3/MM3 (ref 0–0.4)
EOSINOPHIL NFR BLD AUTO: 4.4 % (ref 0.3–6.2)
ERYTHROCYTE [DISTWIDTH] IN BLOOD BY AUTOMATED COUNT: 12.4 % (ref 12.3–15.4)
GFR SERPL CREATININE-BSD FRML MDRD: 82 ML/MIN/1.73
GLOBULIN UR ELPH-MCNC: 2.1 GM/DL
GLUCOSE BLDC GLUCOMTR-MCNC: 104 MG/DL (ref 70–130)
GLUCOSE BLDC GLUCOMTR-MCNC: 91 MG/DL (ref 70–130)
GLUCOSE SERPL-MCNC: 116 MG/DL (ref 65–99)
HCT VFR BLD AUTO: 44.9 % (ref 37.5–51)
HGB BLD-MCNC: 15.4 G/DL (ref 13–17.7)
IMM GRANULOCYTES # BLD AUTO: 0.12 10*3/MM3 (ref 0–0.05)
IMM GRANULOCYTES NFR BLD AUTO: 1.4 % (ref 0–0.5)
INR PPP: 1.01 (ref 0.9–1.1)
LYMPHOCYTES # BLD AUTO: 2.56 10*3/MM3 (ref 0.7–3.1)
LYMPHOCYTES NFR BLD AUTO: 29.5 % (ref 19.6–45.3)
MCH RBC QN AUTO: 30.9 PG (ref 26.6–33)
MCHC RBC AUTO-ENTMCNC: 34.3 G/DL (ref 31.5–35.7)
MCV RBC AUTO: 90 FL (ref 79–97)
MONOCYTES # BLD AUTO: 1.02 10*3/MM3 (ref 0.1–0.9)
MONOCYTES NFR BLD AUTO: 11.8 % (ref 5–12)
NEUTROPHILS NFR BLD AUTO: 4.46 10*3/MM3 (ref 1.7–7)
NEUTROPHILS NFR BLD AUTO: 51.3 % (ref 42.7–76)
NRBC BLD AUTO-RTO: 0 /100 WBC (ref 0–0.2)
PLATELET # BLD AUTO: 248 10*3/MM3 (ref 140–450)
PMV BLD AUTO: 9.7 FL (ref 6–12)
POTASSIUM SERPL-SCNC: 3.9 MMOL/L (ref 3.5–5.2)
PROT SERPL-MCNC: 5.9 G/DL (ref 6–8.5)
PROTHROMBIN TIME: 13.1 SECONDS (ref 11.7–14.2)
QT INTERVAL: 370 MS
RBC # BLD AUTO: 4.99 10*6/MM3 (ref 4.14–5.8)
SARS-COV-2 RNA RESP QL NAA+PROBE: NOT DETECTED
SODIUM SERPL-SCNC: 139 MMOL/L (ref 136–145)
TROPONIN T SERPL-MCNC: <0.01 NG/ML (ref 0–0.03)
WBC # BLD AUTO: 8.68 10*3/MM3 (ref 3.4–10.8)

## 2021-02-24 PROCEDURE — C9803 HOPD COVID-19 SPEC COLLECT: HCPCS

## 2021-02-24 PROCEDURE — 85610 PROTHROMBIN TIME: CPT | Performed by: NURSE PRACTITIONER

## 2021-02-24 PROCEDURE — G0378 HOSPITAL OBSERVATION PER HR: HCPCS

## 2021-02-24 PROCEDURE — 93005 ELECTROCARDIOGRAM TRACING: CPT | Performed by: NURSE PRACTITIONER

## 2021-02-24 PROCEDURE — 96361 HYDRATE IV INFUSION ADD-ON: CPT

## 2021-02-24 PROCEDURE — 93010 ELECTROCARDIOGRAM REPORT: CPT | Performed by: INTERNAL MEDICINE

## 2021-02-24 PROCEDURE — 85025 COMPLETE CBC W/AUTO DIFF WBC: CPT | Performed by: NURSE PRACTITIONER

## 2021-02-24 PROCEDURE — 0 IOPAMIDOL PER 1 ML: Performed by: HOSPITALIST

## 2021-02-24 PROCEDURE — 82962 GLUCOSE BLOOD TEST: CPT

## 2021-02-24 PROCEDURE — 99204 OFFICE O/P NEW MOD 45 MIN: CPT | Performed by: PSYCHIATRY & NEUROLOGY

## 2021-02-24 PROCEDURE — U0003 INFECTIOUS AGENT DETECTION BY NUCLEIC ACID (DNA OR RNA); SEVERE ACUTE RESPIRATORY SYNDROME CORONAVIRUS 2 (SARS-COV-2) (CORONAVIRUS DISEASE [COVID-19]), AMPLIFIED PROBE TECHNIQUE, MAKING USE OF HIGH THROUGHPUT TECHNOLOGIES AS DESCRIBED BY CMS-2020-01-R: HCPCS | Performed by: HOSPITALIST

## 2021-02-24 PROCEDURE — 96360 HYDRATION IV INFUSION INIT: CPT

## 2021-02-24 PROCEDURE — 70498 CT ANGIOGRAPHY NECK: CPT

## 2021-02-24 PROCEDURE — 80053 COMPREHEN METABOLIC PANEL: CPT | Performed by: NURSE PRACTITIONER

## 2021-02-24 PROCEDURE — 80162 ASSAY OF DIGOXIN TOTAL: CPT | Performed by: NURSE PRACTITIONER

## 2021-02-24 PROCEDURE — 70496 CT ANGIOGRAPHY HEAD: CPT

## 2021-02-24 PROCEDURE — 84484 ASSAY OF TROPONIN QUANT: CPT | Performed by: NURSE PRACTITIONER

## 2021-02-24 RX ORDER — CLOPIDOGREL BISULFATE 75 MG/1
75 TABLET ORAL DAILY
Status: DISCONTINUED | OUTPATIENT
Start: 2021-02-25 | End: 2021-02-25 | Stop reason: HOSPADM

## 2021-02-24 RX ORDER — CLOPIDOGREL BISULFATE 75 MG/1
300 TABLET ORAL ONCE
Status: COMPLETED | OUTPATIENT
Start: 2021-02-24 | End: 2021-02-24

## 2021-02-24 RX ORDER — ACETAMINOPHEN 325 MG/1
650 TABLET ORAL EVERY 4 HOURS PRN
Status: DISCONTINUED | OUTPATIENT
Start: 2021-02-24 | End: 2021-02-25 | Stop reason: HOSPADM

## 2021-02-24 RX ORDER — CETIRIZINE HYDROCHLORIDE 10 MG/1
10 TABLET ORAL DAILY
Status: DISCONTINUED | OUTPATIENT
Start: 2021-02-24 | End: 2021-02-25 | Stop reason: HOSPADM

## 2021-02-24 RX ORDER — ACETAMINOPHEN 650 MG/1
650 SUPPOSITORY RECTAL EVERY 4 HOURS PRN
Status: DISCONTINUED | OUTPATIENT
Start: 2021-02-24 | End: 2021-02-25 | Stop reason: HOSPADM

## 2021-02-24 RX ORDER — ASPIRIN 325 MG
325 TABLET ORAL DAILY
Status: DISCONTINUED | OUTPATIENT
Start: 2021-02-24 | End: 2021-02-25

## 2021-02-24 RX ORDER — ATORVASTATIN CALCIUM 80 MG/1
80 TABLET, FILM COATED ORAL NIGHTLY
Status: DISCONTINUED | OUTPATIENT
Start: 2021-02-24 | End: 2021-02-25 | Stop reason: HOSPADM

## 2021-02-24 RX ORDER — SODIUM CHLORIDE 9 MG/ML
100 INJECTION, SOLUTION INTRAVENOUS CONTINUOUS
Status: DISCONTINUED | OUTPATIENT
Start: 2021-02-24 | End: 2021-02-25 | Stop reason: HOSPADM

## 2021-02-24 RX ORDER — SODIUM CHLORIDE 0.9 % (FLUSH) 0.9 %
10 SYRINGE (ML) INJECTION EVERY 12 HOURS SCHEDULED
Status: DISCONTINUED | OUTPATIENT
Start: 2021-02-24 | End: 2021-02-25 | Stop reason: HOSPADM

## 2021-02-24 RX ORDER — ASPIRIN 300 MG/1
300 SUPPOSITORY RECTAL DAILY
Status: DISCONTINUED | OUTPATIENT
Start: 2021-02-24 | End: 2021-02-25

## 2021-02-24 RX ORDER — DIGOXIN 250 MCG
250 TABLET ORAL DAILY
Status: DISCONTINUED | OUTPATIENT
Start: 2021-02-25 | End: 2021-02-25 | Stop reason: HOSPADM

## 2021-02-24 RX ORDER — SODIUM CHLORIDE 0.9 % (FLUSH) 0.9 %
10 SYRINGE (ML) INJECTION AS NEEDED
Status: DISCONTINUED | OUTPATIENT
Start: 2021-02-24 | End: 2021-02-25 | Stop reason: HOSPADM

## 2021-02-24 RX ORDER — ONDANSETRON 2 MG/ML
4 INJECTION INTRAMUSCULAR; INTRAVENOUS EVERY 6 HOURS PRN
Status: DISCONTINUED | OUTPATIENT
Start: 2021-02-24 | End: 2021-02-25 | Stop reason: HOSPADM

## 2021-02-24 RX ADMIN — ASPIRIN 325 MG: 325 TABLET ORAL at 15:52

## 2021-02-24 RX ADMIN — CLOPIDOGREL 300 MG: 75 TABLET, FILM COATED ORAL at 15:53

## 2021-02-24 RX ADMIN — SODIUM CHLORIDE, PRESERVATIVE FREE 10 ML: 5 INJECTION INTRAVENOUS at 21:00

## 2021-02-24 RX ADMIN — METOPROLOL TARTRATE 12.5 MG: 25 TABLET, FILM COATED ORAL at 20:58

## 2021-02-24 RX ADMIN — CETIRIZINE HYDROCHLORIDE ALLERGY 10 MG: 10 TABLET ORAL at 17:16

## 2021-02-24 RX ADMIN — SODIUM CHLORIDE, PRESERVATIVE FREE 10 ML: 5 INJECTION INTRAVENOUS at 13:22

## 2021-02-24 RX ADMIN — ATORVASTATIN CALCIUM 80 MG: 80 TABLET, FILM COATED ORAL at 21:00

## 2021-02-24 RX ADMIN — IOPAMIDOL 100 ML: 755 INJECTION, SOLUTION INTRAVENOUS at 13:46

## 2021-02-24 RX ADMIN — SODIUM CHLORIDE 100 ML/HR: 9 INJECTION, SOLUTION INTRAVENOUS at 13:22

## 2021-02-24 NOTE — PROGRESS NOTES
Patient had MRI done yesterday because of dizziness, diplopia, trouble coordination.  I received a phone call from the radiologist today.  Patient has had some subacute infarcts of the lashawn.  There is concern about a impending basilar artery occlusion.  The radiologist recommended ASAP neurological work-up.  I called the patient.  His symptoms are now much better.  He is no longer having the vertigo or other symptoms.  I am directly admitting him to [facility].  At this time no need for the emergency room.  He has a friend is going to drive him.  I spoke to the hospitalist physician was going to admit him.  No need a ASAP CT angiography today.  Will also need neuro consultation.

## 2021-02-25 ENCOUNTER — READMISSION MANAGEMENT (OUTPATIENT)
Dept: CALL CENTER | Facility: HOSPITAL | Age: 86
End: 2021-02-25

## 2021-02-25 ENCOUNTER — APPOINTMENT (OUTPATIENT)
Dept: CARDIOLOGY | Facility: HOSPITAL | Age: 86
End: 2021-02-25

## 2021-02-25 VITALS
RESPIRATION RATE: 16 BRPM | DIASTOLIC BLOOD PRESSURE: 68 MMHG | BODY MASS INDEX: 24.38 KG/M2 | TEMPERATURE: 97.5 F | OXYGEN SATURATION: 96 % | SYSTOLIC BLOOD PRESSURE: 132 MMHG | HEART RATE: 55 BPM | WEIGHT: 180 LBS | HEIGHT: 72 IN

## 2021-02-25 LAB
ALBUMIN SERPL-MCNC: 3.7 G/DL (ref 3.5–5.2)
ALBUMIN/GLOB SERPL: 1.9 G/DL
ALP SERPL-CCNC: 45 U/L (ref 39–117)
ALT SERPL W P-5'-P-CCNC: 19 U/L (ref 1–41)
ANION GAP SERPL CALCULATED.3IONS-SCNC: 7.1 MMOL/L (ref 5–15)
AORTIC ARCH: 2 CM
ASCENDING AORTA: 3.3 CM
AST SERPL-CCNC: 13 U/L (ref 1–40)
AV VENA CONTRACTA: 0.31 CM
BH CV ECHO MEAS - AI DEC SLOPE: 182 CM/SEC^2
BH CV ECHO MEAS - AI MAX PG: 73.6 MMHG
BH CV ECHO MEAS - AI MAX VEL: 429 CM/SEC
BH CV ECHO MEAS - AI P1/2T: 690.4 MSEC
BH CV ECHO MEAS - AO MAX PG (FULL): 4.2 MMHG
BH CV ECHO MEAS - AO MAX PG: 7.8 MMHG
BH CV ECHO MEAS - AO MEAN PG (FULL): 2 MMHG
BH CV ECHO MEAS - AO MEAN PG: 4 MMHG
BH CV ECHO MEAS - AO V2 MAX: 140 CM/SEC
BH CV ECHO MEAS - AO V2 MEAN: 95.2 CM/SEC
BH CV ECHO MEAS - AO V2 VTI: 29.3 CM
BH CV ECHO MEAS - ASC AORTA: 3.3 CM
BH CV ECHO MEAS - AVA(I,A): 2.4 CM^2
BH CV ECHO MEAS - AVA(I,D): 2.4 CM^2
BH CV ECHO MEAS - AVA(V,A): 2.4 CM^2
BH CV ECHO MEAS - AVA(V,D): 2.4 CM^2
BH CV ECHO MEAS - BSA(HAYCOCK): 2 M^2
BH CV ECHO MEAS - BSA: 2 M^2
BH CV ECHO MEAS - BZI_BMI: 24.1 KILOGRAMS/M^2
BH CV ECHO MEAS - BZI_METRIC_HEIGHT: 182.9 CM
BH CV ECHO MEAS - BZI_METRIC_WEIGHT: 80.7 KG
BH CV ECHO MEAS - EDV(CUBED): 85.2 ML
BH CV ECHO MEAS - EDV(MOD-SP2): 78 ML
BH CV ECHO MEAS - EDV(MOD-SP4): 66 ML
BH CV ECHO MEAS - EDV(TEICH): 87.7 ML
BH CV ECHO MEAS - EF(CUBED): 45.2 %
BH CV ECHO MEAS - EF(MOD-BP): 65.6 %
BH CV ECHO MEAS - EF(MOD-SP2): 61.5 %
BH CV ECHO MEAS - EF(MOD-SP4): 68.2 %
BH CV ECHO MEAS - EF(TEICH): 37.9 %
BH CV ECHO MEAS - ESV(CUBED): 46.7 ML
BH CV ECHO MEAS - ESV(MOD-SP2): 30 ML
BH CV ECHO MEAS - ESV(MOD-SP4): 21 ML
BH CV ECHO MEAS - ESV(TEICH): 54.4 ML
BH CV ECHO MEAS - FS: 18.2 %
BH CV ECHO MEAS - IVS/LVPW: 1
BH CV ECHO MEAS - IVSD: 1.1 CM
BH CV ECHO MEAS - LAT PEAK E' VEL: 7.6 CM/SEC
BH CV ECHO MEAS - LV DIASTOLIC VOL/BSA (35-75): 32.6 ML/M^2
BH CV ECHO MEAS - LV MASS(C)D: 168.9 GRAMS
BH CV ECHO MEAS - LV MASS(C)DI: 83.3 GRAMS/M^2
BH CV ECHO MEAS - LV MAX PG: 3.6 MMHG
BH CV ECHO MEAS - LV MEAN PG: 2 MMHG
BH CV ECHO MEAS - LV SYSTOLIC VOL/BSA (12-30): 10.4 ML/M^2
BH CV ECHO MEAS - LV V1 MAX: 95.3 CM/SEC
BH CV ECHO MEAS - LV V1 MEAN: 66.3 CM/SEC
BH CV ECHO MEAS - LV V1 VTI: 20.5 CM
BH CV ECHO MEAS - LVIDD: 4.4 CM
BH CV ECHO MEAS - LVIDS: 3.6 CM
BH CV ECHO MEAS - LVLD AP2: 8 CM
BH CV ECHO MEAS - LVLD AP4: 7.3 CM
BH CV ECHO MEAS - LVLS AP2: 6.1 CM
BH CV ECHO MEAS - LVLS AP4: 5.7 CM
BH CV ECHO MEAS - LVOT AREA (M): 3.5 CM^2
BH CV ECHO MEAS - LVOT AREA: 3.5 CM^2
BH CV ECHO MEAS - LVOT DIAM: 2.1 CM
BH CV ECHO MEAS - LVPWD: 1.1 CM
BH CV ECHO MEAS - MED PEAK E' VEL: 5.8 CM/SEC
BH CV ECHO MEAS - MR MAX PG: 52.1 MMHG
BH CV ECHO MEAS - MR MAX VEL: 361 CM/SEC
BH CV ECHO MEAS - MV A MAX VEL: 68.6 CM/SEC
BH CV ECHO MEAS - MV DEC SLOPE: 183 CM/SEC^2
BH CV ECHO MEAS - MV DEC TIME: 0.27 SEC
BH CV ECHO MEAS - MV E MAX VEL: 44.8 CM/SEC
BH CV ECHO MEAS - MV E/A: 0.65
BH CV ECHO MEAS - MV MAX PG: 3.1 MMHG
BH CV ECHO MEAS - MV MEAN PG: 1 MMHG
BH CV ECHO MEAS - MV P1/2T MAX VEL: 49.3 CM/SEC
BH CV ECHO MEAS - MV P1/2T: 78.9 MSEC
BH CV ECHO MEAS - MV V2 MAX: 88.4 CM/SEC
BH CV ECHO MEAS - MV V2 MEAN: 42.5 CM/SEC
BH CV ECHO MEAS - MV V2 VTI: 25.7 CM
BH CV ECHO MEAS - MVA P1/2T LCG: 4.5 CM^2
BH CV ECHO MEAS - MVA(P1/2T): 2.8 CM^2
BH CV ECHO MEAS - MVA(VTI): 2.8 CM^2
BH CV ECHO MEAS - PA ACC TIME: 0.11 SEC
BH CV ECHO MEAS - PA MAX PG (FULL): 4.1 MMHG
BH CV ECHO MEAS - PA MAX PG: 5.7 MMHG
BH CV ECHO MEAS - PA PR(ACCEL): 29.1 MMHG
BH CV ECHO MEAS - PA V2 MAX: 119 CM/SEC
BH CV ECHO MEAS - PI END-D VEL: 144 CM/SEC
BH CV ECHO MEAS - PULM A REVS DUR: 0.12 SEC
BH CV ECHO MEAS - PULM A REVS VEL: 33.8 CM/SEC
BH CV ECHO MEAS - PULM DIAS VEL: 26.6 CM/SEC
BH CV ECHO MEAS - PULM S/D: 1.5
BH CV ECHO MEAS - PULM SYS VEL: 39.8 CM/SEC
BH CV ECHO MEAS - PVA(V,A): 2.2 CM^2
BH CV ECHO MEAS - PVA(V,D): 2.2 CM^2
BH CV ECHO MEAS - QP/QS: 0.73
BH CV ECHO MEAS - RAP SYSTOLE: 3 MMHG
BH CV ECHO MEAS - RV MAX PG: 1.5 MMHG
BH CV ECHO MEAS - RV MEAN PG: 1 MMHG
BH CV ECHO MEAS - RV V1 MAX: 62 CM/SEC
BH CV ECHO MEAS - RV V1 MEAN: 43 CM/SEC
BH CV ECHO MEAS - RV V1 VTI: 12.5 CM
BH CV ECHO MEAS - RVOT AREA: 4.2 CM^2
BH CV ECHO MEAS - RVOT DIAM: 2.3 CM
BH CV ECHO MEAS - RVSP: 32 MMHG
BH CV ECHO MEAS - SI(CUBED): 19 ML/M^2
BH CV ECHO MEAS - SI(LVOT): 35 ML/M^2
BH CV ECHO MEAS - SI(MOD-SP2): 23.7 ML/M^2
BH CV ECHO MEAS - SI(MOD-SP4): 22.2 ML/M^2
BH CV ECHO MEAS - SI(TEICH): 16.4 ML/M^2
BH CV ECHO MEAS - SUP REN AO DIAM: 1.8 CM
BH CV ECHO MEAS - SV(CUBED): 38.5 ML
BH CV ECHO MEAS - SV(LVOT): 71 ML
BH CV ECHO MEAS - SV(MOD-SP2): 48 ML
BH CV ECHO MEAS - SV(MOD-SP4): 45 ML
BH CV ECHO MEAS - SV(RVOT): 51.9 ML
BH CV ECHO MEAS - SV(TEICH): 33.3 ML
BH CV ECHO MEAS - TAPSE (>1.6): 2.4 CM
BH CV ECHO MEAS - TR MAX VEL: 271 CM/SEC
BH CV ECHO MEASUREMENTS AVERAGE E/E' RATIO: 6.69
BH CV XLRA - RV BASE: 3.2 CM
BH CV XLRA - RV LENGTH: 6.7 CM
BH CV XLRA - RV MID: 1.9 CM
BH CV XLRA - TDI S': 12.9 CM/SEC
BILIRUB SERPL-MCNC: 0.7 MG/DL (ref 0–1.2)
BUN SERPL-MCNC: 14 MG/DL (ref 8–23)
BUN/CREAT SERPL: 18.2 (ref 7–25)
CALCIUM SPEC-SCNC: 9.1 MG/DL (ref 8.6–10.5)
CHLORIDE SERPL-SCNC: 107 MMOL/L (ref 98–107)
CHOLEST SERPL-MCNC: 157 MG/DL (ref 0–200)
CO2 SERPL-SCNC: 26.9 MMOL/L (ref 22–29)
CREAT SERPL-MCNC: 0.77 MG/DL (ref 0.76–1.27)
DEPRECATED RDW RBC AUTO: 38.5 FL (ref 37–54)
ERYTHROCYTE [DISTWIDTH] IN BLOOD BY AUTOMATED COUNT: 11.9 % (ref 12.3–15.4)
GFR SERPL CREATININE-BSD FRML MDRD: 96 ML/MIN/1.73
GLOBULIN UR ELPH-MCNC: 1.9 GM/DL
GLUCOSE BLDC GLUCOMTR-MCNC: 103 MG/DL (ref 70–130)
GLUCOSE BLDC GLUCOMTR-MCNC: 115 MG/DL (ref 70–130)
GLUCOSE SERPL-MCNC: 105 MG/DL (ref 65–99)
HBA1C MFR BLD: 5.7 % (ref 4.8–5.6)
HCT VFR BLD AUTO: 44 % (ref 37.5–51)
HDLC SERPL-MCNC: 31 MG/DL (ref 40–60)
HGB BLD-MCNC: 15 G/DL (ref 13–17.7)
LDLC SERPL CALC-MCNC: 103 MG/DL (ref 0–100)
LDLC/HDLC SERPL: 3.26 {RATIO}
LEFT ATRIUM VOLUME INDEX: 22.7 ML/M2
LV EF 2D ECHO EST: 65 %
MAXIMAL PREDICTED HEART RATE: 134 BPM
MCH RBC QN AUTO: 30.5 PG (ref 26.6–33)
MCHC RBC AUTO-ENTMCNC: 34.1 G/DL (ref 31.5–35.7)
MCV RBC AUTO: 89.4 FL (ref 79–97)
PA ADP PRP-ACNC: 184 PRU (ref 194–418)
PLATELET # BLD AUTO: 243 10*3/MM3 (ref 140–450)
PMV BLD AUTO: 9.7 FL (ref 6–12)
POTASSIUM SERPL-SCNC: 4.4 MMOL/L (ref 3.5–5.2)
PROT SERPL-MCNC: 5.6 G/DL (ref 6–8.5)
RBC # BLD AUTO: 4.92 10*6/MM3 (ref 4.14–5.8)
SINUS: 3.2 CM
SODIUM SERPL-SCNC: 141 MMOL/L (ref 136–145)
STJ: 2.6 CM
STRESS TARGET HR: 114 BPM
TRIGL SERPL-MCNC: 125 MG/DL (ref 0–150)
VLDLC SERPL-MCNC: 23 MG/DL (ref 5–40)
WBC # BLD AUTO: 8.3 10*3/MM3 (ref 3.4–10.8)

## 2021-02-25 PROCEDURE — 83036 HEMOGLOBIN GLYCOSYLATED A1C: CPT | Performed by: NURSE PRACTITIONER

## 2021-02-25 PROCEDURE — 99214 OFFICE O/P EST MOD 30 MIN: CPT | Performed by: NURSE PRACTITIONER

## 2021-02-25 PROCEDURE — 93306 TTE W/DOPPLER COMPLETE: CPT | Performed by: INTERNAL MEDICINE

## 2021-02-25 PROCEDURE — 85027 COMPLETE CBC AUTOMATED: CPT | Performed by: NURSE PRACTITIONER

## 2021-02-25 PROCEDURE — 93306 TTE W/DOPPLER COMPLETE: CPT

## 2021-02-25 PROCEDURE — G0378 HOSPITAL OBSERVATION PER HR: HCPCS

## 2021-02-25 PROCEDURE — 80061 LIPID PANEL: CPT | Performed by: NURSE PRACTITIONER

## 2021-02-25 PROCEDURE — 80053 COMPREHEN METABOLIC PANEL: CPT | Performed by: NURSE PRACTITIONER

## 2021-02-25 PROCEDURE — 85576 BLOOD PLATELET AGGREGATION: CPT | Performed by: PSYCHIATRY & NEUROLOGY

## 2021-02-25 PROCEDURE — 82962 GLUCOSE BLOOD TEST: CPT

## 2021-02-25 RX ORDER — CLOPIDOGREL BISULFATE 75 MG/1
75 TABLET ORAL DAILY
Qty: 30 TABLET | Refills: 0 | Status: SHIPPED | OUTPATIENT
Start: 2021-02-26 | End: 2021-03-03 | Stop reason: SDUPTHER

## 2021-02-25 RX ORDER — ASPIRIN 81 MG/1
81 TABLET, CHEWABLE ORAL DAILY
Qty: 30 TABLET | Refills: 0 | Status: SHIPPED | OUTPATIENT
Start: 2021-02-26 | End: 2021-03-29 | Stop reason: SDUPTHER

## 2021-02-25 RX ORDER — ATORVASTATIN CALCIUM 80 MG/1
80 TABLET, FILM COATED ORAL NIGHTLY
Qty: 30 TABLET | Refills: 0 | Status: SHIPPED | OUTPATIENT
Start: 2021-02-25 | End: 2021-03-03 | Stop reason: SDUPTHER

## 2021-02-25 RX ORDER — ASPIRIN 81 MG/1
81 TABLET, CHEWABLE ORAL DAILY
Status: DISCONTINUED | OUTPATIENT
Start: 2021-02-26 | End: 2021-02-25 | Stop reason: HOSPADM

## 2021-02-25 RX ADMIN — CLOPIDOGREL 75 MG: 75 TABLET, FILM COATED ORAL at 08:10

## 2021-02-25 RX ADMIN — SODIUM CHLORIDE, PRESERVATIVE FREE 10 ML: 5 INJECTION INTRAVENOUS at 08:10

## 2021-02-25 RX ADMIN — DIGOXIN 250 MCG: 250 TABLET ORAL at 08:10

## 2021-02-25 RX ADMIN — CETIRIZINE HYDROCHLORIDE ALLERGY 10 MG: 10 TABLET ORAL at 08:10

## 2021-02-25 RX ADMIN — ASPIRIN 325 MG: 325 TABLET ORAL at 08:10

## 2021-02-26 ENCOUNTER — TRANSITIONAL CARE MANAGEMENT TELEPHONE ENCOUNTER (OUTPATIENT)
Dept: CALL CENTER | Facility: HOSPITAL | Age: 86
End: 2021-02-26

## 2021-02-26 NOTE — OUTREACH NOTE
Prep Survey      Responses   [facility]?  [facility]   Is LACE score < 7 ?  No   Emergency Room discharge w/ pulse ox?  No   Eligibility  [facility]   Date of Admission  02/24/21   Date of Discharge  02/25/21   Discharge Disposition  Home or Self Care   Discharge diagnosis  Acute CVA, HTN   Does the patient have one of the following disease processes/diagnoses(primary or secondary)?  Stroke (TIA)   Does the patient have Home health ordered?  No   Is there a DME ordered?  No   Prep survey completed?  Yes          Elizabeth Arvizu RN

## 2021-02-26 NOTE — OUTREACH NOTE
Call Center TCM Note      Responses   [facility] patient discharged from?  [place]   Does the patient have one of the following disease processes/diagnoses(primary or secondary)?  Stroke (TIA)   TCM attempt successful?  No   Unsuccessful attempts  Attempt 1          Carol Ren RN    2/26/2021, 13:01 EST

## 2021-02-26 NOTE — OUTREACH NOTE
Call Center TCM Note      Responses   [facility] patient discharged from?  [place]   Does the patient have one of the following disease processes/diagnoses(primary or secondary)?  Stroke (TIA)   TCM attempt successful?  No   Unsuccessful attempts  Attempt 2          Carol Ren RN    2/26/2021, 15:39 EST

## 2021-02-27 ENCOUNTER — TRANSITIONAL CARE MANAGEMENT TELEPHONE ENCOUNTER (OUTPATIENT)
Dept: CALL CENTER | Facility: HOSPITAL | Age: 86
End: 2021-02-27

## 2021-02-27 NOTE — OUTREACH NOTE
Call Center TCM Note      Responses   [facility] patient discharged from?  [place]   Does the patient have one of the following disease processes/diagnoses(primary or secondary)?  Stroke (TIA)   TCM attempt successful?  No   Unsuccessful attempts  Attempt 3          Shelton Jones RN    2/27/2021, 14:34 EST

## 2021-03-01 ENCOUNTER — TREATMENT (OUTPATIENT)
Dept: PHYSICAL THERAPY | Facility: CLINIC | Age: 86
End: 2021-03-01

## 2021-03-01 DIAGNOSIS — H81.11 BPPV (BENIGN PAROXYSMAL POSITIONAL VERTIGO), RIGHT: Primary | ICD-10-CM

## 2021-03-01 PROCEDURE — 95992 CANALITH REPOSITIONING PROC: CPT | Performed by: PHYSICAL THERAPIST

## 2021-03-01 PROCEDURE — 97161 PT EVAL LOW COMPLEX 20 MIN: CPT | Performed by: PHYSICAL THERAPIST

## 2021-03-01 NOTE — PROGRESS NOTES
Case Management Discharge Note      Final Note: Pt was dc'd home         Selected Continued Care - Discharged on 2/25/2021 Admission date: 2/24/2021 - Discharge disposition: Home or Self Care    Destination    No services have been selected for the patient.              Durable Medical Equipment    No services have been selected for the patient.              Dialysis/Infusion    No services have been selected for the patient.              Home Medical Care    No services have been selected for the patient.              Therapy    No services have been selected for the patient.              Community Resources    No services have been selected for the patient.                  Transportation Services  Private: Car    Final Discharge Disposition Code: 01 - home or self-care

## 2021-03-01 NOTE — PROGRESS NOTES
Physical Therapy Initial Evaluation-  Vestibular Therapy    Patient Name: Mingo Aquino       Patient MRN: [id number]  : 1934  Physician:Adolfo Adame MD  Date: 3/1/2021  Encounter Diagnoses   Name Primary?   • BPPV (benign paroxysmal positional vertigo), right Yes       Objective Testing:  Positional Testing  Positional Testing: With infrared goggles  Vertebrobasilar Artery Screen - Right: Negative  Vertebrobasilar Artery Screen - Left: Negative  Rhea-Hallpike Right: Upbeat, right rotatory nystagmus(then rightbeating)  Rhea-Hallpike Right Onset Time : 3 sec  [place]-Hallpike Right Duration Time : 15 sec  Rhea-Hallpike Left: (NA)  Horizontal Roll Test Right: (NA)  Horizontal Roll Test Left: (NA)     Occulomotor Exam Fixation Present  Spontaneous Nystagmus: Absent  VOR with Occulomotor Exam Fixation Absent   Spontaneous Nystagmus: Absent   DHI: 34/100, mod perception of handicap    THERAPY ASSESSMENT: Patient is a 86 y.o. male. Patient presents to physical therapy due to complaints of episodes of dizziness that started 3 weeks ago. He was reporting dizziness, diplopia and L arm coordination issues. He has since been hospitalized due to abnormality seen on MRI of brain indicating subacute infarcts of the lashawn. His L arm issues and diplopia are better. He continues to have episodes of dizziness. Patient has a hx of BPPV in the R PC for which he was treated successfully in Dec 2019. Today he was tested for BPPV again and had a positive R DH test indicating R PC canalithiasis BPPV. Testing of all other canals was deferred to treat R PC with CRP. Tolerated well. I suspect he may also have LC BPPV given the lateral nystagmus observed during treatment today. Patient is a good candidate for physical therapy to address the following:    Functional Limitations: Difficulty moving, Decreased ability to perform ADL's   Length of Therapy: 1 month   PT Frequency: PT 2x week   PT Interventions: Home Exercise Program,  CRP  Patient Agrees with Plan of Care: Yes    REHAB POTENTIAL: excellent            SHORT TERM GOALS: To be met in 2 weeks:  1. Patient is independent with HEP.  2. Patient will report at least 30% improvement in overall condition.  LONG TERM GOALS:To be met in 4 weeks:  1. Patient will report decreased disequilibrium/dizziness by at least 90%.  2. Patient will report no loss of balance with ADLs to demonstrate improved functional balance.  3. Patient denies dizziness with daily activity.  4. DHI score is less than 10.     Other Procedure CPT 26741 minutes 0    Timed Code Treatment: 0   Minutes     Total Treatment Time: 50      Minutes      PT SIGNATURE: Alejandra Estevez PT, DPT, CHT, MARIA ESTHER   [place] license #[id number]  DATE TREATMENT INITIATED: 3/1/2021     Initial Certification  Certification Period: 5/30/2021  I certify that the therapy services are furnished while this patient is under my care.  The services outlined above are required by this patient, and will be reviewed every 90 days.     PHYSICIAN: Adolfo Adame MD      DATE:     Please sign and return via fax to [phone].. Thank you, [facility].

## 2021-03-03 ENCOUNTER — OFFICE VISIT (OUTPATIENT)
Dept: FAMILY MEDICINE CLINIC | Facility: CLINIC | Age: 86
End: 2021-03-03

## 2021-03-03 VITALS
BODY MASS INDEX: 24.37 KG/M2 | HEART RATE: 73 BPM | WEIGHT: 179.9 LBS | OXYGEN SATURATION: 97 % | HEIGHT: 72 IN | DIASTOLIC BLOOD PRESSURE: 68 MMHG | TEMPERATURE: 97.3 F | SYSTOLIC BLOOD PRESSURE: 122 MMHG

## 2021-03-03 DIAGNOSIS — I47.1 PAROXYSMAL SVT (SUPRAVENTRICULAR TACHYCARDIA) (HCC): ICD-10-CM

## 2021-03-03 DIAGNOSIS — Z09 HOSPITAL DISCHARGE FOLLOW-UP: Primary | ICD-10-CM

## 2021-03-03 DIAGNOSIS — E78.5 DYSLIPIDEMIA: ICD-10-CM

## 2021-03-03 DIAGNOSIS — I63.22 BASILAR ARTERY OCCLUSION WITH CEREBRAL INFARCTION (HCC): ICD-10-CM

## 2021-03-03 PROCEDURE — 99214 OFFICE O/P EST MOD 30 MIN: CPT | Performed by: FAMILY MEDICINE

## 2021-03-03 RX ORDER — ATORVASTATIN CALCIUM 80 MG/1
80 TABLET, FILM COATED ORAL NIGHTLY
Qty: 90 TABLET | Refills: 1 | Status: SHIPPED | OUTPATIENT
Start: 2021-03-03 | End: 2021-09-29 | Stop reason: SDUPTHER

## 2021-03-03 RX ORDER — CLOPIDOGREL BISULFATE 75 MG/1
75 TABLET ORAL DAILY
Qty: 90 TABLET | Refills: 1 | Status: SHIPPED | OUTPATIENT
Start: 2021-03-03 | End: 2021-09-29 | Stop reason: SDUPTHER

## 2021-03-03 NOTE — PROGRESS NOTES
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  Noted after symptoms of left hand clumsiness and some diplopia and fatigue.  He also at the same time had benign positional vertigo which resolved after vestibular physical therapy.  He has a known history of paroxysmal atrial tachycardia.  With palpitations.  He is followed by cardiology.  He continues on beta-blockade and digoxin prescribed by cardiology.  Given the pontine infarcts he was placed on maximum dose atorvastatin during the hospitalization.  In addition he was placed on dual antiplatelet therapy with Plavix and aspirin.  He continues both.  He has had no recurrent symptoms.  The left hand clumsiness is now much better.  He has no vertigo.  He feels good overall.  He said no bleeding episodes.  No blood in the urine.  No blood in stool.  No nosebleeds.  He does complain of some increased palpitations first thing in the morning when he gets up and walks around.  Some pounding of his heart but not menstrually racing.  He has had no syncopal or presyncopal episodes.  Also happen sometimes when he is sitting.       Objective   Vital Signs:   /68   Pulse 73   Temp 97.3 °F (36.3 °C) (Temporal)   Ht 182.9 cm (72.01\")   Wt 81.6 kg (179 lb 14.4 oz)   SpO2 97%   BMI 24.39 kg/m²     Physical Exam  Vitals signs and nursing note reviewed.   Constitutional:       General: He is not in acute distress.     Appearance: He is well-developed.   Neck:      Thyroid: No thyromegaly.   Cardiovascular:      Rate and Rhythm: Normal rate and regular rhythm.      Heart sounds: Normal heart sounds.   Pulmonary:      Effort: Pulmonary effort is normal.      Breath sounds: Normal breath sounds.   Skin:     General: Skin is warm and dry. "   Neurological:      Comments: Neurological exam.  Pupils equal and reactive to light.  Extra ocular muscle movements intact all directions.  Face symmetric.  Gait unremarkable.  No ataxia.  No dysmetria.  Excellent rapid alternating movements including finger-to-nose.     Psychiatric:         Behavior: Behavior normal.         Thought Content: Thought content normal.         Judgment: Judgment normal.        Result Review :                 Assessment and Plan    Diagnoses and all orders for this visit:    1. 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 Dyslipidemia    4. Paroxysmal SVT (supraventricular tachycardia) (CMS/HCC)    Other orders  -     atorvastatin (LIPITOR) 80 MG tablet; Take 1 tablet by mouth Every Night.  Dispense: 90 tablet; Refill: 1  -     clopidogrel (PLAVIX) 75 MG tablet; Take 1 tablet by mouth Daily.  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  Symptoms are improving.  Continue dual antiplatelet therapy.  Continue beta-blockade.  He has had some some ongoing palpitations.  The echocardiogram demonstrated no evidence of atrial clot or other definite cause of thromboembolism.  He did not have evidence of atrial fibrillation during the hospital stay.  He may do well with a ambulatory cardiac monitoring.  However I would recommend he see his cardiologist at [facility] to further evaluate this.  He is going to contact his cardiologist soon for follow-up.  There is no urgency.  Exam today is unremarkable.  Otherwise I will see him back as scheduled in a couple of months.  He will call with concerns.  He will continue the medication.      Follow Up   No follow-ups on file.  Patient was given instructions and counseling regarding his condition or for health maintenance advice. Please see specific information pulled into the AVS if appropriate.

## 2021-03-05 ENCOUNTER — READMISSION MANAGEMENT (OUTPATIENT)
Dept: CALL CENTER | Facility: HOSPITAL | Age: 86
End: 2021-03-05

## 2021-03-05 NOTE — OUTREACH NOTE
Stroke Week 2 Survey      Responses   [facility] [place] patient discharged from?  [place]   Does the patient have one of the following disease processes/diagnoses(primary or secondary)?  Stroke (TIA)   Week 2 attempt successful?  Yes   Call start time  1644   Call end time  1648   Discharge diagnosis  Acute CVA, HTN   Meds reviewed with patient/caregiver?  Yes   Is the patient having any side effects they believe may be caused by any medication additions or changes?  No   Does the patient have all medications ordered at discharge?  Yes   Is the patient taking all medications as directed (includes completed medication regime)?  Yes   Does the patient have a primary care provider?   Yes   Does the patient have an appointment with their PCP within 7 days of discharge?  Yes   Has the patient kept scheduled appointments due by today?  Yes   Comments  Saw PCP this week.   Has home health visited the patient within 72 hours of discharge?  N/A   Psychosocial issues?  No   Does the patient require any assistance with activities of daily living such as eating, bathing, dressing, walking, etc.?  No   Does the patient have any residual symptoms from stroke/TIA?  No   Does the patient understand the diet ordered at discharge?  Yes   Comments  Discouraged salt, red meat and fried food.   Did the patient receive a copy of their discharge instructions?  Yes   Nursing interventions  Reviewed instructions with patient   What is the patient's perception of their health status since discharge?  Improving   Nursing interventions  Nurse provided patient education   Is the patient able to teach back FAST for Stroke?  Yes   Is the patient/caregiver able to teach back the risk factors for a stroke?  High Cholesterol, Sleep apnea   Is the patient/caregiver able to teach back signs and symptoms related to disease process for when to call PCP?  Yes   Is the patient/caregiver able to teach back signs and symptoms related to disease process  for when to call 911?  Yes   If the patient is a current smoker, are they able to teach back resources for cessation?  Not a smoker   Is the patient/caregiver able to teach back the hierarchy of who to call/visit for symptoms/problems? PCP, Specialist, Home health nurse, Urgent Care, ED, 911  Yes   Additional teach back comments  He has read his educational materials. He is improving daily.   Week 2 call completed?  Yes   Wrap up additional comments  Denies questions at this time.          Olga Moise RN

## 2021-03-08 ENCOUNTER — TREATMENT (OUTPATIENT)
Dept: PHYSICAL THERAPY | Facility: CLINIC | Age: 86
End: 2021-03-08

## 2021-03-08 DIAGNOSIS — R26.89 IMBALANCE: ICD-10-CM

## 2021-03-08 DIAGNOSIS — H81.11 BPPV (BENIGN PAROXYSMAL POSITIONAL VERTIGO), RIGHT: Primary | ICD-10-CM

## 2021-03-08 PROCEDURE — 95992 CANALITH REPOSITIONING PROC: CPT | Performed by: PHYSICAL THERAPIST

## 2021-03-08 NOTE — PROGRESS NOTES
Vestibular Daily Progress Note    Visit#:2  Subjective   Dizziness is better but I am still having some. I am about 70% improved since the first treatment.   Objective         Positional Testing  Positional Testing: With infrared goggles  [place]-Hallpike Right: Upbeat, right rotatory nystagmus  [place]-Hallpike Right Onset Time : 3 sec  Rhea-Hallpike Right Duration Time : 15 sec          PROCEDURES AND MODALITIES:  Paraffin:    Moist Heat:    Ice:    E-Stim:    Ultrasound:    Ionto:   Traction:    See Exercise, Manual, and Modality Logs for complete treatment.   Other Procedure CPT 24460 minutes 0       Timed Code Treatment: 0 Minutes  Total Treatment Time: 30 Minutes    Assessment/Plan   Patient continues to have R PC canalithiasis BPPV. He is significantly improved overall. Encouraged continued R Epley performance at home. There was lateral nystagmus observed during treatment that may need to be addressed once R PC is cleared.     Progress per Plan of Care    Alejandra Estevez, PT, DPT, CHT, CIDN  Physical Therapist

## 2021-03-12 ENCOUNTER — TREATMENT (OUTPATIENT)
Dept: PHYSICAL THERAPY | Facility: CLINIC | Age: 86
End: 2021-03-12

## 2021-03-12 DIAGNOSIS — H81.11 BPPV (BENIGN PAROXYSMAL POSITIONAL VERTIGO), RIGHT: Primary | ICD-10-CM

## 2021-03-12 DIAGNOSIS — R26.89 IMBALANCE: ICD-10-CM

## 2021-03-12 PROCEDURE — 95992 CANALITH REPOSITIONING PROC: CPT | Performed by: PHYSICAL THERAPIST

## 2021-03-12 NOTE — PROGRESS NOTES
Vestibular Daily Progress Note    Visit#:3  Subjective   I have not had any dizziness but I can't tell if it is completely gone.   Objective         Positional Testing  Positional Testing: With infrared goggles  [place]-Hallpike Right: No nystagmus  [place]-Hallpike Left: No nystagmus  Horizontal Roll Test Right: Right-beating  Horizontal Roll Test Right Onset Time : 10 sec, 50 sec duration (stronger than L)  Horizontal Roll Test Left: Left-beating  Horizontal Roll Test Left Onset Time : 13 sec          PROCEDURES AND MODALITIES:  Paraffin:    Moist Heat:    Ice:    E-Stim:    Ultrasound:    Ionto:   Traction:    See Exercise, Manual, and Modality Logs for complete treatment.   Other Procedure CPT 44499 minutes 0       Timed Code Treatment: 0 Minutes  Total Treatment Time: 30 Minutes    Assessment/Plan   R PC canalithiasis BPPV is resolved. All other canals were tested today and patient tested positive for R LC canalithiasis BPPV. He was asymptomatic with this but it could be affecting his balance. Treated w/ R BBQ roll today and gave for HEP. Patient is significantly better and anticipate DC after next visit.     Progress per Plan of Care    Alejandra Estevez, PT, DPT, CHT, CIDN  Physical Therapist

## 2021-03-15 ENCOUNTER — READMISSION MANAGEMENT (OUTPATIENT)
Dept: CALL CENTER | Facility: HOSPITAL | Age: 86
End: 2021-03-15

## 2021-03-15 NOTE — OUTREACH NOTE
Stroke Week 3 Survey      Responses   [facility] patient discharged from?  [place]   Does the patient have one of the following disease processes/diagnoses(primary or secondary)?  Stroke (TIA)   Week 3 attempt successful?  No   Unsuccessful attempts  Attempt 1          Shonna Engel RN

## 2021-03-17 ENCOUNTER — READMISSION MANAGEMENT (OUTPATIENT)
Dept: CALL CENTER | Facility: HOSPITAL | Age: 86
End: 2021-03-17

## 2021-03-17 NOTE — OUTREACH NOTE
Stroke Week 3 Survey      Responses   [facility] patient discharged from?  [place]   Does the patient have one of the following disease processes/diagnoses(primary or secondary)?  Stroke (TIA)   Week 3 attempt successful?  Yes   Call start time  1121   Call end time  1126   Medication alerts for this patient  meds reviewed   Meds reviewed with patient/caregiver?  Yes   Is the patient taking all medications as directed (includes completed medication regime)?  Yes   Comments regarding appointments  has lept appointment  as scheuled   Does the patient have a primary care provider?   Yes   Has the patient kept scheduled appointments due by today?  Yes   Has home health visited the patient within 72 hours of discharge?  N/A   Does the patient require any assistance with activities of daily living such as eating, bathing, dressing, walking, etc.?  Yes   Does the patient have any residual symptoms from stroke/TIA?  No   Does the patient understand the diet ordered at discharge?  Yes   What is the patient's perception of their health status since discharge?  Improving [improving but gets fatigue easily]   Is the patient able to teach back FAST for Stroke?  Yes [reviewed with the patient]   Is the patient/caregiver able to teach back the risk factors for a stroke?  High blood pressure-goal below 120/80 [silverio not check bp in a regular basis]   Is the patient/caregiver able to teach back signs and symptoms related to disease process for when to call PCP?  Yes   Is the patient/caregiver able to teach back signs and symptoms related to disease process for when to call 911?  Yes   If the patient is a current smoker, are they able to teach back resources for cessation?  Not a smoker   Is the patient/caregiver able to teach back the hierarchy of who to call/visit for symptoms/problems? PCP, Specialist, Home health nurse, Urgent Care, ED, 911  Yes   Week 3 call completed?  Yes   Wrap up additional comments  has no residual  but feels has some vision carlos, has recieved his first dose of the COVID vaccine today          Yohana Wilde RN

## 2021-03-29 RX ORDER — ASPIRIN 81 MG/1
81 TABLET, CHEWABLE ORAL DAILY
Qty: 30 TABLET | Refills: 0 | Status: SHIPPED | OUTPATIENT
Start: 2021-03-29 | End: 2021-05-03

## 2021-03-29 NOTE — TELEPHONE ENCOUNTER
Caller: Mingo Aquino    Relationship: Self    Best call back number: [phone]     Medication needed:   Requested Prescriptions     Pending Prescriptions Disp Refills   • aspirin 81 MG chewable tablet 30 tablet 0     Sig: Chew 1 tablet Daily.       When do you need the refill by: ASAP    What additional details did the patient provide when requesting the medication: NO MORE REFILLS PLEASE LET PATIENT KNOW IF HE NEEDS AN APPOINTMENT FOR THIS PRESCRIPTION.     Does the patient have less than a 3 day supply:  [x] Yes  [] No    What is the patient's preferred pharmacy:       [id number] [place], [place] - [address] AT [facility] [address] - [phone]  - [phone]   [phone]

## 2021-05-03 ENCOUNTER — OFFICE VISIT (OUTPATIENT)
Dept: FAMILY MEDICINE CLINIC | Facility: CLINIC | Age: 86
End: 2021-05-03

## 2021-05-03 VITALS
OXYGEN SATURATION: 97 % | TEMPERATURE: 97.5 F | BODY MASS INDEX: 23.83 KG/M2 | WEIGHT: 175.9 LBS | SYSTOLIC BLOOD PRESSURE: 123 MMHG | HEART RATE: 81 BPM | HEIGHT: 72 IN | DIASTOLIC BLOOD PRESSURE: 71 MMHG

## 2021-05-03 DIAGNOSIS — I63.22 BASILAR ARTERY OCCLUSION WITH CEREBRAL INFARCTION (HCC): ICD-10-CM

## 2021-05-03 DIAGNOSIS — Z00.00 MEDICARE ANNUAL WELLNESS VISIT, SUBSEQUENT: Primary | ICD-10-CM

## 2021-05-03 PROCEDURE — 99213 OFFICE O/P EST LOW 20 MIN: CPT | Performed by: FAMILY MEDICINE

## 2021-05-03 PROCEDURE — G0439 PPPS, SUBSEQ VISIT: HCPCS | Performed by: FAMILY MEDICINE

## 2021-05-03 PROCEDURE — 1159F MED LIST DOCD IN RCRD: CPT | Performed by: FAMILY MEDICINE

## 2021-05-03 PROCEDURE — G0009 ADMIN PNEUMOCOCCAL VACCINE: HCPCS | Performed by: FAMILY MEDICINE

## 2021-05-03 PROCEDURE — 1170F FXNL STATUS ASSESSED: CPT | Performed by: FAMILY MEDICINE

## 2021-05-03 PROCEDURE — 90732 PPSV23 VACC 2 YRS+ SUBQ/IM: CPT | Performed by: FAMILY MEDICINE

## 2021-05-03 RX ORDER — ASPIRIN 81 MG/1
81 TABLET ORAL DAILY
COMMUNITY

## 2021-05-03 NOTE — PROGRESS NOTES
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  February of this year.  The left arm clumsiness is resolved.  He has no trouble with gait or coordination.  He states he has occasional trouble with his memory but otherwise doing well.  Good physically active.  He is taking the atorvastatin metoprolol and dual antiplatelet including aspirin 81 mg a day and Plavix 75 mg a day.  No bleeding episodes.  No blood in the urine.  No blood in the stool.    Objective   Vital Signs:   /71   Pulse 81   Temp 97.5 °F (36.4 °C) (Temporal)   Ht 182.9 cm (72.01\")   Wt 79.8 kg (175 lb 14.4 oz)   SpO2 97%   BMI 23.85 kg/m²     Physical Exam  Vitals and nursing note reviewed.   Constitutional:       General: He is not in acute distress.     Appearance: He is well-developed.   Neck:      Thyroid: No thyromegaly.   Cardiovascular:      Rate and Rhythm: Normal rate and regular rhythm.      Heart sounds: Normal heart sounds.   Pulmonary:      Effort: Pulmonary effort is normal.      Breath sounds: Normal breath sounds.   Skin:     General: Skin is warm and dry.   Psychiatric:         Behavior: Behavior normal.         Thought Content: Thought content normal.         Judgment: Judgment normal.        Result Review :   The following data was reviewed by: Adolfo Adame MD on 05/03/2021:  Common labs    Common Labsle 2/24/21 2/24/21 2/25/21 2/25/21 2/25/21 2/25/21    1150 1150 0810 0811 0811 0811   Glucose  116 (A)    105 (A)   BUN  14    14   Creatinine  0.88    0.77   eGFR Non African Am  82    96   Sodium  139    141   Potassium  3.9    4.4   Chloride  105    107   Calcium  9.5    9.1   Albumin  3.80    3.70   Total Bilirubin  0.5    0.7   Alkaline Phosphatase  52    45   AST (SGOT)  17    13   ALT (SGPT)  17    19   WBC 8.68   8.30   "   Hemoglobin 15.4   15.0     Hematocrit 44.9   44.0     Platelets 248   243     Total Cholesterol     157    Triglycerides     125    HDL Cholesterol     31 (A)    LDL Cholesterol      103 (A)    Hemoglobin A1C   5.70 (A)      (A) Abnormal value                      Assessment and Plan    Diagnoses and all orders for this visit:    1. Medicare annual wellness visit, subsequent (Primary)    2. Basilar artery occlusion with cerebral infarction (CMS/[facility], located within 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  No residual symptomatology.  He is going to continue the aspirin 81 mg a day and Plavix 75 mg a day.  He continues metoprolol.  He continues the atorvastatin.  I will see him back in 6 months for recheck with lab work prior.  He has appointment coming up with neurology in the next few weeks.      Follow Up   No follow-ups on file.  Patient was given instructions and counseling regarding his condition or for health maintenance advice. Please see specific information pulled into the AVS if appropriate.

## 2021-05-03 NOTE — PROGRESS NOTES
The ABCs of the Annual Wellness Visit  Subsequent Medicare Wellness Visit    Chief Complaint   Patient presents with   • Medicare Wellness-subsequent       Subjective   History of Present Illness:  Mingo Aquino is a 86 y.o. male who presents for a Subsequent Medicare Wellness Visit.    HEALTH RISK ASSESSMENT    Recent Hospitalizations:  No hospitalization(s) within the last year.    Current Medical Providers:  Patient Care Team:  Adolfo Adame MD as PCP - General (Family Medicine)    Smoking Status:  Social History     Tobacco Use   Smoking Status Never Smoker   Smokeless Tobacco Never Used       Alcohol Consumption:  Social History     Substance and Sexual Activity   Alcohol Use Yes    Comment: Occasional       Depression Screen:   PHQ-2/PHQ-9 Depression Screening 5/3/2021   Little interest or pleasure in doing things 0   Feeling down, depressed, or hopeless 0   Trouble falling or staying asleep, or sleeping too much -   Feeling tired or having little energy -   Poor appetite or overeating -   Feeling bad about yourself - or that you are a failure or have let yourself or your family down -   Trouble concentrating on things, such as reading the newspaper or watching television -   Moving or speaking so slowly that other people could have noticed. Or the opposite - being so fidgety or restless that you have been moving around a lot more than usual -   Thoughts that you would be better off dead, or of hurting yourself in some way -   Total Score 0   If you checked off any problems, how difficult have these problems made it for you to do your work, take care of things at home, or get along with other people? -       Fall Risk Screen:  STEADI Fall Risk Assessment was completed, and patient is at LOW risk for falls.Assessment completed on:5/3/2021    Health Habits and Functional and Cognitive Screening:  Functional & Cognitive Status 5/3/2021   Do you have difficulty preparing food and eating? No   Do you have  difficulty bathing yourself, getting dressed or grooming yourself? No   Do you have difficulty using the toilet? No   Do you have difficulty moving around from place to place? No   Do you have trouble with steps or getting out of a bed or a chair? No   Current Diet Well Balanced Diet   Dental Exam Up to date   Eye Exam Up to date   Exercise (times per week) 0 times per week   Current Exercise Activities Include None   Do you need help using the phone?  No   Are you deaf or do you have serious difficulty hearing?  Yes   Do you need help with transportation? No   Do you need help shopping? No   Do you need help preparing meals?  No   Do you need help with housework?  No   Do you need help with laundry? No   Do you need help taking your medications? No   Do you need help managing money? No   Do you ever drive or ride in a car without wearing a seat belt? No   Have you felt unusual stress, anger or loneliness in the last month? No   Who do you live with? Alone   If you need help, do you have trouble finding someone available to you? No   Have you been bothered in the last four weeks by sexual problems? No   Do you have difficulty concentrating, remembering or making decisions? No         Does the patient have evidence of cognitive impairment? No    Asprin use counseling:Taking ASA appropriately as indicated    Age-appropriate Screening Schedule:  Refer to the list below for future screening recommendations based on patient's age, sex and/or medical conditions. Orders for these recommended tests are listed in the plan section. The patient has been provided with a written plan.    Health Maintenance   Topic Date Due   • TDAP/TD VACCINES (1 - Tdap) Never done   • ZOSTER VACCINE (1 of 2) Never done   • INFLUENZA VACCINE  08/01/2021          The following portions of the patient's history were reviewed and updated as appropriate: allergies, current medications, past family history, past medical history, past social history,  past surgical history and problem list.    Outpatient Medications Prior to Visit   Medication Sig Dispense Refill   • aspirin 81 MG EC tablet Take 81 mg by mouth Daily.     • atorvastatin (LIPITOR) 80 MG tablet Take 1 tablet by mouth Every Night. 90 tablet 1   • cetirizine (zyrTEC) 10 MG tablet Take 1 tablet by mouth Daily.  5   • clopidogrel (PLAVIX) 75 MG tablet Take 1 tablet by mouth Daily. 90 tablet 1   • DIGOX 250 MCG tablet Take 250 mcg by mouth Daily.  3   • ipratropium (ATROVENT) 0.06 % nasal spray 2 sprays into each nostril As Needed for Rhinitis.     • metoprolol tartrate (LOPRESSOR) 25 MG tablet Take 25 mg by mouth 2 (Two) Times a Day.     • Multiple Vitamin (MULTI-VITAMIN DAILY PO) Take 1 tablet by mouth Daily.     • aspirin 81 MG chewable tablet Chew 1 tablet Daily. 30 tablet 0     No facility-administered medications prior to visit.       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 Patient has an advance directive in EMR which is still valid.     Review of Systems    Compared to one year ago, the patient feels his physical health is better.  Compared to one year ago, the patient feels his mental health is better.    Reviewed chart for potential of high risk medication in the elderly: yes  Reviewed chart for potential of harmful drug interactions in the elderly:yes    Objective         Vitals:    05/03/21 1509   BP: 123/71   Pulse: 81   Temp: 97.5 °F (36.4 °C)   TempSrc: Temporal   SpO2: 97%   Weight: 79.8 kg (175 lb  "14.4 oz)   Height: 182.9 cm (72.01\")       Body mass index is 23.85 kg/m².  Discussed the patient's BMI with him. The BMI is in the acceptable range.    Physical Exam    Lab Results   Component Value Date    TRIG 125 02/25/2021    HDL 31 (L) 02/25/2021     (H) 02/25/2021    VLDL 23 02/25/2021    HGBA1C 5.70 (H) 02/25/2021        Assessment/Plan   Medicare Risks and Personalized Health Plan  CMS Preventative Services Quick Reference  Advance Directive Discussion  Immunizations Discussed/Encouraged (specific immunizations; Pneumococcal 23 and COVID19 )   Continue regular exercise    The above risks/problems have been discussed with the patient.  Pertinent information has been shared with the patient in the After Visit Summary.  Follow up plans and orders are seen below in the Assessment/Plan Section.    There are no diagnoses linked to this encounter.  Follow Up:  No follow-ups on file.     An After Visit Summary and PPPS were given to the patient.               "

## 2021-05-04 ENCOUNTER — TELEPHONE (OUTPATIENT)
Dept: NEUROLOGY | Facility: CLINIC | Age: 86
End: 2021-05-04

## 2021-06-02 ENCOUNTER — OFFICE VISIT (OUTPATIENT)
Dept: NEUROLOGY | Facility: CLINIC | Age: 86
End: 2021-06-02

## 2021-06-02 VITALS
BODY MASS INDEX: 23.7 KG/M2 | HEART RATE: 63 BPM | DIASTOLIC BLOOD PRESSURE: 86 MMHG | HEIGHT: 72 IN | SYSTOLIC BLOOD PRESSURE: 152 MMHG | WEIGHT: 175 LBS | OXYGEN SATURATION: 98 %

## 2021-06-02 DIAGNOSIS — I63.22 BASILAR ARTERY OCCLUSION WITH CEREBRAL INFARCTION (HCC): ICD-10-CM

## 2021-06-02 DIAGNOSIS — Z86.73 HISTORY OF STROKE WITHOUT RESIDUAL DEFICITS: Primary | ICD-10-CM

## 2021-06-02 PROCEDURE — 99215 OFFICE O/P EST HI 40 MIN: CPT | Performed by: NURSE PRACTITIONER

## 2021-09-29 RX ORDER — CLOPIDOGREL BISULFATE 75 MG/1
75 TABLET ORAL DAILY
Qty: 90 TABLET | Refills: 1 | Status: SHIPPED | OUTPATIENT
Start: 2021-09-29 | End: 2022-01-01 | Stop reason: SDUPTHER

## 2021-09-29 RX ORDER — ATORVASTATIN CALCIUM 80 MG/1
80 TABLET, FILM COATED ORAL NIGHTLY
Qty: 90 TABLET | Refills: 1 | Status: SHIPPED | OUTPATIENT
Start: 2021-09-29 | End: 2022-01-01

## 2021-09-29 NOTE — TELEPHONE ENCOUNTER
Rx Refill Note  Requested Prescriptions     Pending Prescriptions Disp Refills   • clopidogrel (PLAVIX) 75 MG tablet 90 tablet 1     Sig: Take 1 tablet by mouth Daily.   • atorvastatin (LIPITOR) 80 MG tablet 90 tablet 1     Sig: Take 1 tablet by mouth Every Night.      Last office visit with prescribing clinician: 5/3/2021      Next office visit with prescribing clinician: 11/3/2021            Eliceo Luna Rep  09/29/21, 16:28 EDTRx Refill Note  Requested Prescriptions     Pending Prescriptions Disp Refills   • clopidogrel (PLAVIX) 75 MG tablet 90 tablet 1     Sig: Take 1 tablet by mouth Daily.      Last office visit with prescribing clinician: 5/3/2021      Next office visit with prescribing clinician: 11/3/2021            Eliceo Luna Rep  09/29/21, 16:28 EDT

## 2021-10-26 DIAGNOSIS — E78.5 DYSLIPIDEMIA: Primary | ICD-10-CM

## 2021-10-26 DIAGNOSIS — I10 BENIGN ESSENTIAL HYPERTENSION: ICD-10-CM

## 2021-10-28 LAB
ALBUMIN SERPL-MCNC: 4.3 G/DL (ref 3.6–4.6)
ALBUMIN/GLOB SERPL: 1.7 {RATIO} (ref 1.2–2.2)
ALP SERPL-CCNC: 51 IU/L (ref 44–121)
ALT SERPL-CCNC: 21 IU/L (ref 0–44)
AST SERPL-CCNC: 21 IU/L (ref 0–40)
BILIRUB SERPL-MCNC: 0.9 MG/DL (ref 0–1.2)
BUN SERPL-MCNC: 12 MG/DL (ref 8–27)
BUN/CREAT SERPL: 13 (ref 10–24)
CALCIUM SERPL-MCNC: 9.7 MG/DL (ref 8.6–10.2)
CHLORIDE SERPL-SCNC: 103 MMOL/L (ref 96–106)
CHOLEST SERPL-MCNC: 138 MG/DL (ref 100–199)
CO2 SERPL-SCNC: 24 MMOL/L (ref 20–29)
CREAT SERPL-MCNC: 0.95 MG/DL (ref 0.76–1.27)
GLOBULIN SER CALC-MCNC: 2.5 G/DL (ref 1.5–4.5)
GLUCOSE SERPL-MCNC: 103 MG/DL (ref 65–99)
HDLC SERPL-MCNC: 44 MG/DL
LDLC SERPL CALC-MCNC: 74 MG/DL (ref 0–99)
POTASSIUM SERPL-SCNC: 4.7 MMOL/L (ref 3.5–5.2)
PROT SERPL-MCNC: 6.8 G/DL (ref 6–8.5)
SODIUM SERPL-SCNC: 140 MMOL/L (ref 134–144)
TRIGL SERPL-MCNC: 110 MG/DL (ref 0–149)
VLDLC SERPL CALC-MCNC: 20 MG/DL (ref 5–40)

## 2021-11-03 ENCOUNTER — OFFICE VISIT (OUTPATIENT)
Dept: FAMILY MEDICINE CLINIC | Facility: CLINIC | Age: 86
End: 2021-11-03

## 2021-11-03 VITALS
DIASTOLIC BLOOD PRESSURE: 78 MMHG | BODY MASS INDEX: 23.84 KG/M2 | WEIGHT: 176 LBS | OXYGEN SATURATION: 98 % | HEIGHT: 72 IN | SYSTOLIC BLOOD PRESSURE: 118 MMHG | HEART RATE: 65 BPM | TEMPERATURE: 97 F | RESPIRATION RATE: 15 BRPM

## 2021-11-03 DIAGNOSIS — K86.89 PANCREATIC MASS: ICD-10-CM

## 2021-11-03 DIAGNOSIS — I10 BENIGN ESSENTIAL HYPERTENSION: Primary | ICD-10-CM

## 2021-11-03 DIAGNOSIS — I63.22 BASILAR ARTERY OCCLUSION WITH CEREBRAL INFARCTION (HCC): ICD-10-CM

## 2021-11-03 DIAGNOSIS — E78.5 DYSLIPIDEMIA: ICD-10-CM

## 2021-11-03 PROCEDURE — 99214 OFFICE O/P EST MOD 30 MIN: CPT | Performed by: FAMILY MEDICINE

## 2021-11-03 NOTE — PROGRESS NOTES
"Chief Complaint  Hypertension    Subjective          Mingo Aquino presents to [facility] PRIMARY [facility]ARE  History of Present Illness     Hypertension follow-up.  He continues on metoprolol 25 mg a day.  Has a history of paroxysmal supraventricular tachycardia.  He also continues on digoxin prescribed by his cardiologist.  His blood pressures are running normal.  No cardiovascular symptoms.    Basal artery occlusion with cerebral infarction February 2021.  The left arm clumsiness has resolved.  He continues on dual antiplatelet therapy indefinitely.  He continues on atorvastatin 80 mg a day.  Lipid panel is improved.  Recent creatinine normal.    Patient was hospitalized in [facility] in 2018.  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  Patient recalls being told that it should never be a problem for him.  He has no abdominal pain weight loss or other concerns.  He has no jaundice.  Recent bilirubin LFTs and alk phos normal.  This was never followed up on.    Objective   Vital Signs:   /78 (BP Location: Left arm, Patient Position: Sitting, Cuff Size: Large Adult)   Pulse 65   Temp 97 °F (36.1 °C) (Temporal)   Resp 15   Ht 182.9 cm (72.01\")   Wt 79.8 kg (176 lb)   SpO2 98%   BMI 23.86 kg/m²     Physical Exam  Constitutional:       Appearance: Normal appearance.   HENT:      Head: Atraumatic.   Cardiovascular:      Rate and Rhythm: Normal rate and regular rhythm.      Pulses: Normal pulses.   Pulmonary:      Effort: Pulmonary effort is normal.   Musculoskeletal:      Cervical back: Normal range of motion and neck supple.   Neurological:      Mental Status: He is alert and oriented to person, place, and time.   Psychiatric:         Mood and Affect: Mood normal.         Behavior: Behavior normal.        Result Review :   The following data was reviewed by: Adolfo Adame MD on 11/03/2021:  Common labs    Common Labsle " 2/24/21 2/24/21 2/25/21 2/25/21 2/25/21 2/25/21 10/27/21 10/27/21    1150 1150 0810 0811 0811 0811 1259 1259   Glucose  116 (A)    105 (A) 103 (A)    BUN  14    14 12    Creatinine  0.88    0.77 0.95    eGFR Non  Am  82    96 72    eGFR African Am       83    Sodium  139    141 140    Potassium  3.9    4.4 4.7    Chloride  105    107 103    Calcium  9.5    9.1 9.7    Total Protein       6.8    Albumin  3.80    3.70 4.3    Total Bilirubin  0.5    0.7 0.9    Alkaline Phosphatase  52    45 51    AST (SGOT)  17    13 21    ALT (SGPT)  17    19 21    WBC 8.68   8.30       Hemoglobin 15.4   15.0       Hematocrit 44.9   44.0       Platelets 248   243       Total Cholesterol     157      Total Cholesterol        138   Triglycerides     125   110   HDL Cholesterol     31 (A)   44   LDL Cholesterol      103 (A)   74   Hemoglobin A1C   5.70 (A)        (A) Abnormal value       Comments are available for some flowsheets but are not being displayed.                     Assessment and Plan    Diagnoses and all orders for this visit:    1. Benign essential hypertension (Primary)    2. Dyslipidemia    3. Basilar artery occlusion with cerebral infarction (HCC)    4. Pancreatic mass  -     MRI abdomen w contrast mrcp; Future      Hypertension.  Well-controlled.  Continue metoprolol.    Hyperlipidemia.  Continue atorvastatin.    Basilar artery occlusion with pontine infarct, his symptoms have resolved.  He continues on dual antiplatelet therapy.    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  Asymptomatic.  No evidence of obstructive on exam or metabolically on lab work.  I do recommend repeat of the MRI.  This has been ordered.    I will see him back in 6 months.  Sooner as needed.  He will need his Medicare wellness visit then.      Follow Up   No follow-ups on file.  Patient was given instructions and counseling regarding his condition or for health maintenance advice. Please see specific information pulled  into the AVS if appropriate.

## 2021-11-03 NOTE — PATIENT INSTRUCTIONS
4 years ago a MRI at [facility] demonstrated some pancreas cyst, probable benign neoplasms.  But they do need a follow-up MRI.  This has been ordered.  Somebody will call you.    In the next week or 2, please get to the [facility] radiology department and ask for copies of the films/on a disc of the MRI.  Bring them to your [facility] appointment.

## 2021-12-05 ENCOUNTER — HOSPITAL ENCOUNTER (OUTPATIENT)
Dept: MRI IMAGING | Facility: HOSPITAL | Age: 86
Discharge: HOME | End: 2021-12-05

## 2021-12-05 ENCOUNTER — APPOINTMENT (OUTPATIENT)
Dept: OTHER | Facility: HOSPITAL | Age: 86
End: 2021-12-05

## 2021-12-05 DIAGNOSIS — K86.89 PANCREATIC MASS: ICD-10-CM

## 2021-12-05 DIAGNOSIS — Z09 FOLLOW UP: ICD-10-CM

## 2021-12-05 LAB — CREAT BLDA-MCNC: 1 MG/DL (ref 0.6–1.3)

## 2021-12-05 PROCEDURE — 74183 MRI ABD W/O CNTR FLWD CNTR: CPT

## 2021-12-05 PROCEDURE — 0 GADOBENATE DIMEGLUMINE 529 MG/ML SOLUTION: Performed by: FAMILY MEDICINE

## 2021-12-05 PROCEDURE — A9577 INJ MULTIHANCE: HCPCS | Performed by: FAMILY MEDICINE

## 2021-12-05 PROCEDURE — 82565 ASSAY OF CREATININE: CPT

## 2021-12-05 RX ADMIN — GADOBENATE DIMEGLUMINE 16 ML: 529 INJECTION, SOLUTION INTRAVENOUS at 09:36

## 2022-01-01 ENCOUNTER — APPOINTMENT (OUTPATIENT)
Dept: GENERAL RADIOLOGY | Facility: HOSPITAL | Age: 87
DRG: 25 | End: 2022-01-01
Payer: MEDICARE

## 2022-01-01 ENCOUNTER — APPOINTMENT (OUTPATIENT)
Dept: CARDIOLOGY | Facility: HOSPITAL | Age: 87
DRG: 25 | End: 2022-01-01
Payer: MEDICARE

## 2022-01-01 ENCOUNTER — OFFICE VISIT (OUTPATIENT)
Dept: FAMILY MEDICINE CLINIC | Facility: CLINIC | Age: 87
End: 2022-01-01

## 2022-01-01 ENCOUNTER — ANESTHESIA EVENT (OUTPATIENT)
Dept: PERIOP | Facility: HOSPITAL | Age: 87
DRG: 25 | End: 2022-01-01
Payer: MEDICARE

## 2022-01-01 ENCOUNTER — ANESTHESIA (OUTPATIENT)
Dept: PERIOP | Facility: HOSPITAL | Age: 87
DRG: 25 | End: 2022-01-01
Payer: MEDICARE

## 2022-01-01 ENCOUNTER — APPOINTMENT (OUTPATIENT)
Dept: CT IMAGING | Facility: HOSPITAL | Age: 87
DRG: 25 | End: 2022-01-01
Payer: MEDICARE

## 2022-01-01 ENCOUNTER — TELEPHONE (OUTPATIENT)
Dept: FAMILY MEDICINE CLINIC | Facility: CLINIC | Age: 87
End: 2022-01-01

## 2022-01-01 ENCOUNTER — ANESTHESIA EVENT (OUTPATIENT)
Dept: GASTROENTEROLOGY | Facility: HOSPITAL | Age: 87
DRG: 25 | End: 2022-01-01
Payer: MEDICARE

## 2022-01-01 ENCOUNTER — ANESTHESIA (OUTPATIENT)
Dept: GASTROENTEROLOGY | Facility: HOSPITAL | Age: 87
DRG: 25 | End: 2022-01-01
Payer: MEDICARE

## 2022-01-01 ENCOUNTER — OFFICE VISIT (OUTPATIENT)
Dept: NEUROLOGY | Facility: CLINIC | Age: 87
End: 2022-01-01

## 2022-01-01 ENCOUNTER — HOSPITAL ENCOUNTER (INPATIENT)
Facility: HOSPITAL | Age: 87
LOS: 12 days | DRG: 25 | End: 2023-01-01
Attending: EMERGENCY MEDICINE | Admitting: HOSPITALIST
Payer: MEDICARE

## 2022-01-01 VITALS
HEIGHT: 72 IN | OXYGEN SATURATION: 97 % | SYSTOLIC BLOOD PRESSURE: 127 MMHG | HEART RATE: 77 BPM | DIASTOLIC BLOOD PRESSURE: 75 MMHG | BODY MASS INDEX: 23.46 KG/M2 | TEMPERATURE: 96.8 F

## 2022-01-01 VITALS
BODY MASS INDEX: 24.11 KG/M2 | TEMPERATURE: 97.5 F | DIASTOLIC BLOOD PRESSURE: 80 MMHG | HEART RATE: 60 BPM | SYSTOLIC BLOOD PRESSURE: 120 MMHG | WEIGHT: 178 LBS | HEIGHT: 72 IN | OXYGEN SATURATION: 98 %

## 2022-01-01 VITALS
HEART RATE: 74 BPM | OXYGEN SATURATION: 98 % | SYSTOLIC BLOOD PRESSURE: 110 MMHG | BODY MASS INDEX: 23.43 KG/M2 | DIASTOLIC BLOOD PRESSURE: 70 MMHG | HEIGHT: 72 IN | WEIGHT: 173 LBS

## 2022-01-01 VITALS
BODY MASS INDEX: 23.45 KG/M2 | HEART RATE: 69 BPM | OXYGEN SATURATION: 97 % | SYSTOLIC BLOOD PRESSURE: 126 MMHG | DIASTOLIC BLOOD PRESSURE: 83 MMHG | WEIGHT: 173.1 LBS | TEMPERATURE: 96.6 F | HEIGHT: 72 IN

## 2022-01-01 VITALS
SYSTOLIC BLOOD PRESSURE: 137 MMHG | HEART RATE: 68 BPM | TEMPERATURE: 97.1 F | BODY MASS INDEX: 24.22 KG/M2 | DIASTOLIC BLOOD PRESSURE: 73 MMHG | OXYGEN SATURATION: 98 % | HEIGHT: 72 IN | WEIGHT: 178.8 LBS

## 2022-01-01 DIAGNOSIS — R05.9 COUGH: Primary | ICD-10-CM

## 2022-01-01 DIAGNOSIS — I63.22: Chronic | ICD-10-CM

## 2022-01-01 DIAGNOSIS — I47.1 PAROXYSMAL SVT (SUPRAVENTRICULAR TACHYCARDIA): Chronic | ICD-10-CM

## 2022-01-01 DIAGNOSIS — Z00.00 MEDICARE ANNUAL WELLNESS VISIT, SUBSEQUENT: Primary | ICD-10-CM

## 2022-01-01 DIAGNOSIS — K86.89 PANCREATIC MASS: Chronic | ICD-10-CM

## 2022-01-01 DIAGNOSIS — Z01.818 PREOPERATIVE EVALUATION TO RULE OUT SURGICAL CONTRAINDICATION: Primary | ICD-10-CM

## 2022-01-01 DIAGNOSIS — E78.5 DYSLIPIDEMIA: Chronic | ICD-10-CM

## 2022-01-01 DIAGNOSIS — I10 BENIGN ESSENTIAL HYPERTENSION: Primary | Chronic | ICD-10-CM

## 2022-01-01 DIAGNOSIS — I10 BENIGN ESSENTIAL HYPERTENSION: ICD-10-CM

## 2022-01-01 DIAGNOSIS — S06.5XAA SUBDURAL HEMATOMA: Primary | ICD-10-CM

## 2022-01-01 DIAGNOSIS — I10 BENIGN ESSENTIAL HYPERTENSION: Chronic | ICD-10-CM

## 2022-01-01 DIAGNOSIS — I10 BENIGN ESSENTIAL HYPERTENSION: Primary | ICD-10-CM

## 2022-01-01 DIAGNOSIS — R73.01 IMPAIRED FASTING GLUCOSE: ICD-10-CM

## 2022-01-01 DIAGNOSIS — R13.12 OROPHARYNGEAL DYSPHAGIA: ICD-10-CM

## 2022-01-01 DIAGNOSIS — Z86.73 HISTORY OF CVA (CEREBROVASCULAR ACCIDENT): Primary | ICD-10-CM

## 2022-01-01 DIAGNOSIS — J30.9 ALLERGIC RHINITIS, UNSPECIFIED SEASONALITY, UNSPECIFIED TRIGGER: ICD-10-CM

## 2022-01-01 LAB
ABO GROUP BLD: NORMAL
ALBUMIN SERPL-MCNC: 4.1 G/DL (ref 3.6–4.6)
ALBUMIN SERPL-MCNC: 4.3 G/DL (ref 3.5–5.2)
ALBUMIN/GLOB SERPL: 1.9 {RATIO} (ref 1.2–2.2)
ALBUMIN/GLOB SERPL: 2.5 G/DL
ALP SERPL-CCNC: 58 U/L (ref 39–117)
ALP SERPL-CCNC: 60 IU/L (ref 44–121)
ALT SERPL W P-5'-P-CCNC: 41 U/L (ref 1–41)
ALT SERPL-CCNC: 28 IU/L (ref 0–44)
ANION GAP SERPL CALCULATED.3IONS-SCNC: 11.4 MMOL/L (ref 5–15)
ANION GAP SERPL CALCULATED.3IONS-SCNC: 11.7 MMOL/L (ref 5–15)
ANION GAP SERPL CALCULATED.3IONS-SCNC: 13.2 MMOL/L (ref 5–15)
ANION GAP SERPL CALCULATED.3IONS-SCNC: 15.9 MMOL/L (ref 5–15)
ANION GAP SERPL CALCULATED.3IONS-SCNC: 6.6 MMOL/L (ref 5–15)
ANION GAP SERPL CALCULATED.3IONS-SCNC: 6.8 MMOL/L (ref 5–15)
ANION GAP SERPL CALCULATED.3IONS-SCNC: 7 MMOL/L (ref 5–15)
ANION GAP SERPL CALCULATED.3IONS-SCNC: 7 MMOL/L (ref 5–15)
ANION GAP SERPL CALCULATED.3IONS-SCNC: 9 MMOL/L (ref 5–15)
ANION GAP SERPL CALCULATED.3IONS-SCNC: 9 MMOL/L (ref 5–15)
AORTIC DIMENSIONLESS INDEX: 0.8 (DI)
APTT PPP: 23.2 SECONDS (ref 22.7–35.4)
APTT PPP: 26.2 SECONDS (ref 22.7–35.4)
ARTERIAL PATENCY WRIST A: POSITIVE
ARTERIAL PATENCY WRIST A: POSITIVE
ASCENDING AORTA: 3.4 CM
AST SERPL-CCNC: 28 IU/L (ref 0–40)
AST SERPL-CCNC: 43 U/L (ref 1–40)
ATMOSPHERIC PRESS: 747 MMHG
ATMOSPHERIC PRESS: 753.5 MMHG
BASE EXCESS BLDA CALC-SCNC: -0.5 MMOL/L (ref 0–2)
BASE EXCESS BLDA CALC-SCNC: -1.4 MMOL/L (ref 0–2)
BASOPHILS # BLD AUTO: 0.05 10*3/MM3 (ref 0–0.2)
BASOPHILS # BLD AUTO: 0.05 10*3/MM3 (ref 0–0.2)
BASOPHILS # BLD AUTO: 0.06 10*3/MM3 (ref 0–0.2)
BASOPHILS # BLD AUTO: 0.06 10*3/MM3 (ref 0–0.2)
BASOPHILS # BLD AUTO: 0.07 10*3/MM3 (ref 0–0.2)
BASOPHILS # BLD AUTO: 0.12 10*3/MM3 (ref 0–0.2)
BASOPHILS # BLD AUTO: 0.12 10*3/MM3 (ref 0–0.2)
BASOPHILS NFR BLD AUTO: 0.3 % (ref 0–1.5)
BASOPHILS NFR BLD AUTO: 0.3 % (ref 0–1.5)
BASOPHILS NFR BLD AUTO: 0.4 % (ref 0–1.5)
BASOPHILS NFR BLD AUTO: 0.5 % (ref 0–1.5)
BASOPHILS NFR BLD AUTO: 0.6 % (ref 0–1.5)
BASOPHILS NFR BLD AUTO: 0.8 % (ref 0–1.5)
BASOPHILS NFR BLD AUTO: 0.9 % (ref 0–1.5)
BDY SITE: ABNORMAL
BDY SITE: ABNORMAL
BH BB BLOOD EXPIRATION DATE: NORMAL
BH BB BLOOD TYPE BARCODE: 6200
BH BB DISPENSE STATUS: NORMAL
BH BB PRODUCT CODE: NORMAL
BH BB UNIT NUMBER: NORMAL
BH CV ECHO MEAS - ACS: 1.4 CM
BH CV ECHO MEAS - AO MAX PG: 10.3 MMHG
BH CV ECHO MEAS - AO MEAN PG: 5.6 MMHG
BH CV ECHO MEAS - AO ROOT DIAM: 2.7 CM
BH CV ECHO MEAS - AO V2 MAX: 160.2 CM/SEC
BH CV ECHO MEAS - AO V2 VTI: 20 CM
BH CV ECHO MEAS - AVA(I,D): 2.5 CM2
BH CV ECHO MEAS - EDV(CUBED): 42 ML
BH CV ECHO MEAS - EDV(MOD-SP2): 83 ML
BH CV ECHO MEAS - EDV(MOD-SP4): 86 ML
BH CV ECHO MEAS - EF(MOD-BP): 73 %
BH CV ECHO MEAS - EF(MOD-SP2): 73.5 %
BH CV ECHO MEAS - EF(MOD-SP4): 70.9 %
BH CV ECHO MEAS - ESV(CUBED): 23.3 ML
BH CV ECHO MEAS - ESV(MOD-SP2): 22 ML
BH CV ECHO MEAS - ESV(MOD-SP4): 25 ML
BH CV ECHO MEAS - FS: 17.8 %
BH CV ECHO MEAS - IVS/LVPW: 0.95 CM
BH CV ECHO MEAS - IVSD: 0.87 CM
BH CV ECHO MEAS - LAT PEAK E' VEL: 18.3 CM/SEC
BH CV ECHO MEAS - LV MASS(C)D: 87 GRAMS
BH CV ECHO MEAS - LV MAX PG: 6 MMHG
BH CV ECHO MEAS - LV MEAN PG: 2.6 MMHG
BH CV ECHO MEAS - LV V1 MAX: 122.4 CM/SEC
BH CV ECHO MEAS - LV V1 VTI: 15.5 CM
BH CV ECHO MEAS - LVIDD: 3.5 CM
BH CV ECHO MEAS - LVIDS: 2.9 CM
BH CV ECHO MEAS - LVOT AREA: 3.3 CM2
BH CV ECHO MEAS - LVOT DIAM: 2.04 CM
BH CV ECHO MEAS - LVPWD: 0.92 CM
BH CV ECHO MEAS - MED PEAK E' VEL: 11.2 CM/SEC
BH CV ECHO MEAS - MR MAX PG: 30.3 MMHG
BH CV ECHO MEAS - MR MAX VEL: 275.1 CM/SEC
BH CV ECHO MEAS - MV DEC SLOPE: 671.9 CM/SEC2
BH CV ECHO MEAS - MV DEC TIME: 0.18 MSEC
BH CV ECHO MEAS - MV E MAX VEL: 75.8 CM/SEC
BH CV ECHO MEAS - MV MAX PG: 5.7 MMHG
BH CV ECHO MEAS - MV MEAN PG: 1.7 MMHG
BH CV ECHO MEAS - MV P1/2T: 53.1 MSEC
BH CV ECHO MEAS - MV V2 VTI: 17.1 CM
BH CV ECHO MEAS - MVA(P1/2T): 4.1 CM2
BH CV ECHO MEAS - MVA(VTI): 3 CM2
BH CV ECHO MEAS - PA ACC TIME: 0.08 SEC
BH CV ECHO MEAS - PA PR(ACCEL): 41 MMHG
BH CV ECHO MEAS - PA V2 MAX: 96.5 CM/SEC
BH CV ECHO MEAS - QP/QS: 0.87
BH CV ECHO MEAS - RV MAX PG: 4 MMHG
BH CV ECHO MEAS - RV V1 MAX: 100.3 CM/SEC
BH CV ECHO MEAS - RV V1 VTI: 13.6 CM
BH CV ECHO MEAS - RVOT DIAM: 2.03 CM
BH CV ECHO MEAS - RVSP: 39 MMHG
BH CV ECHO MEAS - SV(LVOT): 50.7 ML
BH CV ECHO MEAS - SV(MOD-SP2): 61 ML
BH CV ECHO MEAS - SV(MOD-SP4): 61 ML
BH CV ECHO MEAS - SV(RVOT): 44 ML
BH CV ECHO MEAS - TAPSE (>1.6): 2.6 CM
BH CV ECHO MEAS - TR MAX PG: 31 MMHG
BH CV ECHO MEAS - TR MAX VEL: 277.5 CM/SEC
BH CV ECHO MEASUREMENTS AVERAGE E/E' RATIO: 5.14
BH CV LOWER VASCULAR LEFT COMMON FEMORAL AUGMENT: NORMAL
BH CV LOWER VASCULAR LEFT COMMON FEMORAL COMPETENT: NORMAL
BH CV LOWER VASCULAR LEFT COMMON FEMORAL COMPRESS: NORMAL
BH CV LOWER VASCULAR LEFT COMMON FEMORAL PHASIC: NORMAL
BH CV LOWER VASCULAR LEFT COMMON FEMORAL SPONT: NORMAL
BH CV LOWER VASCULAR LEFT DISTAL FEMORAL COMPRESS: NORMAL
BH CV LOWER VASCULAR LEFT GASTRONEMIUS COMPRESS: NORMAL
BH CV LOWER VASCULAR LEFT GREATER SAPH AK COMPRESS: NORMAL
BH CV LOWER VASCULAR LEFT GREATER SAPH BK COMPRESS: NORMAL
BH CV LOWER VASCULAR LEFT MID FEMORAL AUGMENT: NORMAL
BH CV LOWER VASCULAR LEFT MID FEMORAL COMPETENT: NORMAL
BH CV LOWER VASCULAR LEFT MID FEMORAL COMPRESS: NORMAL
BH CV LOWER VASCULAR LEFT MID FEMORAL PHASIC: NORMAL
BH CV LOWER VASCULAR LEFT MID FEMORAL SPONT: NORMAL
BH CV LOWER VASCULAR LEFT PERONEAL COMPRESS: NORMAL
BH CV LOWER VASCULAR LEFT POPLITEAL AUGMENT: NORMAL
BH CV LOWER VASCULAR LEFT POPLITEAL COMPETENT: NORMAL
BH CV LOWER VASCULAR LEFT POPLITEAL COMPRESS: NORMAL
BH CV LOWER VASCULAR LEFT POPLITEAL PHASIC: NORMAL
BH CV LOWER VASCULAR LEFT POPLITEAL SPONT: NORMAL
BH CV LOWER VASCULAR LEFT POSTERIOR TIBIAL COMPRESS: NORMAL
BH CV LOWER VASCULAR LEFT PROFUNDA FEMORAL COMPRESS: NORMAL
BH CV LOWER VASCULAR LEFT PROXIMAL FEMORAL COMPRESS: NORMAL
BH CV LOWER VASCULAR LEFT SAPHENOFEMORAL JUNCTION COMPRESS: NORMAL
BH CV LOWER VASCULAR RIGHT COMMON FEMORAL AUGMENT: NORMAL
BH CV LOWER VASCULAR RIGHT COMMON FEMORAL COMPETENT: NORMAL
BH CV LOWER VASCULAR RIGHT COMMON FEMORAL COMPRESS: NORMAL
BH CV LOWER VASCULAR RIGHT COMMON FEMORAL PHASIC: NORMAL
BH CV LOWER VASCULAR RIGHT COMMON FEMORAL SPONT: NORMAL
BH CV LOWER VASCULAR RIGHT DISTAL FEMORAL COMPRESS: NORMAL
BH CV LOWER VASCULAR RIGHT GASTRONEMIUS COMPRESS: NORMAL
BH CV LOWER VASCULAR RIGHT GREATER SAPH AK COMPRESS: NORMAL
BH CV LOWER VASCULAR RIGHT GREATER SAPH BK COMPRESS: NORMAL
BH CV LOWER VASCULAR RIGHT LESSER SAPH COMPRESS: NORMAL
BH CV LOWER VASCULAR RIGHT MID FEMORAL AUGMENT: NORMAL
BH CV LOWER VASCULAR RIGHT MID FEMORAL COMPETENT: NORMAL
BH CV LOWER VASCULAR RIGHT MID FEMORAL COMPRESS: NORMAL
BH CV LOWER VASCULAR RIGHT MID FEMORAL PHASIC: NORMAL
BH CV LOWER VASCULAR RIGHT MID FEMORAL SPONT: NORMAL
BH CV LOWER VASCULAR RIGHT PERONEAL COMPRESS: NORMAL
BH CV LOWER VASCULAR RIGHT POPLITEAL AUGMENT: NORMAL
BH CV LOWER VASCULAR RIGHT POPLITEAL COMPETENT: NORMAL
BH CV LOWER VASCULAR RIGHT POPLITEAL COMPRESS: NORMAL
BH CV LOWER VASCULAR RIGHT POPLITEAL PHASIC: NORMAL
BH CV LOWER VASCULAR RIGHT POPLITEAL SPONT: NORMAL
BH CV LOWER VASCULAR RIGHT POSTERIOR TIBIAL COMPRESS: NORMAL
BH CV LOWER VASCULAR RIGHT PROFUNDA FEMORAL COMPRESS: NORMAL
BH CV LOWER VASCULAR RIGHT PROXIMAL FEMORAL COMPRESS: NORMAL
BH CV LOWER VASCULAR RIGHT SAPHENOFEMORAL JUNCTION COMPRESS: NORMAL
BH CV XLRA - RV BASE: 3.7 CM
BH CV XLRA - RV LENGTH: 7.2 CM
BH CV XLRA - RV MID: 3.5 CM
BH CV XLRA - TDI S': 24.4 CM/SEC
BILIRUB SERPL-MCNC: 0.4 MG/DL (ref 0–1.2)
BILIRUB SERPL-MCNC: 0.8 MG/DL (ref 0–1.2)
BILIRUB UR QL STRIP: NEGATIVE
BLD GP AB SCN SERPL QL: NEGATIVE
BUN SERPL-MCNC: 13 MG/DL (ref 8–23)
BUN SERPL-MCNC: 19 MG/DL (ref 8–23)
BUN SERPL-MCNC: 21 MG/DL (ref 8–27)
BUN SERPL-MCNC: 23 MG/DL (ref 8–23)
BUN SERPL-MCNC: 23 MG/DL (ref 8–23)
BUN SERPL-MCNC: 26 MG/DL (ref 8–23)
BUN SERPL-MCNC: 27 MG/DL (ref 8–23)
BUN SERPL-MCNC: 28 MG/DL (ref 8–23)
BUN SERPL-MCNC: 30 MG/DL (ref 8–23)
BUN SERPL-MCNC: 34 MG/DL (ref 8–23)
BUN SERPL-MCNC: 47 MG/DL (ref 8–23)
BUN/CREAT SERPL: 14.8 (ref 7–25)
BUN/CREAT SERPL: 19.6 (ref 7–25)
BUN/CREAT SERPL: 24 (ref 10–24)
BUN/CREAT SERPL: 34.3 (ref 7–25)
BUN/CREAT SERPL: 36.6 (ref 7–25)
BUN/CREAT SERPL: 39.5 (ref 7–25)
BUN/CREAT SERPL: 40.4 (ref 7–25)
BUN/CREAT SERPL: 40.9 (ref 7–25)
BUN/CREAT SERPL: 43 (ref 7–25)
BUN/CREAT SERPL: 45.6 (ref 7–25)
BUN/CREAT SERPL: 45.9 (ref 7–25)
CALCIUM SERPL-MCNC: 9.7 MG/DL (ref 8.6–10.2)
CALCIUM SPEC-SCNC: 8.8 MG/DL (ref 8.6–10.5)
CALCIUM SPEC-SCNC: 8.9 MG/DL (ref 8.6–10.5)
CALCIUM SPEC-SCNC: 8.9 MG/DL (ref 8.6–10.5)
CALCIUM SPEC-SCNC: 9 MG/DL (ref 8.6–10.5)
CALCIUM SPEC-SCNC: 9.1 MG/DL (ref 8.6–10.5)
CALCIUM SPEC-SCNC: 9.2 MG/DL (ref 8.6–10.5)
CALCIUM SPEC-SCNC: 9.4 MG/DL (ref 8.6–10.5)
CALCIUM SPEC-SCNC: 9.4 MG/DL (ref 8.6–10.5)
CALCIUM SPEC-SCNC: 9.6 MG/DL (ref 8.6–10.5)
CALCIUM SPEC-SCNC: 9.7 MG/DL (ref 8.6–10.5)
CHLORIDE SERPL-SCNC: 104 MMOL/L (ref 98–107)
CHLORIDE SERPL-SCNC: 104 MMOL/L (ref 98–107)
CHLORIDE SERPL-SCNC: 107 MMOL/L (ref 98–107)
CHLORIDE SERPL-SCNC: 109 MMOL/L (ref 98–107)
CHLORIDE SERPL-SCNC: 110 MMOL/L (ref 96–106)
CHLORIDE SERPL-SCNC: 111 MMOL/L (ref 98–107)
CHLORIDE SERPL-SCNC: 112 MMOL/L (ref 98–107)
CHLORIDE SERPL-SCNC: 112 MMOL/L (ref 98–107)
CHLORIDE SERPL-SCNC: 114 MMOL/L (ref 98–107)
CHLORIDE SERPL-SCNC: 115 MMOL/L (ref 98–107)
CHLORIDE SERPL-SCNC: 116 MMOL/L (ref 98–107)
CHOLEST SERPL-MCNC: 103 MG/DL (ref 100–199)
CHOLEST SERPL-MCNC: 121 MG/DL (ref 0–200)
CLARITY UR: ABNORMAL
CO2 SERPL-SCNC: 19 MMOL/L (ref 22–29)
CO2 SERPL-SCNC: 19.1 MMOL/L (ref 22–29)
CO2 SERPL-SCNC: 19.8 MMOL/L (ref 22–29)
CO2 SERPL-SCNC: 21.2 MMOL/L (ref 22–29)
CO2 SERPL-SCNC: 22.3 MMOL/L (ref 22–29)
CO2 SERPL-SCNC: 23 MMOL/L (ref 22–29)
CO2 SERPL-SCNC: 23.4 MMOL/L (ref 22–29)
CO2 SERPL-SCNC: 24 MMOL/L (ref 20–29)
CO2 SERPL-SCNC: 24 MMOL/L (ref 22–29)
CO2 SERPL-SCNC: 24.6 MMOL/L (ref 22–29)
CO2 SERPL-SCNC: 26 MMOL/L (ref 22–29)
COLOR UR: YELLOW
CREAT BLDA-MCNC: 0.9 MG/DL (ref 0.6–1.3)
CREAT SERPL-MCNC: 0.57 MG/DL (ref 0.76–1.27)
CREAT SERPL-MCNC: 0.61 MG/DL (ref 0.76–1.27)
CREAT SERPL-MCNC: 0.66 MG/DL (ref 0.76–1.27)
CREAT SERPL-MCNC: 0.67 MG/DL (ref 0.76–1.27)
CREAT SERPL-MCNC: 0.71 MG/DL (ref 0.76–1.27)
CREAT SERPL-MCNC: 0.76 MG/DL (ref 0.76–1.27)
CREAT SERPL-MCNC: 0.79 MG/DL (ref 0.76–1.27)
CREAT SERPL-MCNC: 0.88 MG/DL (ref 0.76–1.27)
CREAT SERPL-MCNC: 0.88 MG/DL (ref 0.76–1.27)
CREAT SERPL-MCNC: 0.97 MG/DL (ref 0.76–1.27)
CREAT SERPL-MCNC: 1.03 MG/DL (ref 0.76–1.27)
D-LACTATE SERPL-SCNC: 1.1 MMOL/L (ref 0.5–2)
DEPRECATED RDW RBC AUTO: 37 FL (ref 37–54)
DEPRECATED RDW RBC AUTO: 37.4 FL (ref 37–54)
DEPRECATED RDW RBC AUTO: 38.5 FL (ref 37–54)
DEPRECATED RDW RBC AUTO: 38.6 FL (ref 37–54)
DEPRECATED RDW RBC AUTO: 39 FL (ref 37–54)
DEPRECATED RDW RBC AUTO: 39.4 FL (ref 37–54)
DEPRECATED RDW RBC AUTO: 41.4 FL (ref 37–54)
DEPRECATED RDW RBC AUTO: 41.9 FL (ref 37–54)
DEPRECATED RDW RBC AUTO: 42.5 FL (ref 37–54)
DEPRECATED RDW RBC AUTO: 43.1 FL (ref 37–54)
DIGOXIN SERPL-MCNC: 0.7 NG/ML (ref 0.6–1.2)
DIGOXIN SERPL-MCNC: 1.4 NG/ML (ref 0.6–1.2)
DIGOXIN SERPL-MCNC: 1.4 NG/ML (ref 0.6–1.2)
EGFRCR SERPLBLD CKD-EPI 2021: 69.9 ML/MIN/1.73
EGFRCR SERPLBLD CKD-EPI 2021: 75.1 ML/MIN/1.73
EGFRCR SERPLBLD CKD-EPI 2021: 82.7 ML/MIN/1.73
EGFRCR SERPLBLD CKD-EPI 2021: 83 ML/MIN/1.73
EGFRCR SERPLBLD CKD-EPI 2021: 85.4 ML/MIN/1.73
EGFRCR SERPLBLD CKD-EPI 2021: 86.5 ML/MIN/1.73
EGFRCR SERPLBLD CKD-EPI 2021: 88.2 ML/MIN/1.73
EGFRCR SERPLBLD CKD-EPI 2021: 89.8 ML/MIN/1.73
EGFRCR SERPLBLD CKD-EPI 2021: 90.2 ML/MIN/1.73
EGFRCR SERPLBLD CKD-EPI 2021: 92.4 ML/MIN/1.73
EGFRCR SERPLBLD CKD-EPI 2021: 94.3 ML/MIN/1.73
EOSINOPHIL # BLD AUTO: 0 10*3/MM3 (ref 0–0.4)
EOSINOPHIL # BLD AUTO: 0.01 10*3/MM3 (ref 0–0.4)
EOSINOPHIL # BLD AUTO: 0.01 10*3/MM3 (ref 0–0.4)
EOSINOPHIL # BLD AUTO: 0.02 10*3/MM3 (ref 0–0.4)
EOSINOPHIL # BLD AUTO: 0.05 10*3/MM3 (ref 0–0.4)
EOSINOPHIL # BLD AUTO: 0.2 10*3/MM3 (ref 0–0.4)
EOSINOPHIL # BLD AUTO: 0.48 10*3/MM3 (ref 0–0.4)
EOSINOPHIL NFR BLD AUTO: 0 % (ref 0.3–6.2)
EOSINOPHIL NFR BLD AUTO: 0.1 % (ref 0.3–6.2)
EOSINOPHIL NFR BLD AUTO: 0.4 % (ref 0.3–6.2)
EOSINOPHIL NFR BLD AUTO: 1.7 % (ref 0.3–6.2)
EOSINOPHIL NFR BLD AUTO: 3.4 % (ref 0.3–6.2)
ERYTHROCYTE [DISTWIDTH] IN BLOOD BY AUTOMATED COUNT: 11.3 % (ref 12.3–15.4)
ERYTHROCYTE [DISTWIDTH] IN BLOOD BY AUTOMATED COUNT: 11.7 % (ref 12.3–15.4)
ERYTHROCYTE [DISTWIDTH] IN BLOOD BY AUTOMATED COUNT: 11.8 % (ref 12.3–15.4)
ERYTHROCYTE [DISTWIDTH] IN BLOOD BY AUTOMATED COUNT: 11.9 % (ref 12.3–15.4)
ERYTHROCYTE [DISTWIDTH] IN BLOOD BY AUTOMATED COUNT: 12 % (ref 12.3–15.4)
ERYTHROCYTE [DISTWIDTH] IN BLOOD BY AUTOMATED COUNT: 12 % (ref 12.3–15.4)
ERYTHROCYTE [DISTWIDTH] IN BLOOD BY AUTOMATED COUNT: 12.1 % (ref 12.3–15.4)
ERYTHROCYTE [DISTWIDTH] IN BLOOD BY AUTOMATED COUNT: 12.2 % (ref 12.3–15.4)
ERYTHROCYTE [DISTWIDTH] IN BLOOD BY AUTOMATED COUNT: 12.2 % (ref 12.3–15.4)
ERYTHROCYTE [DISTWIDTH] IN BLOOD BY AUTOMATED COUNT: 12.3 % (ref 12.3–15.4)
EXPIRATION DATE: NORMAL
GAS FLOW AIRWAY: 3 LPM
GLOBULIN SER CALC-MCNC: 2.2 G/DL (ref 1.5–4.5)
GLOBULIN UR ELPH-MCNC: 1.7 GM/DL
GLUCOSE BLDC GLUCOMTR-MCNC: 111 MG/DL (ref 70–130)
GLUCOSE BLDC GLUCOMTR-MCNC: 112 MG/DL (ref 70–130)
GLUCOSE BLDC GLUCOMTR-MCNC: 116 MG/DL (ref 70–130)
GLUCOSE BLDC GLUCOMTR-MCNC: 121 MG/DL (ref 70–130)
GLUCOSE BLDC GLUCOMTR-MCNC: 122 MG/DL (ref 70–130)
GLUCOSE BLDC GLUCOMTR-MCNC: 124 MG/DL (ref 70–130)
GLUCOSE BLDC GLUCOMTR-MCNC: 128 MG/DL (ref 70–130)
GLUCOSE BLDC GLUCOMTR-MCNC: 128 MG/DL (ref 70–130)
GLUCOSE BLDC GLUCOMTR-MCNC: 129 MG/DL (ref 70–130)
GLUCOSE BLDC GLUCOMTR-MCNC: 132 MG/DL (ref 70–130)
GLUCOSE BLDC GLUCOMTR-MCNC: 136 MG/DL (ref 70–130)
GLUCOSE BLDC GLUCOMTR-MCNC: 136 MG/DL (ref 70–130)
GLUCOSE BLDC GLUCOMTR-MCNC: 137 MG/DL (ref 70–130)
GLUCOSE BLDC GLUCOMTR-MCNC: 139 MG/DL (ref 70–130)
GLUCOSE BLDC GLUCOMTR-MCNC: 139 MG/DL (ref 70–130)
GLUCOSE BLDC GLUCOMTR-MCNC: 140 MG/DL (ref 70–130)
GLUCOSE BLDC GLUCOMTR-MCNC: 144 MG/DL (ref 70–130)
GLUCOSE BLDC GLUCOMTR-MCNC: 148 MG/DL (ref 70–130)
GLUCOSE BLDC GLUCOMTR-MCNC: 151 MG/DL (ref 70–130)
GLUCOSE BLDC GLUCOMTR-MCNC: 152 MG/DL (ref 70–130)
GLUCOSE BLDC GLUCOMTR-MCNC: 154 MG/DL (ref 70–130)
GLUCOSE BLDC GLUCOMTR-MCNC: 155 MG/DL (ref 70–130)
GLUCOSE BLDC GLUCOMTR-MCNC: 159 MG/DL (ref 70–130)
GLUCOSE BLDC GLUCOMTR-MCNC: 159 MG/DL (ref 70–130)
GLUCOSE BLDC GLUCOMTR-MCNC: 161 MG/DL (ref 70–130)
GLUCOSE BLDC GLUCOMTR-MCNC: 162 MG/DL (ref 70–130)
GLUCOSE SERPL-MCNC: 114 MG/DL (ref 65–99)
GLUCOSE SERPL-MCNC: 134 MG/DL (ref 65–99)
GLUCOSE SERPL-MCNC: 135 MG/DL (ref 65–99)
GLUCOSE SERPL-MCNC: 136 MG/DL (ref 65–99)
GLUCOSE SERPL-MCNC: 140 MG/DL (ref 65–99)
GLUCOSE SERPL-MCNC: 142 MG/DL (ref 65–99)
GLUCOSE SERPL-MCNC: 150 MG/DL (ref 65–99)
GLUCOSE SERPL-MCNC: 156 MG/DL (ref 65–99)
GLUCOSE SERPL-MCNC: 164 MG/DL (ref 65–99)
GLUCOSE SERPL-MCNC: 167 MG/DL (ref 65–99)
GLUCOSE SERPL-MCNC: 171 MG/DL (ref 65–99)
GLUCOSE UR STRIP-MCNC: NEGATIVE MG/DL
HBA1C MFR BLD: 5.8 % (ref 4.8–5.6)
HBA1C MFR BLD: 6 % (ref 4.8–5.6)
HCO3 BLDA-SCNC: 20.5 MMOL/L (ref 22–28)
HCO3 BLDA-SCNC: 25.9 MMOL/L (ref 22–28)
HCT VFR BLD AUTO: 32.5 % (ref 37.5–51)
HCT VFR BLD AUTO: 34.1 % (ref 37.5–51)
HCT VFR BLD AUTO: 34.6 % (ref 37.5–51)
HCT VFR BLD AUTO: 34.9 % (ref 37.5–51)
HCT VFR BLD AUTO: 36.5 % (ref 37.5–51)
HCT VFR BLD AUTO: 37.7 % (ref 37.5–51)
HCT VFR BLD AUTO: 41 % (ref 37.5–51)
HCT VFR BLD AUTO: 42.3 % (ref 37.5–51)
HCT VFR BLD AUTO: 42.8 % (ref 37.5–51)
HCT VFR BLD AUTO: 43.5 % (ref 37.5–51)
HDLC SERPL-MCNC: 38 MG/DL
HDLC SERPL-MCNC: 40 MG/DL (ref 40–60)
HGB BLD-MCNC: 11.6 G/DL (ref 13–17.7)
HGB BLD-MCNC: 11.6 G/DL (ref 13–17.7)
HGB BLD-MCNC: 12.1 G/DL (ref 13–17.7)
HGB BLD-MCNC: 12.4 G/DL (ref 13–17.7)
HGB BLD-MCNC: 12.5 G/DL (ref 13–17.7)
HGB BLD-MCNC: 13.1 G/DL (ref 13–17.7)
HGB BLD-MCNC: 14.1 G/DL (ref 13–17.7)
HGB BLD-MCNC: 14.2 G/DL (ref 13–17.7)
HGB BLD-MCNC: 14.9 G/DL (ref 13–17.7)
HGB BLD-MCNC: 15.4 G/DL (ref 13–17.7)
HGB UR QL STRIP.AUTO: NEGATIVE
HOLD SPECIMEN: NORMAL
HOLD SPECIMEN: NORMAL
IMM GRANULOCYTES # BLD AUTO: 0.13 10*3/MM3 (ref 0–0.05)
IMM GRANULOCYTES # BLD AUTO: 0.14 10*3/MM3 (ref 0–0.05)
IMM GRANULOCYTES # BLD AUTO: 0.23 10*3/MM3 (ref 0–0.05)
IMM GRANULOCYTES # BLD AUTO: 0.24 10*3/MM3 (ref 0–0.05)
IMM GRANULOCYTES # BLD AUTO: 0.25 10*3/MM3 (ref 0–0.05)
IMM GRANULOCYTES # BLD AUTO: 0.32 10*3/MM3 (ref 0–0.05)
IMM GRANULOCYTES # BLD AUTO: 0.69 10*3/MM3 (ref 0–0.05)
IMM GRANULOCYTES NFR BLD AUTO: 0.7 % (ref 0–0.5)
IMM GRANULOCYTES NFR BLD AUTO: 1 % (ref 0–0.5)
IMM GRANULOCYTES NFR BLD AUTO: 1.3 % (ref 0–0.5)
IMM GRANULOCYTES NFR BLD AUTO: 1.8 % (ref 0–0.5)
IMM GRANULOCYTES NFR BLD AUTO: 2 % (ref 0–0.5)
IMM GRANULOCYTES NFR BLD AUTO: 2.7 % (ref 0–0.5)
IMM GRANULOCYTES NFR BLD AUTO: 4.9 % (ref 0–0.5)
INHALED O2 CONCENTRATION: 21 %
INR PPP: 1.02 (ref 0.9–1.1)
INR PPP: 1.11 (ref 0.9–1.1)
INTERNAL CONTROL: NORMAL
KETONES UR QL STRIP: NEGATIVE
LDLC SERPL CALC-MCNC: 51 MG/DL (ref 0–99)
LDLC SERPL CALC-MCNC: 64 MG/DL (ref 0–100)
LDLC/HDLC SERPL: 1.59 {RATIO}
LEFT ATRIUM VOLUME INDEX: 66.6 ML/M2
LEUKOCYTE ESTERASE UR QL STRIP.AUTO: NEGATIVE
LYMPHOCYTES # BLD AUTO: 1.25 10*3/MM3 (ref 0.7–3.1)
LYMPHOCYTES # BLD AUTO: 1.46 10*3/MM3 (ref 0.7–3.1)
LYMPHOCYTES # BLD AUTO: 1.46 10*3/MM3 (ref 0.7–3.1)
LYMPHOCYTES # BLD AUTO: 1.56 10*3/MM3 (ref 0.7–3.1)
LYMPHOCYTES # BLD AUTO: 1.75 10*3/MM3 (ref 0.7–3.1)
LYMPHOCYTES # BLD AUTO: 1.79 10*3/MM3 (ref 0.7–3.1)
LYMPHOCYTES # BLD AUTO: 2.42 10*3/MM3 (ref 0.7–3.1)
LYMPHOCYTES NFR BLD AUTO: 10.2 % (ref 19.6–45.3)
LYMPHOCYTES NFR BLD AUTO: 10.8 % (ref 19.6–45.3)
LYMPHOCYTES NFR BLD AUTO: 12.7 % (ref 19.6–45.3)
LYMPHOCYTES NFR BLD AUTO: 14.5 % (ref 19.6–45.3)
LYMPHOCYTES NFR BLD AUTO: 17.2 % (ref 19.6–45.3)
LYMPHOCYTES NFR BLD AUTO: 7.4 % (ref 19.6–45.3)
LYMPHOCYTES NFR BLD AUTO: 8.8 % (ref 19.6–45.3)
Lab: NORMAL
MAGNESIUM SERPL-MCNC: 2.2 MG/DL (ref 1.6–2.4)
MAGNESIUM SERPL-MCNC: 2.3 MG/DL (ref 1.6–2.4)
MAGNESIUM SERPL-MCNC: 2.7 MG/DL (ref 1.6–2.4)
MAXIMAL PREDICTED HEART RATE: 132 BPM
MAXIMAL PREDICTED HEART RATE: 132 BPM
MCH RBC QN AUTO: 30.8 PG (ref 26.6–33)
MCH RBC QN AUTO: 31 PG (ref 26.6–33)
MCH RBC QN AUTO: 31.2 PG (ref 26.6–33)
MCH RBC QN AUTO: 31.4 PG (ref 26.6–33)
MCH RBC QN AUTO: 31.6 PG (ref 26.6–33)
MCH RBC QN AUTO: 31.6 PG (ref 26.6–33)
MCH RBC QN AUTO: 31.7 PG (ref 26.6–33)
MCH RBC QN AUTO: 31.9 PG (ref 26.6–33)
MCH RBC QN AUTO: 31.9 PG (ref 26.6–33)
MCH RBC QN AUTO: 32.3 PG (ref 26.6–33)
MCHC RBC AUTO-ENTMCNC: 33.6 G/DL (ref 31.5–35.7)
MCHC RBC AUTO-ENTMCNC: 34 G/DL (ref 31.5–35.7)
MCHC RBC AUTO-ENTMCNC: 34 G/DL (ref 31.5–35.7)
MCHC RBC AUTO-ENTMCNC: 34.4 G/DL (ref 31.5–35.7)
MCHC RBC AUTO-ENTMCNC: 34.7 G/DL (ref 31.5–35.7)
MCHC RBC AUTO-ENTMCNC: 34.7 G/DL (ref 31.5–35.7)
MCHC RBC AUTO-ENTMCNC: 34.8 G/DL (ref 31.5–35.7)
MCHC RBC AUTO-ENTMCNC: 35.4 G/DL (ref 31.5–35.7)
MCHC RBC AUTO-ENTMCNC: 35.7 G/DL (ref 31.5–35.7)
MCHC RBC AUTO-ENTMCNC: 36.1 G/DL (ref 31.5–35.7)
MCV RBC AUTO: 87.4 FL (ref 79–97)
MCV RBC AUTO: 88.1 FL (ref 79–97)
MCV RBC AUTO: 88.7 FL (ref 79–97)
MCV RBC AUTO: 89.2 FL (ref 79–97)
MCV RBC AUTO: 89.3 FL (ref 79–97)
MCV RBC AUTO: 89.9 FL (ref 79–97)
MCV RBC AUTO: 92.8 FL (ref 79–97)
MCV RBC AUTO: 93.4 FL (ref 79–97)
MCV RBC AUTO: 93.7 FL (ref 79–97)
MCV RBC AUTO: 96.1 FL (ref 79–97)
MODALITY: ABNORMAL
MODALITY: ABNORMAL
MONOCYTES # BLD AUTO: 1.17 10*3/MM3 (ref 0.1–0.9)
MONOCYTES # BLD AUTO: 1.49 10*3/MM3 (ref 0.1–0.9)
MONOCYTES # BLD AUTO: 1.58 10*3/MM3 (ref 0.1–0.9)
MONOCYTES # BLD AUTO: 1.78 10*3/MM3 (ref 0.1–0.9)
MONOCYTES # BLD AUTO: 1.81 10*3/MM3 (ref 0.1–0.9)
MONOCYTES # BLD AUTO: 2.34 10*3/MM3 (ref 0.1–0.9)
MONOCYTES # BLD AUTO: 2.37 10*3/MM3 (ref 0.1–0.9)
MONOCYTES NFR BLD AUTO: 11.9 % (ref 5–12)
MONOCYTES NFR BLD AUTO: 12.4 % (ref 5–12)
MONOCYTES NFR BLD AUTO: 12.9 % (ref 5–12)
MONOCYTES NFR BLD AUTO: 12.9 % (ref 5–12)
MONOCYTES NFR BLD AUTO: 13.2 % (ref 5–12)
MONOCYTES NFR BLD AUTO: 13.5 % (ref 5–12)
MONOCYTES NFR BLD AUTO: 8.2 % (ref 5–12)
NEUTROPHILS NFR BLD AUTO: 10.05 10*3/MM3 (ref 1.7–7)
NEUTROPHILS NFR BLD AUTO: 11.43 10*3/MM3 (ref 1.7–7)
NEUTROPHILS NFR BLD AUTO: 13.16 10*3/MM3 (ref 1.7–7)
NEUTROPHILS NFR BLD AUTO: 15.73 10*3/MM3 (ref 1.7–7)
NEUTROPHILS NFR BLD AUTO: 60.7 % (ref 42.7–76)
NEUTROPHILS NFR BLD AUTO: 68.2 % (ref 42.7–76)
NEUTROPHILS NFR BLD AUTO: 71.4 % (ref 42.7–76)
NEUTROPHILS NFR BLD AUTO: 74.5 % (ref 42.7–76)
NEUTROPHILS NFR BLD AUTO: 74.6 % (ref 42.7–76)
NEUTROPHILS NFR BLD AUTO: 79.7 % (ref 42.7–76)
NEUTROPHILS NFR BLD AUTO: 8.24 10*3/MM3 (ref 1.7–7)
NEUTROPHILS NFR BLD AUTO: 8.51 10*3/MM3 (ref 1.7–7)
NEUTROPHILS NFR BLD AUTO: 8.74 10*3/MM3 (ref 1.7–7)
NEUTROPHILS NFR BLD AUTO: 80.3 % (ref 42.7–76)
NITRITE UR QL STRIP: NEGATIVE
NRBC BLD AUTO-RTO: 0 /100 WBC (ref 0–0.2)
NRBC BLD AUTO-RTO: 0.1 /100 WBC (ref 0–0.2)
NRBC BLD AUTO-RTO: 0.1 /100 WBC (ref 0–0.2)
NT-PROBNP SERPL-MCNC: 453 PG/ML (ref 0–1800)
O2 A-A PPRESDIFF RESPIRATORY: 0.7 MMHG
PCO2 BLDA: 26.1 MM HG (ref 35–45)
PCO2 BLDA: 48 MM HG (ref 35–45)
PH BLDA: 7.34 PH UNITS (ref 7.35–7.45)
PH BLDA: 7.5 PH UNITS (ref 7.35–7.45)
PH UR STRIP.AUTO: 8 [PH] (ref 5–8)
PLATELET # BLD AUTO: 214 10*3/MM3 (ref 140–450)
PLATELET # BLD AUTO: 223 10*3/MM3 (ref 140–450)
PLATELET # BLD AUTO: 226 10*3/MM3 (ref 140–450)
PLATELET # BLD AUTO: 258 10*3/MM3 (ref 140–450)
PLATELET # BLD AUTO: 261 10*3/MM3 (ref 140–450)
PLATELET # BLD AUTO: 272 10*3/MM3 (ref 140–450)
PLATELET # BLD AUTO: 389 10*3/MM3 (ref 140–450)
PLATELET # BLD AUTO: 456 10*3/MM3 (ref 140–450)
PLATELET # BLD AUTO: 480 10*3/MM3 (ref 140–450)
PLATELET # BLD AUTO: 497 10*3/MM3 (ref 140–450)
PMV BLD AUTO: 10 FL (ref 6–12)
PMV BLD AUTO: 10.1 FL (ref 6–12)
PMV BLD AUTO: 10.2 FL (ref 6–12)
PMV BLD AUTO: 10.3 FL (ref 6–12)
PMV BLD AUTO: 10.6 FL (ref 6–12)
PMV BLD AUTO: 9.9 FL (ref 6–12)
PO2 BLDA: 63.4 MM HG (ref 80–100)
PO2 BLDA: 69.5 MM HG (ref 80–100)
POTASSIUM SERPL-SCNC: 3.4 MMOL/L (ref 3.5–5.2)
POTASSIUM SERPL-SCNC: 3.7 MMOL/L (ref 3.5–5.2)
POTASSIUM SERPL-SCNC: 3.7 MMOL/L (ref 3.5–5.2)
POTASSIUM SERPL-SCNC: 3.8 MMOL/L (ref 3.5–5.2)
POTASSIUM SERPL-SCNC: 3.9 MMOL/L (ref 3.5–5.2)
POTASSIUM SERPL-SCNC: 4 MMOL/L (ref 3.5–5.2)
POTASSIUM SERPL-SCNC: 4.1 MMOL/L (ref 3.5–5.2)
POTASSIUM SERPL-SCNC: 4.4 MMOL/L (ref 3.5–5.2)
POTASSIUM SERPL-SCNC: 4.5 MMOL/L (ref 3.5–5.2)
POTASSIUM SERPL-SCNC: 4.6 MMOL/L (ref 3.5–5.2)
PROCALCITONIN SERPL-MCNC: 0.05 NG/ML (ref 0–0.25)
PROCALCITONIN SERPL-MCNC: 0.31 NG/ML (ref 0–0.25)
PROT SERPL-MCNC: 6 G/DL (ref 6–8.5)
PROT SERPL-MCNC: 6.3 G/DL (ref 6–8.5)
PROT UR QL STRIP: ABNORMAL
PROTHROMBIN TIME: 13.5 SECONDS (ref 11.7–14.2)
PROTHROMBIN TIME: 14.5 SECONDS (ref 11.7–14.2)
QT INTERVAL: 274 MS
QT INTERVAL: 303 MS
QT INTERVAL: 386 MS
QT INTERVAL: 391 MS
RBC # BLD AUTO: 3.64 10*6/MM3 (ref 4.14–5.8)
RBC # BLD AUTO: 3.69 10*6/MM3 (ref 4.14–5.8)
RBC # BLD AUTO: 3.88 10*6/MM3 (ref 4.14–5.8)
RBC # BLD AUTO: 3.91 10*6/MM3 (ref 4.14–5.8)
RBC # BLD AUTO: 3.96 10*6/MM3 (ref 4.14–5.8)
RBC # BLD AUTO: 4.25 10*6/MM3 (ref 4.14–5.8)
RBC # BLD AUTO: 4.4 10*6/MM3 (ref 4.14–5.8)
RBC # BLD AUTO: 4.42 10*6/MM3 (ref 4.14–5.8)
RBC # BLD AUTO: 4.8 10*6/MM3 (ref 4.14–5.8)
RBC # BLD AUTO: 4.87 10*6/MM3 (ref 4.14–5.8)
RH BLD: POSITIVE
SAO2 % BLDCOA: 92.3 % (ref 92–99)
SAO2 % BLDCOA: 94.3 % (ref 92–99)
SARS-COV-2 AG UPPER RESP QL IA.RAPID: NOT DETECTED
SINUS: 3.4 CM
SODIUM SERPL-SCNC: 139 MMOL/L (ref 136–145)
SODIUM SERPL-SCNC: 139 MMOL/L (ref 136–145)
SODIUM SERPL-SCNC: 140 MMOL/L (ref 136–145)
SODIUM SERPL-SCNC: 140 MMOL/L (ref 136–145)
SODIUM SERPL-SCNC: 141 MMOL/L (ref 136–145)
SODIUM SERPL-SCNC: 142 MMOL/L (ref 136–145)
SODIUM SERPL-SCNC: 143 MMOL/L (ref 136–145)
SODIUM SERPL-SCNC: 145 MMOL/L (ref 134–144)
SODIUM SERPL-SCNC: 145 MMOL/L (ref 136–145)
SODIUM SERPL-SCNC: 146 MMOL/L (ref 136–145)
SODIUM SERPL-SCNC: 149 MMOL/L (ref 136–145)
SP GR UR STRIP: 1.01 (ref 1–1.03)
STJ: 3.1 CM
STRESS TARGET HR: 112 BPM
STRESS TARGET HR: 112 BPM
T&S EXPIRATION DATE: NORMAL
TOTAL RATE: 28 BREATHS/MINUTE
TOTAL RATE: 28 BREATHS/MINUTE
TRIGL SERPL-MCNC: 62 MG/DL (ref 0–149)
TRIGL SERPL-MCNC: 88 MG/DL (ref 0–150)
TROPONIN T SERPL-MCNC: <0.01 NG/ML (ref 0–0.03)
TROPONIN T SERPL-MCNC: <0.01 NG/ML (ref 0–0.03)
TSH SERPL DL<=0.05 MIU/L-ACNC: 1.51 UIU/ML (ref 0.27–4.2)
UNIT  ABO: NORMAL
UNIT  RH: NORMAL
UROBILINOGEN UR QL STRIP: ABNORMAL
VLDLC SERPL CALC-MCNC: 14 MG/DL (ref 5–40)
VLDLC SERPL-MCNC: 17 MG/DL (ref 5–40)
WBC NRBC COR # BLD: 12.06 10*3/MM3 (ref 3.4–10.8)
WBC NRBC COR # BLD: 12.24 10*3/MM3 (ref 3.4–10.8)
WBC NRBC COR # BLD: 13.5 10*3/MM3 (ref 3.4–10.8)
WBC NRBC COR # BLD: 14.03 10*3/MM3 (ref 3.4–10.8)
WBC NRBC COR # BLD: 14.23 10*3/MM3 (ref 3.4–10.8)
WBC NRBC COR # BLD: 16.62 10*3/MM3 (ref 3.4–10.8)
WBC NRBC COR # BLD: 16.76 10*3/MM3 (ref 3.4–10.8)
WBC NRBC COR # BLD: 17.61 10*3/MM3 (ref 3.4–10.8)
WBC NRBC COR # BLD: 19.72 10*3/MM3 (ref 3.4–10.8)
WBC NRBC COR # BLD: 28.71 10*3/MM3 (ref 3.4–10.8)
WHOLE BLOOD HOLD COAG: NORMAL
WHOLE BLOOD HOLD SPECIMEN: NORMAL

## 2022-01-01 PROCEDURE — 1160F RVW MEDS BY RX/DR IN RCRD: CPT | Performed by: FAMILY MEDICINE

## 2022-01-01 PROCEDURE — P9035 PLATELET PHERES LEUKOREDUCED: HCPCS

## 2022-01-01 PROCEDURE — 25010000002 MORPHINE PER 10 MG: Performed by: NEUROLOGICAL SURGERY

## 2022-01-01 PROCEDURE — C1769 GUIDE WIRE: HCPCS | Performed by: SURGERY

## 2022-01-01 PROCEDURE — 94761 N-INVAS EAR/PLS OXIMETRY MLT: CPT

## 2022-01-01 PROCEDURE — 99024 POSTOP FOLLOW-UP VISIT: CPT | Performed by: NURSE PRACTITIONER

## 2022-01-01 PROCEDURE — 25010000002 LEVETRIRACETAM PER 10 MG: Performed by: NEUROLOGICAL SURGERY

## 2022-01-01 PROCEDURE — 99232 SBSQ HOSP IP/OBS MODERATE 35: CPT | Performed by: NURSE PRACTITIONER

## 2022-01-01 PROCEDURE — P9100 PATHOGEN TEST FOR PLATELETS: HCPCS

## 2022-01-01 PROCEDURE — 25810000003 SODIUM CHLORIDE 0.9 % WITH KCL 40 MEQ/L 40-0.9 MEQ/L-% SOLUTION: Performed by: INTERNAL MEDICINE

## 2022-01-01 PROCEDURE — 82565 ASSAY OF CREATININE: CPT

## 2022-01-01 PROCEDURE — 82962 GLUCOSE BLOOD TEST: CPT

## 2022-01-01 PROCEDURE — 25010000002 DIGOXIN PER 500 MCG: Performed by: INTERNAL MEDICINE

## 2022-01-01 PROCEDURE — 83735 ASSAY OF MAGNESIUM: CPT | Performed by: NURSE PRACTITIONER

## 2022-01-01 PROCEDURE — 94799 UNLISTED PULMONARY SVC/PX: CPT

## 2022-01-01 PROCEDURE — 43246 EGD PLACE GASTROSTOMY TUBE: CPT | Performed by: STUDENT IN AN ORGANIZED HEALTH CARE EDUCATION/TRAINING PROGRAM

## 2022-01-01 PROCEDURE — 83880 ASSAY OF NATRIURETIC PEPTIDE: CPT | Performed by: NURSE PRACTITIONER

## 2022-01-01 PROCEDURE — 94640 AIRWAY INHALATION TREATMENT: CPT

## 2022-01-01 PROCEDURE — 85025 COMPLETE CBC W/AUTO DIFF WBC: CPT | Performed by: INTERNAL MEDICINE

## 2022-01-01 PROCEDURE — 85610 PROTHROMBIN TIME: CPT | Performed by: EMERGENCY MEDICINE

## 2022-01-01 PROCEDURE — 99214 OFFICE O/P EST MOD 30 MIN: CPT | Performed by: FAMILY MEDICINE

## 2022-01-01 PROCEDURE — C1713 ANCHOR/SCREW BN/BN,TIS/BN: HCPCS | Performed by: NEUROLOGICAL SURGERY

## 2022-01-01 PROCEDURE — 99232 SBSQ HOSP IP/OBS MODERATE 35: CPT | Performed by: INTERNAL MEDICINE

## 2022-01-01 PROCEDURE — 80048 BASIC METABOLIC PNL TOTAL CA: CPT | Performed by: HOSPITALIST

## 2022-01-01 PROCEDURE — 99024 POSTOP FOLLOW-UP VISIT: CPT | Performed by: NEUROLOGICAL SURGERY

## 2022-01-01 PROCEDURE — 99222 1ST HOSP IP/OBS MODERATE 55: CPT | Performed by: NURSE PRACTITIONER

## 2022-01-01 PROCEDURE — 25010000002 PROPOFOL 10 MG/ML EMULSION: Performed by: NURSE ANESTHETIST, CERTIFIED REGISTERED

## 2022-01-01 PROCEDURE — 71045 X-RAY EXAM CHEST 1 VIEW: CPT

## 2022-01-01 PROCEDURE — 80048 BASIC METABOLIC PNL TOTAL CA: CPT | Performed by: INTERNAL MEDICINE

## 2022-01-01 PROCEDURE — 0 POTASSIUM CHLORIDE PER 2 MEQ: Performed by: NEUROLOGICAL SURGERY

## 2022-01-01 PROCEDURE — 36415 COLL VENOUS BLD VENIPUNCTURE: CPT

## 2022-01-01 PROCEDURE — 70450 CT HEAD/BRAIN W/O DYE: CPT

## 2022-01-01 PROCEDURE — 93010 ELECTROCARDIOGRAM REPORT: CPT | Performed by: STUDENT IN AN ORGANIZED HEALTH CARE EDUCATION/TRAINING PROGRAM

## 2022-01-01 PROCEDURE — 93010 ELECTROCARDIOGRAM REPORT: CPT | Performed by: INTERNAL MEDICINE

## 2022-01-01 PROCEDURE — 84443 ASSAY THYROID STIM HORMONE: CPT | Performed by: NURSE PRACTITIONER

## 2022-01-01 PROCEDURE — 86900 BLOOD TYPING SEROLOGIC ABO: CPT | Performed by: EMERGENCY MEDICINE

## 2022-01-01 PROCEDURE — 25010000002 HYDRALAZINE PER 20 MG: Performed by: INTERNAL MEDICINE

## 2022-01-01 PROCEDURE — 1170F FXNL STATUS ASSESSED: CPT | Performed by: FAMILY MEDICINE

## 2022-01-01 PROCEDURE — 94664 DEMO&/EVAL PT USE INHALER: CPT

## 2022-01-01 PROCEDURE — 84484 ASSAY OF TROPONIN QUANT: CPT | Performed by: INTERNAL MEDICINE

## 2022-01-01 PROCEDURE — 92526 ORAL FUNCTION THERAPY: CPT

## 2022-01-01 PROCEDURE — 36430 TRANSFUSION BLD/BLD COMPNT: CPT

## 2022-01-01 PROCEDURE — 93005 ELECTROCARDIOGRAM TRACING: CPT | Performed by: INTERNAL MEDICINE

## 2022-01-01 PROCEDURE — 97530 THERAPEUTIC ACTIVITIES: CPT

## 2022-01-01 PROCEDURE — 43246 EGD PLACE GASTROSTOMY TUBE: CPT | Performed by: SURGERY

## 2022-01-01 PROCEDURE — 80053 COMPREHEN METABOLIC PANEL: CPT | Performed by: EMERGENCY MEDICINE

## 2022-01-01 PROCEDURE — 25010000002 CEFAZOLIN PER 500 MG: Performed by: NURSE ANESTHETIST, CERTIFIED REGISTERED

## 2022-01-01 PROCEDURE — 86901 BLOOD TYPING SEROLOGIC RH(D): CPT | Performed by: EMERGENCY MEDICINE

## 2022-01-01 PROCEDURE — 25010000002 HYDRALAZINE PER 20 MG: Performed by: STUDENT IN AN ORGANIZED HEALTH CARE EDUCATION/TRAINING PROGRAM

## 2022-01-01 PROCEDURE — 99213 OFFICE O/P EST LOW 20 MIN: CPT | Performed by: FAMILY MEDICINE

## 2022-01-01 PROCEDURE — 99231 SBSQ HOSP IP/OBS SF/LOW 25: CPT | Performed by: SURGERY

## 2022-01-01 PROCEDURE — 85027 COMPLETE CBC AUTOMATED: CPT | Performed by: HOSPITALIST

## 2022-01-01 PROCEDURE — 25010000002 HYDROMORPHONE 1 MG/ML SOLUTION: Performed by: ANESTHESIOLOGY

## 2022-01-01 PROCEDURE — 93306 TTE W/DOPPLER COMPLETE: CPT | Performed by: INTERNAL MEDICINE

## 2022-01-01 PROCEDURE — 85730 THROMBOPLASTIN TIME PARTIAL: CPT | Performed by: NEUROLOGICAL SURGERY

## 2022-01-01 PROCEDURE — 84145 PROCALCITONIN (PCT): CPT | Performed by: NURSE PRACTITIONER

## 2022-01-01 PROCEDURE — 74018 RADEX ABDOMEN 1 VIEW: CPT

## 2022-01-01 PROCEDURE — 36600 WITHDRAWAL OF ARTERIAL BLOOD: CPT

## 2022-01-01 PROCEDURE — 0 DIATRIZOATE MEGLUMINE & SODIUM PER 1 ML: Performed by: STUDENT IN AN ORGANIZED HEALTH CARE EDUCATION/TRAINING PROGRAM

## 2022-01-01 PROCEDURE — 25010000002 MORPHINE PER 10 MG: Performed by: SURGERY

## 2022-01-01 PROCEDURE — 25010000002 PIPERACILLIN SOD-TAZOBACTAM PER 1 G: Performed by: NURSE PRACTITIONER

## 2022-01-01 PROCEDURE — 85027 COMPLETE CBC AUTOMATED: CPT | Performed by: NURSE PRACTITIONER

## 2022-01-01 PROCEDURE — C1769 GUIDE WIRE: HCPCS | Performed by: STUDENT IN AN ORGANIZED HEALTH CARE EDUCATION/TRAINING PROGRAM

## 2022-01-01 PROCEDURE — 61312 CRNEC/CRNOT STTL XDRL/SDRL: CPT | Performed by: NEUROLOGICAL SURGERY

## 2022-01-01 PROCEDURE — 25010000002 LORAZEPAM PER 2 MG: Performed by: NURSE PRACTITIONER

## 2022-01-01 PROCEDURE — 93970 EXTREMITY STUDY: CPT

## 2022-01-01 PROCEDURE — 86850 RBC ANTIBODY SCREEN: CPT | Performed by: EMERGENCY MEDICINE

## 2022-01-01 PROCEDURE — 25010000002 PERFLUTREN (DEFINITY) 8.476 MG IN SODIUM CHLORIDE (PF) 0.9 % 10 ML INJECTION: Performed by: NURSE PRACTITIONER

## 2022-01-01 PROCEDURE — 25010000002 DEXAMETHASONE SODIUM PHOSPHATE 20 MG/5ML SOLUTION: Performed by: NURSE ANESTHETIST, CERTIFIED REGISTERED

## 2022-01-01 PROCEDURE — 93306 TTE W/DOPPLER COMPLETE: CPT

## 2022-01-01 PROCEDURE — 61312 CRNEC/CRNOT STTL XDRL/SDRL: CPT | Performed by: SPECIALIST/TECHNOLOGIST, OTHER

## 2022-01-01 PROCEDURE — 82803 BLOOD GASES ANY COMBINATION: CPT

## 2022-01-01 PROCEDURE — 99285 EMERGENCY DEPT VISIT HI MDM: CPT

## 2022-01-01 PROCEDURE — G0439 PPPS, SUBSEQ VISIT: HCPCS | Performed by: FAMILY MEDICINE

## 2022-01-01 PROCEDURE — 80048 BASIC METABOLIC PNL TOTAL CA: CPT | Performed by: NEUROLOGICAL SURGERY

## 2022-01-01 PROCEDURE — 94760 N-INVAS EAR/PLS OXIMETRY 1: CPT

## 2022-01-01 PROCEDURE — 80061 LIPID PANEL: CPT | Performed by: NEUROLOGICAL SURGERY

## 2022-01-01 PROCEDURE — 25010000002 CEFAZOLIN IN DEXTROSE 2-4 GM/100ML-% SOLUTION: Performed by: SURGERY

## 2022-01-01 PROCEDURE — 80162 ASSAY OF DIGOXIN TOTAL: CPT | Performed by: INTERNAL MEDICINE

## 2022-01-01 PROCEDURE — 00C40ZZ EXTIRPATION OF MATTER FROM INTRACRANIAL SUBDURAL SPACE, OPEN APPROACH: ICD-10-PCS | Performed by: NEUROLOGICAL SURGERY

## 2022-01-01 PROCEDURE — C1889 IMPLANT/INSERT DEVICE, NOC: HCPCS | Performed by: NEUROLOGICAL SURGERY

## 2022-01-01 PROCEDURE — 85610 PROTHROMBIN TIME: CPT | Performed by: NEUROLOGICAL SURGERY

## 2022-01-01 PROCEDURE — 83605 ASSAY OF LACTIC ACID: CPT | Performed by: NURSE PRACTITIONER

## 2022-01-01 PROCEDURE — 84132 ASSAY OF SERUM POTASSIUM: CPT | Performed by: INTERNAL MEDICINE

## 2022-01-01 PROCEDURE — 92610 EVALUATE SWALLOWING FUNCTION: CPT

## 2022-01-01 PROCEDURE — 85730 THROMBOPLASTIN TIME PARTIAL: CPT | Performed by: EMERGENCY MEDICINE

## 2022-01-01 PROCEDURE — 83036 HEMOGLOBIN GLYCOSYLATED A1C: CPT | Performed by: NEUROLOGICAL SURGERY

## 2022-01-01 PROCEDURE — 84484 ASSAY OF TROPONIN QUANT: CPT | Performed by: EMERGENCY MEDICINE

## 2022-01-01 PROCEDURE — 99213 OFFICE O/P EST LOW 20 MIN: CPT | Performed by: NURSE PRACTITIONER

## 2022-01-01 PROCEDURE — 25010000002 CEFAZOLIN PER 500 MG: Performed by: NEUROLOGICAL SURGERY

## 2022-01-01 PROCEDURE — 80048 BASIC METABOLIC PNL TOTAL CA: CPT | Performed by: NURSE PRACTITIONER

## 2022-01-01 PROCEDURE — 25010000002 FENTANYL CITRATE (PF) 50 MCG/ML SOLUTION: Performed by: NURSE ANESTHETIST, CERTIFIED REGISTERED

## 2022-01-01 PROCEDURE — 83735 ASSAY OF MAGNESIUM: CPT | Performed by: INTERNAL MEDICINE

## 2022-01-01 PROCEDURE — 93005 ELECTROCARDIOGRAM TRACING: CPT | Performed by: EMERGENCY MEDICINE

## 2022-01-01 PROCEDURE — 25010000002 LEVETRIRACETAM PER 10 MG: Performed by: SURGERY

## 2022-01-01 PROCEDURE — 85025 COMPLETE CBC W/AUTO DIFF WBC: CPT | Performed by: EMERGENCY MEDICINE

## 2022-01-01 PROCEDURE — 25010000002 ONDANSETRON PER 1 MG: Performed by: ANESTHESIOLOGY

## 2022-01-01 PROCEDURE — 25010000002 PHENYLEPHRINE 10 MG/ML SOLUTION 5 ML VIAL: Performed by: ANESTHESIOLOGY

## 2022-01-01 PROCEDURE — 84145 PROCALCITONIN (PCT): CPT | Performed by: HOSPITALIST

## 2022-01-01 PROCEDURE — 81003 URINALYSIS AUTO W/O SCOPE: CPT | Performed by: HOSPITALIST

## 2022-01-01 PROCEDURE — 25010000002 DIGOXIN PER 500 MCG: Performed by: STUDENT IN AN ORGANIZED HEALTH CARE EDUCATION/TRAINING PROGRAM

## 2022-01-01 PROCEDURE — 87426 SARSCOV CORONAVIRUS AG IA: CPT | Performed by: FAMILY MEDICINE

## 2022-01-01 PROCEDURE — 97162 PT EVAL MOD COMPLEX 30 MIN: CPT

## 2022-01-01 PROCEDURE — 0DJ08ZZ INSPECTION OF UPPER INTESTINAL TRACT, VIA NATURAL OR ARTIFICIAL OPENING ENDOSCOPIC: ICD-10-PCS | Performed by: STUDENT IN AN ORGANIZED HEALTH CARE EDUCATION/TRAINING PROGRAM

## 2022-01-01 PROCEDURE — 80162 ASSAY OF DIGOXIN TOTAL: CPT | Performed by: EMERGENCY MEDICINE

## 2022-01-01 PROCEDURE — 25010000002 LEVETRIRACETAM PER 10 MG: Performed by: STUDENT IN AN ORGANIZED HEALTH CARE EDUCATION/TRAINING PROGRAM

## 2022-01-01 DEVICE — PLT CMF LEVELONE LP NEURO SQ SEG TI 3X2HL .6X1.5MM: Type: IMPLANTABLE DEVICE | Site: CRANIAL | Status: FUNCTIONAL

## 2022-01-01 DEVICE — PLT CVR LEVELONE BURHL LP CONTRL .3X22X1.5MM: Type: IMPLANTABLE DEVICE | Site: CRANIAL | Status: FUNCTIONAL

## 2022-01-01 DEVICE — ABSORBABLE HEMOSTAT (OXIDIZED REGENERATED CELLULOSE, U.S.P.)
Type: IMPLANTABLE DEVICE | Site: BRAIN | Status: FUNCTIONAL
Brand: SURGICEL FIBRILLAR

## 2022-01-01 DEVICE — SCRW CRS/DRV DRL FREE MICRO TI 1.5X4MM: Type: IMPLANTABLE DEVICE | Site: CRANIAL | Status: FUNCTIONAL

## 2022-01-01 DEVICE — BONE WAX
Type: IMPLANTABLE DEVICE | Site: CRANIAL | Status: FUNCTIONAL
Brand: ETHICON

## 2022-01-01 DEVICE — FLOSEAL HEMOSTATIC MATRIX, 10ML
Type: IMPLANTABLE DEVICE | Site: BRAIN | Status: FUNCTIONAL
Brand: FLOSEAL HEMOSTATIC MATRIX

## 2022-01-01 DEVICE — DURAGEN® PLUS DURAL REGENERATION MATRIX , 4 IN X 5 IN
Type: IMPLANTABLE DEVICE | Site: BRAIN | Status: FUNCTIONAL
Brand: DURAGEN® PLUS

## 2022-01-01 RX ORDER — CLOPIDOGREL BISULFATE 75 MG/1
75 TABLET ORAL DAILY
Qty: 90 TABLET | Refills: 1 | Status: SHIPPED | OUTPATIENT
Start: 2022-01-01

## 2022-01-01 RX ORDER — MORPHINE SULFATE 2 MG/ML
2 INJECTION, SOLUTION INTRAMUSCULAR; INTRAVENOUS
Status: DISCONTINUED | OUTPATIENT
Start: 2022-01-01 | End: 2023-01-01 | Stop reason: HOSPADM

## 2022-01-01 RX ORDER — LORAZEPAM 2 MG/ML
0.5 INJECTION INTRAMUSCULAR ONCE
Status: COMPLETED | OUTPATIENT
Start: 2022-01-01 | End: 2022-01-01

## 2022-01-01 RX ORDER — FLUMAZENIL 0.1 MG/ML
0.2 INJECTION INTRAVENOUS AS NEEDED
Status: DISCONTINUED | OUTPATIENT
Start: 2022-01-01 | End: 2022-01-01 | Stop reason: HOSPADM

## 2022-01-01 RX ORDER — DIGOXIN 125 MCG
125 TABLET ORAL
Status: DISCONTINUED | OUTPATIENT
Start: 2023-01-01 | End: 2023-01-01 | Stop reason: HOSPADM

## 2022-01-01 RX ORDER — DIPHENHYDRAMINE HCL 25 MG
25 CAPSULE ORAL
Status: DISCONTINUED | OUTPATIENT
Start: 2022-01-01 | End: 2022-01-01 | Stop reason: HOSPADM

## 2022-01-01 RX ORDER — CEFAZOLIN SODIUM 500 MG/2.2ML
INJECTION, POWDER, FOR SOLUTION INTRAMUSCULAR; INTRAVENOUS AS NEEDED
Status: DISCONTINUED | OUTPATIENT
Start: 2022-01-01 | End: 2022-01-01 | Stop reason: SURG

## 2022-01-01 RX ORDER — PHENYLEPHRINE HCL IN 0.9% NACL 1 MG/10 ML
SYRINGE (ML) INTRAVENOUS AS NEEDED
Status: DISCONTINUED | OUTPATIENT
Start: 2022-01-01 | End: 2022-01-01 | Stop reason: SURG

## 2022-01-01 RX ORDER — OLANZAPINE 10 MG/1
5 INJECTION, POWDER, LYOPHILIZED, FOR SOLUTION INTRAMUSCULAR ONCE
Status: DISCONTINUED | OUTPATIENT
Start: 2022-01-01 | End: 2022-01-01

## 2022-01-01 RX ORDER — SODIUM CHLORIDE, SODIUM LACTATE, POTASSIUM CHLORIDE, CALCIUM CHLORIDE 600; 310; 30; 20 MG/100ML; MG/100ML; MG/100ML; MG/100ML
9 INJECTION, SOLUTION INTRAVENOUS CONTINUOUS
Status: DISCONTINUED | OUTPATIENT
Start: 2022-01-01 | End: 2022-01-01

## 2022-01-01 RX ORDER — HYDROMORPHONE HYDROCHLORIDE 1 MG/ML
0.5 INJECTION, SOLUTION INTRAMUSCULAR; INTRAVENOUS; SUBCUTANEOUS
Status: DISCONTINUED | OUTPATIENT
Start: 2022-01-01 | End: 2022-01-01 | Stop reason: HOSPADM

## 2022-01-01 RX ORDER — LIDOCAINE HYDROCHLORIDE 20 MG/ML
INJECTION, SOLUTION INFILTRATION; PERINEURAL AS NEEDED
Status: DISCONTINUED | OUTPATIENT
Start: 2022-01-01 | End: 2022-01-01 | Stop reason: SURG

## 2022-01-01 RX ORDER — ENALAPRILAT 2.5 MG/2ML
1.25 INJECTION INTRAVENOUS EVERY 6 HOURS
Status: DISCONTINUED | OUTPATIENT
Start: 2022-01-01 | End: 2022-01-01

## 2022-01-01 RX ORDER — LIDOCAINE HYDROCHLORIDE 10 MG/ML
0.5 INJECTION, SOLUTION EPIDURAL; INFILTRATION; INTRACAUDAL; PERINEURAL ONCE AS NEEDED
Status: DISCONTINUED | OUTPATIENT
Start: 2022-01-01 | End: 2022-01-01 | Stop reason: HOSPADM

## 2022-01-01 RX ORDER — CLOPIDOGREL BISULFATE 75 MG/1
75 TABLET ORAL DAILY
Qty: 90 TABLET | Refills: 1 | Status: SHIPPED | OUTPATIENT
Start: 2022-01-01 | End: 2022-01-01

## 2022-01-01 RX ORDER — SODIUM CHLORIDE 0.9 % (FLUSH) 0.9 %
10 SYRINGE (ML) INJECTION AS NEEDED
Status: DISCONTINUED | OUTPATIENT
Start: 2022-01-01 | End: 2023-01-01 | Stop reason: HOSPADM

## 2022-01-01 RX ORDER — LIDOCAINE HYDROCHLORIDE 20 MG/ML
JELLY TOPICAL ONCE
Status: COMPLETED | OUTPATIENT
Start: 2022-01-01 | End: 2022-01-01

## 2022-01-01 RX ORDER — PROMETHAZINE HYDROCHLORIDE 25 MG/1
25 SUPPOSITORY RECTAL ONCE AS NEEDED
Status: DISCONTINUED | OUTPATIENT
Start: 2022-01-01 | End: 2022-01-01 | Stop reason: HOSPADM

## 2022-01-01 RX ORDER — AZELASTINE 1 MG/ML
SPRAY, METERED NASAL
COMMUNITY
Start: 2022-01-01

## 2022-01-01 RX ORDER — HYDRALAZINE HYDROCHLORIDE 20 MG/ML
5 INJECTION INTRAMUSCULAR; INTRAVENOUS
Status: DISCONTINUED | OUTPATIENT
Start: 2022-01-01 | End: 2022-01-01 | Stop reason: HOSPADM

## 2022-01-01 RX ORDER — LABETALOL HYDROCHLORIDE 5 MG/ML
20 INJECTION, SOLUTION INTRAVENOUS
Status: DISCONTINUED | OUTPATIENT
Start: 2022-01-01 | End: 2022-01-01

## 2022-01-01 RX ORDER — ONDANSETRON 2 MG/ML
INJECTION INTRAMUSCULAR; INTRAVENOUS AS NEEDED
Status: DISCONTINUED | OUTPATIENT
Start: 2022-01-01 | End: 2022-01-01 | Stop reason: SURG

## 2022-01-01 RX ORDER — LABETALOL HYDROCHLORIDE 5 MG/ML
5 INJECTION, SOLUTION INTRAVENOUS
Status: DISCONTINUED | OUTPATIENT
Start: 2022-01-01 | End: 2022-01-01 | Stop reason: HOSPADM

## 2022-01-01 RX ORDER — FENTANYL CITRATE 50 UG/ML
50 INJECTION, SOLUTION INTRAMUSCULAR; INTRAVENOUS
Status: DISCONTINUED | OUTPATIENT
Start: 2022-01-01 | End: 2022-01-01 | Stop reason: HOSPADM

## 2022-01-01 RX ORDER — OLANZAPINE 5 MG/1
5 TABLET ORAL NIGHTLY PRN
Status: DISCONTINUED | OUTPATIENT
Start: 2022-01-01 | End: 2022-01-01

## 2022-01-01 RX ORDER — EPHEDRINE SULFATE 50 MG/ML
5 INJECTION, SOLUTION INTRAVENOUS ONCE AS NEEDED
Status: DISCONTINUED | OUTPATIENT
Start: 2022-01-01 | End: 2022-01-01 | Stop reason: HOSPADM

## 2022-01-01 RX ORDER — DIGOXIN 250 MCG
250 TABLET ORAL DAILY
Status: DISCONTINUED | OUTPATIENT
Start: 2022-01-01 | End: 2022-01-01

## 2022-01-01 RX ORDER — LABETALOL HYDROCHLORIDE 5 MG/ML
20-80 INJECTION, SOLUTION INTRAVENOUS
Status: DISCONTINUED | OUTPATIENT
Start: 2022-01-01 | End: 2023-01-01 | Stop reason: HOSPADM

## 2022-01-01 RX ORDER — DEXMEDETOMIDINE HYDROCHLORIDE 4 UG/ML
.2-1.5 INJECTION, SOLUTION INTRAVENOUS
Status: DISCONTINUED | OUTPATIENT
Start: 2022-01-01 | End: 2022-01-01

## 2022-01-01 RX ORDER — FENTANYL CITRATE 50 UG/ML
INJECTION, SOLUTION INTRAMUSCULAR; INTRAVENOUS AS NEEDED
Status: DISCONTINUED | OUTPATIENT
Start: 2022-01-01 | End: 2022-01-01 | Stop reason: SURG

## 2022-01-01 RX ORDER — NALOXONE HCL 0.4 MG/ML
0.2 VIAL (ML) INJECTION AS NEEDED
Status: DISCONTINUED | OUTPATIENT
Start: 2022-01-01 | End: 2022-01-01 | Stop reason: HOSPADM

## 2022-01-01 RX ORDER — DOCUSATE SODIUM 100 MG/1
100 CAPSULE, LIQUID FILLED ORAL 2 TIMES DAILY PRN
Status: DISCONTINUED | OUTPATIENT
Start: 2022-01-01 | End: 2022-01-01 | Stop reason: CLARIF

## 2022-01-01 RX ORDER — LEVETIRACETAM 500 MG/5ML
500 INJECTION, SOLUTION, CONCENTRATE INTRAVENOUS EVERY 12 HOURS SCHEDULED
Status: DISCONTINUED | OUTPATIENT
Start: 2022-01-01 | End: 2023-01-01 | Stop reason: HOSPADM

## 2022-01-01 RX ORDER — HYDROCODONE BITARTRATE AND ACETAMINOPHEN 7.5; 325 MG/1; MG/1
1 TABLET ORAL ONCE AS NEEDED
Status: DISCONTINUED | OUTPATIENT
Start: 2022-01-01 | End: 2022-01-01 | Stop reason: HOSPADM

## 2022-01-01 RX ORDER — ROCURONIUM BROMIDE 10 MG/ML
INJECTION, SOLUTION INTRAVENOUS AS NEEDED
Status: DISCONTINUED | OUTPATIENT
Start: 2022-01-01 | End: 2022-01-01 | Stop reason: SURG

## 2022-01-01 RX ORDER — AMOXICILLIN 250 MG
1 CAPSULE ORAL NIGHTLY PRN
Status: DISCONTINUED | OUTPATIENT
Start: 2022-01-01 | End: 2023-01-01 | Stop reason: HOSPADM

## 2022-01-01 RX ORDER — ONDANSETRON 2 MG/ML
4 INJECTION INTRAMUSCULAR; INTRAVENOUS ONCE AS NEEDED
Status: DISCONTINUED | OUTPATIENT
Start: 2022-01-01 | End: 2022-01-01 | Stop reason: HOSPADM

## 2022-01-01 RX ORDER — GLYCOPYRROLATE 0.2 MG/ML
INJECTION INTRAMUSCULAR; INTRAVENOUS AS NEEDED
Status: DISCONTINUED | OUTPATIENT
Start: 2022-01-01 | End: 2022-01-01 | Stop reason: SURG

## 2022-01-01 RX ORDER — OXYCODONE AND ACETAMINOPHEN 7.5; 325 MG/1; MG/1
1 TABLET ORAL EVERY 4 HOURS PRN
Status: DISCONTINUED | OUTPATIENT
Start: 2022-01-01 | End: 2022-01-01 | Stop reason: HOSPADM

## 2022-01-01 RX ORDER — ACETAMINOPHEN 325 MG/1
650 TABLET ORAL EVERY 4 HOURS PRN
Status: DISCONTINUED | OUTPATIENT
Start: 2022-01-01 | End: 2023-01-01 | Stop reason: HOSPADM

## 2022-01-01 RX ORDER — OLANZAPINE 10 MG/1
2.5 INJECTION, POWDER, LYOPHILIZED, FOR SOLUTION INTRAMUSCULAR EVERY 12 HOURS PRN
Status: DISCONTINUED | OUTPATIENT
Start: 2022-01-01 | End: 2023-01-01 | Stop reason: HOSPADM

## 2022-01-01 RX ORDER — FAMOTIDINE 10 MG/ML
20 INJECTION, SOLUTION INTRAVENOUS ONCE
Status: COMPLETED | OUTPATIENT
Start: 2022-01-01 | End: 2022-01-01

## 2022-01-01 RX ORDER — SODIUM CHLORIDE 0.9 % (FLUSH) 0.9 %
10 SYRINGE (ML) INJECTION EVERY 12 HOURS SCHEDULED
Status: DISCONTINUED | OUTPATIENT
Start: 2022-01-01 | End: 2023-01-01 | Stop reason: HOSPADM

## 2022-01-01 RX ORDER — SODIUM CHLORIDE 0.9 % (FLUSH) 0.9 %
10 SYRINGE (ML) INJECTION EVERY 12 HOURS SCHEDULED
Status: DISCONTINUED | OUTPATIENT
Start: 2022-01-01 | End: 2022-01-01 | Stop reason: HOSPADM

## 2022-01-01 RX ORDER — LISINOPRIL 10 MG/1
10 TABLET ORAL
Status: DISCONTINUED | OUTPATIENT
Start: 2022-01-01 | End: 2023-01-01 | Stop reason: HOSPADM

## 2022-01-01 RX ORDER — PROMETHAZINE HYDROCHLORIDE 25 MG/1
25 TABLET ORAL ONCE AS NEEDED
Status: DISCONTINUED | OUTPATIENT
Start: 2022-01-01 | End: 2022-01-01 | Stop reason: HOSPADM

## 2022-01-01 RX ORDER — SODIUM CHLORIDE 9 MG/ML
100 INJECTION, SOLUTION INTRAVENOUS CONTINUOUS
Status: DISCONTINUED | OUTPATIENT
Start: 2022-01-01 | End: 2022-01-01

## 2022-01-01 RX ORDER — SODIUM CHLORIDE 9 MG/ML
40 INJECTION, SOLUTION INTRAVENOUS AS NEEDED
Status: DISCONTINUED | OUTPATIENT
Start: 2022-01-01 | End: 2023-01-01 | Stop reason: HOSPADM

## 2022-01-01 RX ORDER — ONDANSETRON 4 MG/1
4 TABLET, FILM COATED ORAL EVERY 6 HOURS PRN
Status: DISCONTINUED | OUTPATIENT
Start: 2022-01-01 | End: 2023-01-01 | Stop reason: HOSPADM

## 2022-01-01 RX ORDER — PROPOFOL 10 MG/ML
VIAL (ML) INTRAVENOUS AS NEEDED
Status: DISCONTINUED | OUTPATIENT
Start: 2022-01-01 | End: 2022-01-01 | Stop reason: SURG

## 2022-01-01 RX ORDER — DILTIAZEM HCL IN NACL,ISO-OSM 125 MG/125
5-15 PLASTIC BAG, INJECTION (ML) INTRAVENOUS
Status: DISCONTINUED | OUTPATIENT
Start: 2022-01-01 | End: 2022-01-01

## 2022-01-01 RX ORDER — DEXTROSE MONOHYDRATE 50 MG/ML
75 INJECTION, SOLUTION INTRAVENOUS CONTINUOUS
Status: DISCONTINUED | OUTPATIENT
Start: 2022-01-01 | End: 2023-01-01 | Stop reason: HOSPADM

## 2022-01-01 RX ORDER — SODIUM CHLORIDE 9 MG/ML
30 INJECTION, SOLUTION INTRAVENOUS CONTINUOUS PRN
Status: DISCONTINUED | OUTPATIENT
Start: 2022-01-01 | End: 2023-01-01 | Stop reason: HOSPADM

## 2022-01-01 RX ORDER — ATORVASTATIN CALCIUM 80 MG/1
80 TABLET, FILM COATED ORAL NIGHTLY
Qty: 90 TABLET | Refills: 1 | Status: SHIPPED | OUTPATIENT
Start: 2022-01-01

## 2022-01-01 RX ORDER — QUETIAPINE FUMARATE 50 MG/1
50 TABLET, FILM COATED ORAL NIGHTLY
Status: DISCONTINUED | OUTPATIENT
Start: 2022-01-01 | End: 2022-01-01

## 2022-01-01 RX ORDER — IPRATROPIUM BROMIDE AND ALBUTEROL SULFATE 2.5; .5 MG/3ML; MG/3ML
3 SOLUTION RESPIRATORY (INHALATION)
Status: DISCONTINUED | OUTPATIENT
Start: 2022-01-01 | End: 2023-01-01

## 2022-01-01 RX ORDER — DOCUSATE SODIUM 50 MG/5 ML
100 LIQUID (ML) ORAL 2 TIMES DAILY PRN
Status: DISCONTINUED | OUTPATIENT
Start: 2022-01-01 | End: 2023-01-01 | Stop reason: HOSPADM

## 2022-01-01 RX ORDER — SODIUM CHLORIDE AND POTASSIUM CHLORIDE 150; 450 MG/100ML; MG/100ML
75 INJECTION, SOLUTION INTRAVENOUS CONTINUOUS
Status: DISCONTINUED | OUTPATIENT
Start: 2022-01-01 | End: 2022-01-01

## 2022-01-01 RX ORDER — CEFAZOLIN SODIUM 2 G/100ML
2 INJECTION, SOLUTION INTRAVENOUS
Status: COMPLETED | OUTPATIENT
Start: 2022-01-01 | End: 2022-01-01

## 2022-01-01 RX ORDER — ONDANSETRON 2 MG/ML
4 INJECTION INTRAMUSCULAR; INTRAVENOUS EVERY 6 HOURS PRN
Status: DISCONTINUED | OUTPATIENT
Start: 2022-01-01 | End: 2023-01-01 | Stop reason: HOSPADM

## 2022-01-01 RX ORDER — HYDRALAZINE HYDROCHLORIDE 20 MG/ML
20 INJECTION INTRAMUSCULAR; INTRAVENOUS EVERY 6 HOURS PRN
Status: DISCONTINUED | OUTPATIENT
Start: 2022-01-01 | End: 2023-01-01 | Stop reason: HOSPADM

## 2022-01-01 RX ORDER — DIGOXIN 0.25 MG/ML
250 INJECTION INTRAMUSCULAR; INTRAVENOUS
Status: DISCONTINUED | OUTPATIENT
Start: 2022-01-01 | End: 2022-01-01

## 2022-01-01 RX ORDER — LIDOCAINE HYDROCHLORIDE 20 MG/ML
INJECTION, SOLUTION INTRAVENOUS AS NEEDED
Status: DISCONTINUED | OUTPATIENT
Start: 2022-01-01 | End: 2022-01-01 | Stop reason: SURG

## 2022-01-01 RX ORDER — SODIUM CHLORIDE 0.9 % (FLUSH) 0.9 %
10 SYRINGE (ML) INJECTION AS NEEDED
Status: DISCONTINUED | OUTPATIENT
Start: 2022-01-01 | End: 2022-01-01 | Stop reason: HOSPADM

## 2022-01-01 RX ORDER — OLANZAPINE 10 MG/1
5 INJECTION, POWDER, LYOPHILIZED, FOR SOLUTION INTRAMUSCULAR EVERY 8 HOURS PRN
Status: DISCONTINUED | OUTPATIENT
Start: 2022-01-01 | End: 2022-01-01

## 2022-01-01 RX ORDER — SODIUM CHLORIDE 9 MG/ML
40 INJECTION, SOLUTION INTRAVENOUS AS NEEDED
Status: DISCONTINUED | OUTPATIENT
Start: 2022-01-01 | End: 2022-01-01 | Stop reason: HOSPADM

## 2022-01-01 RX ORDER — NALOXONE HCL 0.4 MG/ML
0.4 VIAL (ML) INJECTION
Status: DISCONTINUED | OUTPATIENT
Start: 2022-01-01 | End: 2023-01-01 | Stop reason: HOSPADM

## 2022-01-01 RX ORDER — LIDOCAINE HYDROCHLORIDE AND EPINEPHRINE 10; 10 MG/ML; UG/ML
INJECTION, SOLUTION INFILTRATION; PERINEURAL AS NEEDED
Status: DISCONTINUED | OUTPATIENT
Start: 2022-01-01 | End: 2022-01-01 | Stop reason: HOSPADM

## 2022-01-01 RX ORDER — MAGNESIUM HYDROXIDE 1200 MG/15ML
LIQUID ORAL AS NEEDED
Status: DISCONTINUED | OUTPATIENT
Start: 2022-01-01 | End: 2022-01-01 | Stop reason: HOSPADM

## 2022-01-01 RX ORDER — DEXAMETHASONE SODIUM PHOSPHATE 4 MG/ML
INJECTION, SOLUTION INTRA-ARTICULAR; INTRALESIONAL; INTRAMUSCULAR; INTRAVENOUS; SOFT TISSUE AS NEEDED
Status: DISCONTINUED | OUTPATIENT
Start: 2022-01-01 | End: 2022-01-01 | Stop reason: SURG

## 2022-01-01 RX ORDER — MANNITOL 20 G/100ML
INJECTION, SOLUTION INTRAVENOUS CONTINUOUS PRN
Status: DISCONTINUED | OUTPATIENT
Start: 2022-01-01 | End: 2022-01-01 | Stop reason: SURG

## 2022-01-01 RX ORDER — DEXTROSE MONOHYDRATE 50 MG/ML
75 INJECTION, SOLUTION INTRAVENOUS CONTINUOUS
Status: DISCONTINUED | OUTPATIENT
Start: 2022-01-01 | End: 2022-01-01

## 2022-01-01 RX ORDER — SODIUM CHLORIDE AND POTASSIUM CHLORIDE 300; 900 MG/100ML; MG/100ML
100 INJECTION, SOLUTION INTRAVENOUS CONTINUOUS
Status: DISCONTINUED | OUTPATIENT
Start: 2022-01-01 | End: 2022-01-01

## 2022-01-01 RX ORDER — CHOLECALCIFEROL (VITAMIN D3) 125 MCG
5 CAPSULE ORAL NIGHTLY
Status: DISCONTINUED | OUTPATIENT
Start: 2022-01-01 | End: 2023-01-01 | Stop reason: HOSPADM

## 2022-01-01 RX ORDER — DIGOXIN 0.25 MG/ML
125 INJECTION INTRAMUSCULAR; INTRAVENOUS
Status: DISCONTINUED | OUTPATIENT
Start: 2023-01-01 | End: 2022-01-01

## 2022-01-01 RX ORDER — DIPHENHYDRAMINE HYDROCHLORIDE 50 MG/ML
12.5 INJECTION INTRAMUSCULAR; INTRAVENOUS
Status: DISCONTINUED | OUTPATIENT
Start: 2022-01-01 | End: 2022-01-01 | Stop reason: HOSPADM

## 2022-01-01 RX ORDER — MIDAZOLAM HYDROCHLORIDE 1 MG/ML
0.5 INJECTION INTRAMUSCULAR; INTRAVENOUS
Status: DISCONTINUED | OUTPATIENT
Start: 2022-01-01 | End: 2022-01-01 | Stop reason: HOSPADM

## 2022-01-01 RX ORDER — AMLODIPINE BESYLATE 10 MG/1
10 TABLET ORAL DAILY
Status: DISCONTINUED | OUTPATIENT
Start: 2022-01-01 | End: 2023-01-01 | Stop reason: HOSPADM

## 2022-01-01 RX ORDER — SODIUM CHLORIDE 0.9 % (FLUSH) 0.9 %
3-10 SYRINGE (ML) INJECTION AS NEEDED
Status: DISCONTINUED | OUTPATIENT
Start: 2022-01-01 | End: 2022-01-01 | Stop reason: HOSPADM

## 2022-01-01 RX ORDER — SODIUM CHLORIDE 0.9 % (FLUSH) 0.9 %
3 SYRINGE (ML) INJECTION EVERY 12 HOURS SCHEDULED
Status: DISCONTINUED | OUTPATIENT
Start: 2022-01-01 | End: 2022-01-01 | Stop reason: HOSPADM

## 2022-01-01 RX ORDER — SODIUM CHLORIDE, SODIUM LACTATE, POTASSIUM CHLORIDE, CALCIUM CHLORIDE 600; 310; 30; 20 MG/100ML; MG/100ML; MG/100ML; MG/100ML
INJECTION, SOLUTION INTRAVENOUS CONTINUOUS PRN
Status: DISCONTINUED | OUTPATIENT
Start: 2022-01-01 | End: 2022-01-01 | Stop reason: SURG

## 2022-01-01 RX ADMIN — Medication 100 MCG: at 17:46

## 2022-01-01 RX ADMIN — DIGOXIN 250 MCG: 250 TABLET ORAL at 16:04

## 2022-01-01 RX ADMIN — POTASSIUM CHLORIDE AND SODIUM CHLORIDE 100 ML/HR: 900; 300 INJECTION, SOLUTION INTRAVENOUS at 02:30

## 2022-01-01 RX ADMIN — METOPROLOL TARTRATE 25 MG: 25 TABLET, FILM COATED ORAL at 20:36

## 2022-01-01 RX ADMIN — Medication 5 MG: at 20:32

## 2022-01-01 RX ADMIN — LIDOCAINE HYDROCHLORIDE 40 MG: 20 INJECTION, SOLUTION INFILTRATION; PERINEURAL at 10:23

## 2022-01-01 RX ADMIN — FAMOTIDINE 20 MG: 10 INJECTION INTRAVENOUS at 23:37

## 2022-01-01 RX ADMIN — Medication 10 ML: at 08:21

## 2022-01-01 RX ADMIN — DIGOXIN 250 MCG: 0.25 INJECTION INTRAMUSCULAR; INTRAVENOUS at 11:18

## 2022-01-01 RX ADMIN — IPRATROPIUM BROMIDE AND ALBUTEROL SULFATE 3 ML: 2.5; .5 SOLUTION RESPIRATORY (INHALATION) at 10:38

## 2022-01-01 RX ADMIN — MORPHINE SULFATE 2 MG: 2 INJECTION, SOLUTION INTRAMUSCULAR; INTRAVENOUS at 21:39

## 2022-01-01 RX ADMIN — Medication 10 ML: at 09:02

## 2022-01-01 RX ADMIN — SODIUM CHLORIDE 100 ML/HR: 9 INJECTION, SOLUTION INTRAVENOUS at 13:07

## 2022-01-01 RX ADMIN — Medication 100 MCG: at 23:48

## 2022-01-01 RX ADMIN — Medication 10 ML: at 08:15

## 2022-01-01 RX ADMIN — LEVETIRACETAM 500 MG: 500 INJECTION, SOLUTION, CONCENTRATE INTRAVENOUS at 20:27

## 2022-01-01 RX ADMIN — DEXTROSE MONOHYDRATE 75 ML/HR: 50 INJECTION, SOLUTION INTRAVENOUS at 16:31

## 2022-01-01 RX ADMIN — ACETAMINOPHEN 650 MG: 325 TABLET, FILM COATED ORAL at 02:50

## 2022-01-01 RX ADMIN — ROCURONIUM BROMIDE 40 MG: 10 INJECTION, SOLUTION INTRAVENOUS at 16:40

## 2022-01-01 RX ADMIN — IPRATROPIUM BROMIDE AND ALBUTEROL SULFATE 3 ML: 2.5; .5 SOLUTION RESPIRATORY (INHALATION) at 20:15

## 2022-01-01 RX ADMIN — Medication 10 ML: at 21:34

## 2022-01-01 RX ADMIN — HYDRALAZINE HYDROCHLORIDE 20 MG: 20 INJECTION INTRAMUSCULAR; INTRAVENOUS at 02:50

## 2022-01-01 RX ADMIN — Medication 10 ML: at 09:03

## 2022-01-01 RX ADMIN — IPRATROPIUM BROMIDE AND ALBUTEROL SULFATE 3 ML: 2.5; .5 SOLUTION RESPIRATORY (INHALATION) at 20:24

## 2022-01-01 RX ADMIN — LABETALOL HYDROCHLORIDE 20 MG: 5 INJECTION, SOLUTION INTRAVENOUS at 06:16

## 2022-01-01 RX ADMIN — LEVETIRACETAM 500 MG: 500 INJECTION, SOLUTION, CONCENTRATE INTRAVENOUS at 08:08

## 2022-01-01 RX ADMIN — Medication 100 MCG: at 23:53

## 2022-01-01 RX ADMIN — IPRATROPIUM BROMIDE AND ALBUTEROL SULFATE 3 ML: 2.5; .5 SOLUTION RESPIRATORY (INHALATION) at 16:57

## 2022-01-01 RX ADMIN — IPRATROPIUM BROMIDE AND ALBUTEROL SULFATE 3 ML: 2.5; .5 SOLUTION RESPIRATORY (INHALATION) at 12:49

## 2022-01-01 RX ADMIN — GLYCOPYRROLATE 0.2 MCG: 1 INJECTION INTRAMUSCULAR; INTRAVENOUS at 18:07

## 2022-01-01 RX ADMIN — Medication 100 MCG: at 23:50

## 2022-01-01 RX ADMIN — MORPHINE SULFATE 2 MG: 2 INJECTION, SOLUTION INTRAMUSCULAR; INTRAVENOUS at 01:49

## 2022-01-01 RX ADMIN — HYDRALAZINE HYDROCHLORIDE 20 MG: 20 INJECTION INTRAMUSCULAR; INTRAVENOUS at 11:35

## 2022-01-01 RX ADMIN — HYDRALAZINE HYDROCHLORIDE 20 MG: 20 INJECTION INTRAMUSCULAR; INTRAVENOUS at 21:15

## 2022-01-01 RX ADMIN — SUGAMMADEX 200 MG: 100 INJECTION, SOLUTION INTRAVENOUS at 18:32

## 2022-01-01 RX ADMIN — METOPROLOL TARTRATE 25 MG: 25 TABLET, FILM COATED ORAL at 20:27

## 2022-01-01 RX ADMIN — LEVETIRACETAM 500 MG: 500 INJECTION, SOLUTION, CONCENTRATE INTRAVENOUS at 20:00

## 2022-01-01 RX ADMIN — LIDOCAINE HYDROCHLORIDE 40 MG: 20 INJECTION, SOLUTION INTRAVENOUS at 23:46

## 2022-01-01 RX ADMIN — Medication 10 ML: at 21:05

## 2022-01-01 RX ADMIN — DEXMEDETOMIDINE HYDROCHLORIDE 0.5 MCG/KG/HR: 4 INJECTION, SOLUTION INTRAVENOUS at 07:49

## 2022-01-01 RX ADMIN — IPRATROPIUM BROMIDE AND ALBUTEROL SULFATE 3 ML: 2.5; .5 SOLUTION RESPIRATORY (INHALATION) at 11:56

## 2022-01-01 RX ADMIN — ACETAMINOPHEN 650 MG: 325 TABLET, FILM COATED ORAL at 12:31

## 2022-01-01 RX ADMIN — IPRATROPIUM BROMIDE AND ALBUTEROL SULFATE 3 ML: 2.5; .5 SOLUTION RESPIRATORY (INHALATION) at 07:34

## 2022-01-01 RX ADMIN — Medication 10 ML: at 08:09

## 2022-01-01 RX ADMIN — CEFAZOLIN SODIUM 2 G: 2 INJECTION, SOLUTION INTRAVENOUS at 10:24

## 2022-01-01 RX ADMIN — LABETALOL HYDROCHLORIDE 20 MG: 5 INJECTION, SOLUTION INTRAVENOUS at 03:34

## 2022-01-01 RX ADMIN — DIGOXIN 250 MCG: 0.25 INJECTION INTRAMUSCULAR; INTRAVENOUS at 12:25

## 2022-01-01 RX ADMIN — SUGAMMADEX 400 MG: 100 INJECTION, SOLUTION INTRAVENOUS at 00:04

## 2022-01-01 RX ADMIN — DEXMEDETOMIDINE HYDROCHLORIDE 1 MCG/KG/HR: 4 INJECTION, SOLUTION INTRAVENOUS at 05:16

## 2022-01-01 RX ADMIN — NICARDIPINE HYDROCHLORIDE 5 MG/HR: 25 INJECTION, SOLUTION INTRAVENOUS at 22:09

## 2022-01-01 RX ADMIN — DEXMEDETOMIDINE HYDROCHLORIDE 0.4 MCG/KG/HR: 4 INJECTION, SOLUTION INTRAVENOUS at 04:09

## 2022-01-01 RX ADMIN — NICARDIPINE HYDROCHLORIDE 5 MG/HR: 25 INJECTION, SOLUTION INTRAVENOUS at 13:59

## 2022-01-01 RX ADMIN — SODIUM CHLORIDE 100 ML/HR: 9 INJECTION, SOLUTION INTRAVENOUS at 09:06

## 2022-01-01 RX ADMIN — CEFAZOLIN 2 G: 225 INJECTION, POWDER, FOR SOLUTION INTRAMUSCULAR; INTRAVENOUS at 17:05

## 2022-01-01 RX ADMIN — LABETALOL HYDROCHLORIDE 20 MG: 5 INJECTION, SOLUTION INTRAVENOUS at 16:11

## 2022-01-01 RX ADMIN — LEVETIRACETAM 500 MG: 500 INJECTION, SOLUTION, CONCENTRATE INTRAVENOUS at 20:36

## 2022-01-01 RX ADMIN — Medication 10 ML: at 21:16

## 2022-01-01 RX ADMIN — QUETIAPINE FUMARATE 50 MG: 50 TABLET, FILM COATED ORAL at 21:34

## 2022-01-01 RX ADMIN — SODIUM CHLORIDE, POTASSIUM CHLORIDE, SODIUM LACTATE AND CALCIUM CHLORIDE: 600; 310; 30; 20 INJECTION, SOLUTION INTRAVENOUS at 23:44

## 2022-01-01 RX ADMIN — DEXMEDETOMIDINE HYDROCHLORIDE 0.4 MCG/KG/HR: 4 INJECTION, SOLUTION INTRAVENOUS at 12:22

## 2022-01-01 RX ADMIN — DILTIAZEM HYDROCHLORIDE 10 MG/HR: 5 INJECTION, SOLUTION INTRAVENOUS at 09:11

## 2022-01-01 RX ADMIN — Medication 100 MCG: at 23:55

## 2022-01-01 RX ADMIN — DEXMEDETOMIDINE HYDROCHLORIDE 0.6 MCG/KG/HR: 4 INJECTION, SOLUTION INTRAVENOUS at 00:03

## 2022-01-01 RX ADMIN — Medication 10 ML: at 22:20

## 2022-01-01 RX ADMIN — IPRATROPIUM BROMIDE AND ALBUTEROL SULFATE 3 ML: 2.5; .5 SOLUTION RESPIRATORY (INHALATION) at 07:18

## 2022-01-01 RX ADMIN — LISINOPRIL 10 MG: 10 TABLET ORAL at 08:08

## 2022-01-01 RX ADMIN — DIGOXIN 250 MCG: 0.25 INJECTION INTRAMUSCULAR; INTRAVENOUS at 12:18

## 2022-01-01 RX ADMIN — NICARDIPINE HYDROCHLORIDE 7.5 MG/HR: 25 INJECTION, SOLUTION INTRAVENOUS at 18:11

## 2022-01-01 RX ADMIN — IPRATROPIUM BROMIDE AND ALBUTEROL SULFATE 3 ML: 2.5; .5 SOLUTION RESPIRATORY (INHALATION) at 19:53

## 2022-01-01 RX ADMIN — IPRATROPIUM BROMIDE AND ALBUTEROL SULFATE 3 ML: 2.5; .5 SOLUTION RESPIRATORY (INHALATION) at 20:17

## 2022-01-01 RX ADMIN — FENTANYL CITRATE 25 MCG: 50 INJECTION, SOLUTION INTRAMUSCULAR; INTRAVENOUS at 17:13

## 2022-01-01 RX ADMIN — PHENYLEPHRINE HYDROCHLORIDE 1 MCG/KG/MIN: 10 INJECTION INTRAVENOUS at 18:11

## 2022-01-01 RX ADMIN — MORPHINE SULFATE 2 MG: 2 INJECTION, SOLUTION INTRAMUSCULAR; INTRAVENOUS at 16:04

## 2022-01-01 RX ADMIN — LABETALOL HYDROCHLORIDE 80 MG: 5 INJECTION, SOLUTION INTRAVENOUS at 00:38

## 2022-01-01 RX ADMIN — IPRATROPIUM BROMIDE AND ALBUTEROL SULFATE 3 ML: 2.5; .5 SOLUTION RESPIRATORY (INHALATION) at 09:01

## 2022-01-01 RX ADMIN — HYDRALAZINE HYDROCHLORIDE 20 MG: 20 INJECTION INTRAMUSCULAR; INTRAVENOUS at 00:07

## 2022-01-01 RX ADMIN — IPRATROPIUM BROMIDE AND ALBUTEROL SULFATE 3 ML: 2.5; .5 SOLUTION RESPIRATORY (INHALATION) at 21:09

## 2022-01-01 RX ADMIN — METOPROLOL TARTRATE 25 MG: 25 TABLET, FILM COATED ORAL at 08:21

## 2022-01-01 RX ADMIN — LEVETIRACETAM 500 MG: 500 INJECTION, SOLUTION, CONCENTRATE INTRAVENOUS at 20:01

## 2022-01-01 RX ADMIN — MORPHINE SULFATE 2 MG: 2 INJECTION, SOLUTION INTRAMUSCULAR; INTRAVENOUS at 01:08

## 2022-01-01 RX ADMIN — LEVETIRACETAM 500 MG: 500 INJECTION, SOLUTION, CONCENTRATE INTRAVENOUS at 08:59

## 2022-01-01 RX ADMIN — NICARDIPINE HYDROCHLORIDE 7.5 MG/HR: 25 INJECTION, SOLUTION INTRAVENOUS at 00:01

## 2022-01-01 RX ADMIN — LISINOPRIL 10 MG: 10 TABLET ORAL at 08:21

## 2022-01-01 RX ADMIN — Medication 10 ML: at 09:38

## 2022-01-01 RX ADMIN — METOPROLOL TARTRATE 25 MG: 25 TABLET, FILM COATED ORAL at 19:41

## 2022-01-01 RX ADMIN — METOPROLOL TARTRATE 25 MG: 25 TABLET, FILM COATED ORAL at 20:32

## 2022-01-01 RX ADMIN — NICARDIPINE HYDROCHLORIDE 7.5 MG/HR: 25 INJECTION, SOLUTION INTRAVENOUS at 13:22

## 2022-01-01 RX ADMIN — DIATRIZOATE MEGLUMINE AND DIATRIZOATE SODIUM 30 ML: 600; 100 SOLUTION ORAL; RECTAL at 21:40

## 2022-01-01 RX ADMIN — LEVETIRACETAM 500 MG: 500 INJECTION, SOLUTION, CONCENTRATE INTRAVENOUS at 08:02

## 2022-01-01 RX ADMIN — DEXMEDETOMIDINE HYDROCHLORIDE 0.8 MCG/KG/HR: 4 INJECTION, SOLUTION INTRAVENOUS at 09:06

## 2022-01-01 RX ADMIN — IPRATROPIUM BROMIDE AND ALBUTEROL SULFATE 3 ML: 2.5; .5 SOLUTION RESPIRATORY (INHALATION) at 12:24

## 2022-01-01 RX ADMIN — HYDRALAZINE HYDROCHLORIDE 20 MG: 20 INJECTION INTRAMUSCULAR; INTRAVENOUS at 13:01

## 2022-01-01 RX ADMIN — NICARDIPINE HYDROCHLORIDE 5 MG/HR: 25 INJECTION, SOLUTION INTRAVENOUS at 22:14

## 2022-01-01 RX ADMIN — HYDRALAZINE HYDROCHLORIDE 20 MG: 20 INJECTION INTRAMUSCULAR; INTRAVENOUS at 18:13

## 2022-01-01 RX ADMIN — POTASSIUM CHLORIDE AND SODIUM CHLORIDE 100 ML/HR: 900; 300 INJECTION, SOLUTION INTRAVENOUS at 16:05

## 2022-01-01 RX ADMIN — LEVETIRACETAM 500 MG: 500 INJECTION, SOLUTION, CONCENTRATE INTRAVENOUS at 09:06

## 2022-01-01 RX ADMIN — LIDOCAINE HYDROCHLORIDE 1 ML: 20 JELLY TOPICAL at 16:13

## 2022-01-01 RX ADMIN — ONDANSETRON 4 MG: 2 INJECTION INTRAMUSCULAR; INTRAVENOUS at 18:18

## 2022-01-01 RX ADMIN — LEVETIRACETAM 500 MG: 500 INJECTION, SOLUTION, CONCENTRATE INTRAVENOUS at 20:52

## 2022-01-01 RX ADMIN — NICARDIPINE HYDROCHLORIDE 5 MG/HR: 25 INJECTION, SOLUTION INTRAVENOUS at 09:02

## 2022-01-01 RX ADMIN — MORPHINE SULFATE 2 MG: 2 INJECTION, SOLUTION INTRAMUSCULAR; INTRAVENOUS at 06:00

## 2022-01-01 RX ADMIN — Medication 10 ML: at 20:43

## 2022-01-01 RX ADMIN — Medication 10 ML: at 08:28

## 2022-01-01 RX ADMIN — Medication 10 ML: at 09:10

## 2022-01-01 RX ADMIN — DILTIAZEM HYDROCHLORIDE 15 MG/HR: 5 INJECTION, SOLUTION INTRAVENOUS at 22:58

## 2022-01-01 RX ADMIN — LEVETIRACETAM 500 MG: 500 INJECTION, SOLUTION, CONCENTRATE INTRAVENOUS at 21:33

## 2022-01-01 RX ADMIN — Medication 5 MG: at 21:34

## 2022-01-01 RX ADMIN — Medication 100 MCG: at 23:51

## 2022-01-01 RX ADMIN — DILTIAZEM HYDROCHLORIDE 15 MG/HR: 5 INJECTION, SOLUTION INTRAVENOUS at 08:26

## 2022-01-01 RX ADMIN — LABETALOL HYDROCHLORIDE 20 MG: 5 INJECTION, SOLUTION INTRAVENOUS at 20:52

## 2022-01-01 RX ADMIN — ROCURONIUM BROMIDE 10 MG: 10 INJECTION, SOLUTION INTRAVENOUS at 17:14

## 2022-01-01 RX ADMIN — DILTIAZEM HYDROCHLORIDE 15 MG/HR: 5 INJECTION, SOLUTION INTRAVENOUS at 03:12

## 2022-01-01 RX ADMIN — Medication 100 MCG: at 23:49

## 2022-01-01 RX ADMIN — ROCURONIUM BROMIDE 20 MG: 10 INJECTION, SOLUTION INTRAVENOUS at 18:26

## 2022-01-01 RX ADMIN — POTASSIUM CHLORIDE AND SODIUM CHLORIDE 100 ML/HR: 900; 300 INJECTION, SOLUTION INTRAVENOUS at 13:19

## 2022-01-01 RX ADMIN — HYDROMORPHONE HYDROCHLORIDE 0.5 MG: 1 INJECTION, SOLUTION INTRAMUSCULAR; INTRAVENOUS; SUBCUTANEOUS at 18:46

## 2022-01-01 RX ADMIN — LEVETIRACETAM 500 MG: 500 INJECTION, SOLUTION, CONCENTRATE INTRAVENOUS at 20:56

## 2022-01-01 RX ADMIN — NICARDIPINE HYDROCHLORIDE 5 MG/HR: 25 INJECTION, SOLUTION INTRAVENOUS at 09:06

## 2022-01-01 RX ADMIN — QUETIAPINE FUMARATE 50 MG: 50 TABLET, FILM COATED ORAL at 20:00

## 2022-01-01 RX ADMIN — METOPROLOL TARTRATE 25 MG: 25 TABLET, FILM COATED ORAL at 20:54

## 2022-01-01 RX ADMIN — AMLODIPINE BESYLATE 10 MG: 10 TABLET ORAL at 08:08

## 2022-01-01 RX ADMIN — IPRATROPIUM BROMIDE AND ALBUTEROL SULFATE 3 ML: 2.5; .5 SOLUTION RESPIRATORY (INHALATION) at 13:22

## 2022-01-01 RX ADMIN — Medication 200 MCG: at 23:59

## 2022-01-01 RX ADMIN — DEXMEDETOMIDINE HYDROCHLORIDE 0.5 MCG/KG/HR: 4 INJECTION, SOLUTION INTRAVENOUS at 16:53

## 2022-01-01 RX ADMIN — ENALAPRILAT 1.25 MG: 1.25 INJECTION INTRAVENOUS at 05:06

## 2022-01-01 RX ADMIN — IPRATROPIUM BROMIDE AND ALBUTEROL SULFATE 3 ML: 2.5; .5 SOLUTION RESPIRATORY (INHALATION) at 08:11

## 2022-01-01 RX ADMIN — Medication 10 ML: at 08:03

## 2022-01-01 RX ADMIN — Medication 5 MG: at 20:00

## 2022-01-01 RX ADMIN — POTASSIUM CHLORIDE AND SODIUM CHLORIDE 100 ML/HR: 900; 300 INJECTION, SOLUTION INTRAVENOUS at 13:02

## 2022-01-01 RX ADMIN — MORPHINE SULFATE 2 MG: 2 INJECTION, SOLUTION INTRAMUSCULAR; INTRAVENOUS at 03:36

## 2022-01-01 RX ADMIN — POTASSIUM CHLORIDE AND SODIUM CHLORIDE 75 ML/HR: 450; 150 INJECTION, SOLUTION INTRAVENOUS at 15:16

## 2022-01-01 RX ADMIN — LABETALOL HYDROCHLORIDE 20 MG: 5 INJECTION, SOLUTION INTRAVENOUS at 23:11

## 2022-01-01 RX ADMIN — MORPHINE SULFATE 2 MG: 2 INJECTION, SOLUTION INTRAMUSCULAR; INTRAVENOUS at 08:14

## 2022-01-01 RX ADMIN — DIGOXIN 250 MCG: 0.25 INJECTION INTRAMUSCULAR; INTRAVENOUS at 11:35

## 2022-01-01 RX ADMIN — MORPHINE SULFATE 2 MG: 2 INJECTION, SOLUTION INTRAMUSCULAR; INTRAVENOUS at 20:32

## 2022-01-01 RX ADMIN — DEXMEDETOMIDINE HYDROCHLORIDE 0.7 MCG/KG/HR: 4 INJECTION, SOLUTION INTRAVENOUS at 18:06

## 2022-01-01 RX ADMIN — Medication 100 MCG: at 23:54

## 2022-01-01 RX ADMIN — Medication 10 ML: at 20:36

## 2022-01-01 RX ADMIN — MANNITOL: 20 INJECTION, SOLUTION INTRAVENOUS at 17:10

## 2022-01-01 RX ADMIN — LEVETIRACETAM 500 MG: 500 INJECTION, SOLUTION, CONCENTRATE INTRAVENOUS at 21:06

## 2022-01-01 RX ADMIN — LEVETIRACETAM 500 MG: 500 INJECTION, SOLUTION, CONCENTRATE INTRAVENOUS at 08:51

## 2022-01-01 RX ADMIN — Medication 5 MG: at 22:15

## 2022-01-01 RX ADMIN — DEXMEDETOMIDINE HYDROCHLORIDE 1 MCG/KG/HR: 4 INJECTION, SOLUTION INTRAVENOUS at 12:11

## 2022-01-01 RX ADMIN — NICARDIPINE HYDROCHLORIDE 2.5 MG/HR: 25 INJECTION, SOLUTION INTRAVENOUS at 16:28

## 2022-01-01 RX ADMIN — DEXTROSE MONOHYDRATE 75 ML/HR: 50 INJECTION, SOLUTION INTRAVENOUS at 08:35

## 2022-01-01 RX ADMIN — ROCURONIUM BROMIDE 50 MG: 50 INJECTION, SOLUTION INTRAVENOUS at 23:46

## 2022-01-01 RX ADMIN — Medication 100 MCG: at 17:51

## 2022-01-01 RX ADMIN — LORAZEPAM 0.5 MG: 2 INJECTION INTRAMUSCULAR; INTRAVENOUS at 23:13

## 2022-01-01 RX ADMIN — MORPHINE SULFATE 2 MG: 2 INJECTION, SOLUTION INTRAMUSCULAR; INTRAVENOUS at 03:47

## 2022-01-01 RX ADMIN — METOPROLOL TARTRATE 25 MG: 25 TABLET, FILM COATED ORAL at 08:08

## 2022-01-01 RX ADMIN — LEVETIRACETAM 500 MG: 500 INJECTION, SOLUTION, CONCENTRATE INTRAVENOUS at 08:21

## 2022-01-01 RX ADMIN — LABETALOL HYDROCHLORIDE 40 MG: 5 INJECTION, SOLUTION INTRAVENOUS at 23:41

## 2022-01-01 RX ADMIN — LABETALOL HYDROCHLORIDE 80 MG: 5 INJECTION, SOLUTION INTRAVENOUS at 01:03

## 2022-01-01 RX ADMIN — Medication 10 ML: at 08:18

## 2022-01-01 RX ADMIN — LEVETIRACETAM 500 MG: 500 INJECTION, SOLUTION, CONCENTRATE INTRAVENOUS at 20:32

## 2022-01-01 RX ADMIN — AMLODIPINE BESYLATE 10 MG: 10 TABLET ORAL at 08:21

## 2022-01-01 RX ADMIN — DIGOXIN 250 MCG: 0.25 INJECTION INTRAMUSCULAR; INTRAVENOUS at 11:59

## 2022-01-01 RX ADMIN — Medication 200 MCG: at 23:57

## 2022-01-01 RX ADMIN — LISINOPRIL 10 MG: 10 TABLET ORAL at 15:38

## 2022-01-01 RX ADMIN — HYDRALAZINE HYDROCHLORIDE 20 MG: 20 INJECTION INTRAMUSCULAR; INTRAVENOUS at 05:21

## 2022-01-01 RX ADMIN — DEXMEDETOMIDINE HYDROCHLORIDE 0.5 MCG/KG/HR: 4 INJECTION, SOLUTION INTRAVENOUS at 14:07

## 2022-01-01 RX ADMIN — METOPROLOL TARTRATE 25 MG: 25 TABLET, FILM COATED ORAL at 08:03

## 2022-01-01 RX ADMIN — LEVETIRACETAM 500 MG: 500 INJECTION, SOLUTION, CONCENTRATE INTRAVENOUS at 20:54

## 2022-01-01 RX ADMIN — AMLODIPINE BESYLATE 10 MG: 10 TABLET ORAL at 01:03

## 2022-01-01 RX ADMIN — SODIUM CHLORIDE, POTASSIUM CHLORIDE, SODIUM LACTATE AND CALCIUM CHLORIDE 9 ML/HR: 600; 310; 30; 20 INJECTION, SOLUTION INTRAVENOUS at 16:01

## 2022-01-01 RX ADMIN — QUETIAPINE FUMARATE 50 MG: 50 TABLET, FILM COATED ORAL at 20:53

## 2022-01-01 RX ADMIN — IPRATROPIUM BROMIDE AND ALBUTEROL SULFATE 3 ML: 2.5; .5 SOLUTION RESPIRATORY (INHALATION) at 12:10

## 2022-01-01 RX ADMIN — ENALAPRILAT 1.25 MG: 1.25 INJECTION INTRAVENOUS at 13:11

## 2022-01-01 RX ADMIN — PROPOFOL 150 MCG/KG/MIN: 10 INJECTION, EMULSION INTRAVENOUS at 10:23

## 2022-01-01 RX ADMIN — PERFLUTREN 2 ML: 6.52 INJECTION, SUSPENSION INTRAVENOUS at 11:59

## 2022-01-01 RX ADMIN — DILTIAZEM HYDROCHLORIDE 5 MG/HR: 5 INJECTION, SOLUTION INTRAVENOUS at 13:50

## 2022-01-01 RX ADMIN — Medication 5 MG: at 20:53

## 2022-01-01 RX ADMIN — Medication 10 ML: at 20:01

## 2022-01-01 RX ADMIN — LEVETIRACETAM 500 MG: 500 INJECTION, SOLUTION, CONCENTRATE INTRAVENOUS at 08:31

## 2022-01-01 RX ADMIN — DEXAMETHASONE SODIUM PHOSPHATE 10 MG: 4 INJECTION, SOLUTION INTRAMUSCULAR; INTRAVENOUS at 17:02

## 2022-01-01 RX ADMIN — LEVETIRACETAM 500 MG: 500 INJECTION, SOLUTION, CONCENTRATE INTRAVENOUS at 20:44

## 2022-01-01 RX ADMIN — IPRATROPIUM BROMIDE AND ALBUTEROL SULFATE 3 ML: 2.5; .5 SOLUTION RESPIRATORY (INHALATION) at 15:30

## 2022-01-01 RX ADMIN — DEXMEDETOMIDINE HYDROCHLORIDE 0.5 MCG/KG/HR: 4 INJECTION, SOLUTION INTRAVENOUS at 01:44

## 2022-01-01 RX ADMIN — DILTIAZEM HYDROCHLORIDE 10 MG/HR: 5 INJECTION, SOLUTION INTRAVENOUS at 23:42

## 2022-01-01 RX ADMIN — SODIUM CHLORIDE, POTASSIUM CHLORIDE, SODIUM LACTATE AND CALCIUM CHLORIDE: 600; 310; 30; 20 INJECTION, SOLUTION INTRAVENOUS at 19:14

## 2022-01-01 RX ADMIN — LEVETIRACETAM 500 MG: 500 INJECTION, SOLUTION, CONCENTRATE INTRAVENOUS at 09:03

## 2022-01-01 RX ADMIN — DIGOXIN 250 MCG: 0.25 INJECTION INTRAMUSCULAR; INTRAVENOUS at 11:43

## 2022-01-01 RX ADMIN — POTASSIUM CHLORIDE AND SODIUM CHLORIDE 100 ML/HR: 900; 300 INJECTION, SOLUTION INTRAVENOUS at 23:36

## 2022-01-01 RX ADMIN — DIGOXIN 250 MCG: 0.25 INJECTION INTRAMUSCULAR; INTRAVENOUS at 12:26

## 2022-01-01 RX ADMIN — LEVETIRACETAM 500 MG: 500 INJECTION, SOLUTION, CONCENTRATE INTRAVENOUS at 08:15

## 2022-01-01 RX ADMIN — SODIUM CHLORIDE 100 ML/HR: 9 INJECTION, SOLUTION INTRAVENOUS at 22:45

## 2022-01-01 RX ADMIN — IPRATROPIUM BROMIDE AND ALBUTEROL SULFATE 3 ML: 2.5; .5 SOLUTION RESPIRATORY (INHALATION) at 16:59

## 2022-01-01 RX ADMIN — AMLODIPINE BESYLATE 10 MG: 10 TABLET ORAL at 08:59

## 2022-01-01 RX ADMIN — IPRATROPIUM BROMIDE AND ALBUTEROL SULFATE 3 ML: 2.5; .5 SOLUTION RESPIRATORY (INHALATION) at 15:41

## 2022-01-01 RX ADMIN — LABETALOL HYDROCHLORIDE 20 MG: 5 INJECTION, SOLUTION INTRAVENOUS at 18:28

## 2022-01-01 RX ADMIN — LISINOPRIL 10 MG: 10 TABLET ORAL at 08:59

## 2022-01-01 RX ADMIN — PROPOFOL 140 MG: 10 INJECTION, EMULSION INTRAVENOUS at 16:40

## 2022-01-01 RX ADMIN — FENTANYL CITRATE 25 MCG: 50 INJECTION, SOLUTION INTRAMUSCULAR; INTRAVENOUS at 16:40

## 2022-01-01 RX ADMIN — METOPROLOL TARTRATE 25 MG: 25 TABLET, FILM COATED ORAL at 21:36

## 2022-01-01 RX ADMIN — IPRATROPIUM BROMIDE AND ALBUTEROL SULFATE 3 ML: 2.5; .5 SOLUTION RESPIRATORY (INHALATION) at 16:44

## 2022-01-01 RX ADMIN — POTASSIUM CHLORIDE AND SODIUM CHLORIDE 75 ML/HR: 450; 150 INJECTION, SOLUTION INTRAVENOUS at 06:39

## 2022-01-01 RX ADMIN — IPRATROPIUM BROMIDE AND ALBUTEROL SULFATE 3 ML: 2.5; .5 SOLUTION RESPIRATORY (INHALATION) at 23:28

## 2022-01-01 RX ADMIN — ACETAMINOPHEN 650 MG: 325 TABLET, FILM COATED ORAL at 13:19

## 2022-01-01 RX ADMIN — LEVETIRACETAM 500 MG: 500 INJECTION, SOLUTION, CONCENTRATE INTRAVENOUS at 20:49

## 2022-01-01 RX ADMIN — HYDRALAZINE HYDROCHLORIDE 20 MG: 20 INJECTION INTRAMUSCULAR; INTRAVENOUS at 06:37

## 2022-01-01 RX ADMIN — Medication 10 ML: at 20:45

## 2022-01-01 RX ADMIN — POTASSIUM CHLORIDE AND SODIUM CHLORIDE 75 ML/HR: 450; 150 INJECTION, SOLUTION INTRAVENOUS at 00:31

## 2022-01-01 RX ADMIN — TAZOBACTAM SODIUM AND PIPERACILLIN SODIUM 3.38 G: 375; 3 INJECTION, SOLUTION INTRAVENOUS at 23:03

## 2022-01-01 RX ADMIN — Medication 10 ML: at 20:53

## 2022-01-01 RX ADMIN — IPRATROPIUM BROMIDE AND ALBUTEROL SULFATE 3 ML: 2.5; .5 SOLUTION RESPIRATORY (INHALATION) at 07:19

## 2022-01-01 RX ADMIN — NICARDIPINE HYDROCHLORIDE 5 MG/HR: 25 INJECTION, SOLUTION INTRAVENOUS at 19:54

## 2022-01-01 RX ADMIN — Medication 5 MG: at 20:27

## 2022-01-01 RX ADMIN — SODIUM CHLORIDE, POTASSIUM CHLORIDE, SODIUM LACTATE AND CALCIUM CHLORIDE: 600; 310; 30; 20 INJECTION, SOLUTION INTRAVENOUS at 16:33

## 2022-01-01 RX ADMIN — Medication 10 ML: at 20:28

## 2022-01-01 RX ADMIN — LABETALOL HYDROCHLORIDE 20 MG: 5 INJECTION, SOLUTION INTRAVENOUS at 14:07

## 2022-01-01 RX ADMIN — PROPOFOL 50 MG: 10 INJECTION, EMULSION INTRAVENOUS at 10:23

## 2022-01-01 RX ADMIN — Medication 100 MCG: at 23:52

## 2022-01-01 RX ADMIN — METOPROLOL TARTRATE 25 MG: 25 TABLET, FILM COATED ORAL at 09:02

## 2022-01-01 RX ADMIN — POTASSIUM CHLORIDE AND SODIUM CHLORIDE 100 ML/HR: 900; 300 INJECTION, SOLUTION INTRAVENOUS at 21:07

## 2022-01-01 RX ADMIN — LIDOCAINE HYDROCHLORIDE 80 MG: 20 INJECTION, SOLUTION INFILTRATION; PERINEURAL at 16:40

## 2022-01-01 RX ADMIN — DEXMEDETOMIDINE HYDROCHLORIDE 1.2 MCG/KG/HR: 4 INJECTION, SOLUTION INTRAVENOUS at 04:31

## 2022-01-01 RX ADMIN — PROPOFOL 100 MG: 10 INJECTION, EMULSION INTRAVENOUS at 23:46

## 2022-01-01 RX ADMIN — OLANZAPINE 5 MG: 10 INJECTION, POWDER, FOR SOLUTION INTRAMUSCULAR at 23:16

## 2022-01-01 RX ADMIN — NICARDIPINE HYDROCHLORIDE 5 MG/HR: 25 INJECTION, SOLUTION INTRAVENOUS at 04:16

## 2022-01-01 RX ADMIN — DEXMEDETOMIDINE HYDROCHLORIDE 1 MCG/KG/HR: 4 INJECTION, SOLUTION INTRAVENOUS at 00:02

## 2022-01-01 RX ADMIN — DILTIAZEM HYDROCHLORIDE 15 MG/HR: 5 INJECTION, SOLUTION INTRAVENOUS at 16:40

## 2022-01-01 RX ADMIN — METOPROLOL TARTRATE 25 MG: 25 TABLET, FILM COATED ORAL at 17:48

## 2022-01-01 RX ADMIN — METOPROLOL TARTRATE 25 MG: 25 TABLET, FILM COATED ORAL at 08:59

## 2022-01-01 RX ADMIN — PROPOFOL 60 MG: 10 INJECTION, EMULSION INTRAVENOUS at 17:18

## 2022-01-01 RX ADMIN — DIGOXIN 250 MCG: 250 TABLET ORAL at 09:02

## 2022-01-01 RX ADMIN — FAMOTIDINE 20 MG: 10 INJECTION INTRAVENOUS at 15:59

## 2022-01-01 RX ADMIN — POTASSIUM CHLORIDE AND SODIUM CHLORIDE 100 ML/HR: 900; 300 INJECTION, SOLUTION INTRAVENOUS at 09:35

## 2022-03-01 PROBLEM — Z86.73 HISTORY OF CVA (CEREBROVASCULAR ACCIDENT): Status: ACTIVE | Noted: 2022-01-01

## 2022-03-01 NOTE — PROGRESS NOTES
"Chief Complaint  Surgical Clearance    Subjective          Mingo Aquino presents to [facility] PRIMARY CARE  History of Present Illness    Patient has placed a deposit down with a oculoplastic surgeon for elective cosmetic surgery. He has a \"bag\" under his right eye that he does not like the looks of. He would like to have it fixed. It does not cause him any disability. No trouble with vision. He has a history of hypertension hyperlipidemia and previous vertebral basilar insufficiency with pontine infarction February 2021. He was placed on long-term dual antiplatelet therapy. He continues aspirin and clopidogrel. The patient states the surgeon is asked him to hold the dual antibiotic therapy for 2 weeks prior to the procedure. He was recently seen by his cardiologist for his paroxysmal SVT. A Holter monitor reportedly did not demonstrate atrial fibrillation. He feels fine otherwise. No neurovascular or cardiovascular symptoms. He states he feels the best he has in a long time.    Objective   Vital Signs:   /73   Pulse 68   Temp 97.1 °F (36.2 °C) (Temporal)   Ht 182.9 cm (72.01\")   Wt 81.1 kg (178 lb 12.8 oz)   SpO2 98%   BMI 24.24 kg/m²     Physical Exam  Vitals and nursing note reviewed.   Constitutional:       General: He is not in acute distress.     Appearance: He is well-developed.   Eyes:      Comments: From a medical standpoint his eyelids and surrounding tissue appears unremarkable. There is some slight puffiness below the right eye. No ecchymosis.   Cardiovascular:      Rate and Rhythm: Normal rate and regular rhythm.      Heart sounds: Normal heart sounds.   Pulmonary:      Effort: Pulmonary effort is normal.      Breath sounds: Normal breath sounds.   Musculoskeletal:      Cervical back: Normal range of motion.   Skin:     General: Skin is warm and dry.   Psychiatric:         Mood and Affect: Mood normal.        Result Review :   The following data was reviewed by: " Adolfo Adame MD on 03/01/2022:  Common labs    Common Labsle 10/27/21 10/27/21 12/5/21    1259 1259    Glucose 103 (A)     BUN 12     Creatinine 0.95  1.00   eGFR Non  Am 72     eGFR African Am 83     Sodium 140     Potassium 4.7     Chloride 103     Calcium 9.7     Total Protein 6.8     Albumin 4.3     Total Bilirubin 0.9     Alkaline Phosphatase 51     AST (SGOT) 21     ALT (SGPT) 21     Total Cholesterol  138    Triglycerides  110    HDL Cholesterol  44    LDL Cholesterol   74    (A) Abnormal value       Comments are available for some flowsheets but are not being displayed.                     Assessment and Plan    Diagnoses and all orders for this visit:    1. Preoperative evaluation to rule out surgical contraindication (Primary)    2. Benign essential hypertension    3. Basilar artery occlusion with cerebral infarction (HCC)    4. Paroxysmal SVT (supraventricular tachycardia) (HCC)      Preoperative evaluation for potential oculoplastic surgery. The surgery is for cosmetic reasons. The patient is not of low surgical risk, rather he is at moderate risk for complications related to stopping the aspirin and Plavix.. The surgeon is reportedly asking the patient to stop his aspirin and Plavix prior to the elective surgery. Given his previous pontine infarcts, and vertebrobasilar insufficiency, the patient is at higher than acceptable risk for recurrent CVA off the aspirin and Plavix. I cannot recommend he stop the dual antiplatelet therapy for the cosmetic procedure. The benefits do not outweigh the risks in my opinion. On my advice, the patient is considering holding off on the neck surgery. However, if he decides to go ahead with the surgery, I am also recommending cardiac consultation prior to stopping the aspirin and Plavix.      Follow Up   No follow-ups on file.  Patient was given instructions and counseling regarding his condition or for health maintenance advice. Please see specific information  pulled into the AVS if appropriate.

## 2022-03-24 NOTE — TELEPHONE ENCOUNTER
Rx Refill Note  Requested Prescriptions     Pending Prescriptions Disp Refills   • clopidogrel (PLAVIX) 75 MG tablet [Pharmacy Med Name: CLOPIDOGREL 75MG TABLETS] 90 tablet 1     Sig: TAKE 1 TABLET BY MOUTH DAILY      Last office visit with prescribing clinician: 3/1/2022      Next office visit with prescribing clinician: 5/11/2022            Mirella Bacon MA  03/24/22, 10:20 EDT

## 2022-05-11 PROBLEM — R39.9 LOWER URINARY TRACT SYMPTOMS (LUTS): Status: ACTIVE | Noted: 2022-01-01

## 2022-05-11 PROBLEM — R39.9 LOWER URINARY TRACT SYMPTOMS (LUTS): Chronic | Status: ACTIVE | Noted: 2022-01-01

## 2022-05-11 PROBLEM — Z85.46 HISTORY OF PROSTATE CANCER: Status: ACTIVE | Noted: 2022-01-01

## 2022-05-11 NOTE — PROGRESS NOTES
"Chief Complaint  Medicare Wellness-subsequent and Hyperlipidemia    Subjective          Mingo Aquino presents to [facility] PRIMARY CARE  History of Present Illness     Hypertension follow-up.  Patient continues metoprolol 25 mg twice a day.  Blood pressure here looks good.  No cardiovascular symptoms.    Cerebellar CVA.  Couple of years ago.  Remains on aspirin and Plavix long-term.    Paroxysmal supraventricular tachycardia.  No recurrent palpitations or other cardiovascular symptoms of concern he states recently.  He continues to follow with his cardiologist.  He continues on metoprolol and digoxin.    Hyperlipidemia.  He continues on atorvastatin 80 mg a day.  His LDL is now down to 50.    Objective   Vital Signs:  /83   Pulse 69   Temp 96.6 °F (35.9 °C) (Temporal)   Ht 182.9 cm (72.01\")   Wt 78.5 kg (173 lb 1.6 oz)   SpO2 97%   BMI 23.47 kg/m²     BMI is within normal parameters. No follow-up required.      Physical Exam  Vitals and nursing note reviewed.   Constitutional:       General: He is not in acute distress.     Appearance: He is well-developed. He is not ill-appearing.   HENT:      Head: Atraumatic.   Eyes:      Conjunctiva/sclera: Conjunctivae normal.   Neck:      Thyroid: No thyromegaly.   Cardiovascular:      Rate and Rhythm: Normal rate and regular rhythm.      Heart sounds: Normal heart sounds.   Pulmonary:      Effort: Pulmonary effort is normal. No respiratory distress.      Breath sounds: Normal breath sounds.   Abdominal:      General: Abdomen is flat. There is no distension.      Palpations: Abdomen is soft. There is no mass.      Tenderness: There is no abdominal tenderness. There is no guarding.      Hernia: No hernia is present.   Musculoskeletal:      Cervical back: Normal range of motion.   Lymphadenopathy:      Cervical: No cervical adenopathy.   Skin:     General: Skin is warm and dry.   Neurological:      Mental Status: He is oriented to person, " place, and time.   Psychiatric:         Behavior: Behavior normal.         Thought Content: Thought content normal.         Judgment: Judgment normal.         Result Review :     Common labs    Common Labsle 10/27/21 10/27/21 12/5/21 5/4/22 5/4/22    1259 1259  1004 1004   Glucose 103 (A)   114 (A)    BUN 12   21    Creatinine 0.95  1.00 0.88    eGFR Non African Am 72       eGFR African Am 83       Sodium 140   145 (A)    Potassium 4.7   4.5    Chloride 103   110 (A)    Calcium 9.7   9.7    Total Protein 6.8   6.3    Albumin 4.3   4.1    Total Bilirubin 0.9   0.4    Alkaline Phosphatase 51   60    AST (SGOT) 21   28    ALT (SGPT) 21   28    Total Cholesterol  138   103   Triglycerides  110   62   HDL Cholesterol  44   38 (A)   LDL Cholesterol   74   51   (A) Abnormal value       Comments are available for some flowsheets but are not being displayed.                     Assessment and Plan    Diagnoses and all orders for this visit:    1. Medicare annual wellness visit, subsequent (Primary)    2. Benign essential hypertension    3. Dyslipidemia  -     Comprehensive Metabolic Panel; Future  -     Lipid Panel; Future    4. Paroxysmal SVT (supraventricular tachycardia) (HCC)    5. Basilar artery occlusion with cerebral infarction (HCC)      Hypertension.  Well-controlled.    Hyperlipidemia in the setting of previous basilar artery occlusion and cerebral infarction.  Continues on atorvastatin aspirin and Plavix.  Also followed by cardiology.    Paroxysmal SVT.  Continues on digoxin and metoprolol.    Follow-up in 6 months for recheck with lab work prior.       Follow Up   No follow-ups on file.  Patient was given instructions and counseling regarding his condition or for health maintenance advice. Please see specific information pulled into the AVS if appropriate.

## 2022-05-11 NOTE — PROGRESS NOTES
The ABCs of the Annual Wellness Visit  Subsequent Medicare Wellness Visit    Chief Complaint   Patient presents with   • Medicare Wellness-subsequent   • Hyperlipidemia      Subjective    History of Present Illness:  Mingo Aquino is a 87 y.o. male who presents for a Subsequent Medicare Wellness Visit.    The following portions of the patient's history were reviewed and   updated as appropriate: allergies, current medications, past family history, past medical history, past social history, past surgical history and problem list.    Compared to one year ago, the patient feels his physical   health is the same.    Compared to one year ago, the patient feels his mental   health is the same.    Recent Hospitalizations:  He was not admitted to the hospital during the last year.       Current Medical Providers:  Patient Care Team:  Adolfo Adame MD as PCP - General (Family Medicine)    Outpatient Medications Prior to Visit   Medication Sig Dispense Refill   • aspirin 81 MG EC tablet Take 81 mg by mouth Daily.     • atorvastatin (LIPITOR) 80 MG tablet Take 1 tablet by mouth Every Night. 90 tablet 1   • cetirizine (zyrTEC) 10 MG tablet Take 1 tablet by mouth Daily.  5   • clopidogrel (PLAVIX) 75 MG tablet TAKE 1 TABLET BY MOUTH DAILY 90 tablet 1   • DIGOX 250 MCG tablet Take 250 mcg by mouth Daily.  3   • metoprolol tartrate (LOPRESSOR) 25 MG tablet Take 25 mg by mouth 2 (Two) Times a Day.     • Multiple Vitamin (MULTI-VITAMIN DAILY PO) Take 1 tablet by mouth Daily.     • azelastine (ASTELIN) 0.1 % nasal spray USE 1 TO 2 SPRAYS IN EACH NOSTRIL TWICE DAILY AS NEEDED FOR RUNNY NOSE OR SNEEZING OR DRAINAGE       No facility-administered medications prior to visit.       No opioid medication identified on active medication list. I have reviewed chart for other potential  high risk medication/s and harmful drug interactions in the elderly.          Aspirin is on active medication list. Aspirin use is indicated based on  "review of current medical condition/s. Pros and cons of this therapy have been discussed today. Benefits of this medication outweigh potential harm.  Patient has been encouraged to continue taking this medication.  .      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  ACP discussion was held with the patient during this visit. Patient has an advance directive in EMR which is still valid.           Objective    Vitals:    05/11/22 1349   BP: 126/83   Pulse: 69   Temp: 96.6 °F (35.9 °C)   TempSrc: Temporal   SpO2: 97%   Weight: 78.5 kg (173 lb 1.6 oz)   Height: 182.9 cm (72.01\")     BMI Readings from Last 1 Encounters:   05/11/22 23.47 kg/m²   BMI is within normal parameters. No follow-up required.    Does the patient have evidence of cognitive impairment? No    Physical Exam  Lab Results   Component Value Date    CHLPL 103 05/04/2022    TRIG 62 05/04/2022    HDL 38 (L) 05/04/2022    LDL 51 05/04/2022    VLDL 14 05/04/2022            HEALTH RISK ASSESSMENT    Smoking Status:  Social History     Tobacco Use   Smoking Status Never Smoker   Smokeless Tobacco Never Used     Alcohol Consumption:  Social History     Substance and Sexual Activity   Alcohol Use Yes    Comment: 3 drinks/week     Fall Risk Screen:    STEADI Fall Risk Assessment was completed, and patient is at LOW risk for falls.Assessment completed on:3/1/2022    Depression Screening:  PHQ-2/PHQ-9 Depression Screening " 3/1/2022   Retired PHQ-9 Total Score 0   Retired Total Score 0       Health Habits and Functional and Cognitive Screening:  Functional & Cognitive Status 5/11/2022   Do you have difficulty preparing food and eating? No   Do you have difficulty bathing yourself, getting dressed or grooming yourself? No   Do you have difficulty using the toilet? No   Do you have difficulty moving around from place to place? No   Do you have trouble with steps or getting out of a bed or a chair? No   Current Diet Well Balanced Diet   Dental Exam Up to date   Eye Exam Up to date   Exercise (times per week) 7 times per week   Current Exercises Include Yard Work;Walking   Current Exercise Activities Include -   Do you need help using the phone?  No   Are you deaf or do you have serious difficulty hearing?  No   Do you need help with transportation? No   Do you need help shopping? No   Do you need help preparing meals?  No   Do you need help with housework?  No   Do you need help with laundry? No   Do you need help taking your medications? No   Do you need help managing money? No   Do you ever drive or ride in a car without wearing a seat belt? No   Have you felt unusual stress, anger or loneliness in the last month? No   Who do you live with? Alone   If you need help, do you have trouble finding someone available to you? No   Have you been bothered in the last four weeks by sexual problems? No   Do you have difficulty concentrating, remembering or making decisions? No       Age-appropriate Screening Schedule:  Refer to the list below for future screening recommendations based on patient's age, sex and/or medical conditions. Orders for these recommended tests are listed in the plan section. The patient has been provided with a written plan.    Health Maintenance   Topic Date Due   • TDAP/TD VACCINES (1 - Tdap) Never done   • ZOSTER VACCINE (1 of 2) 05/11/2022 (Originally [birth year]/1984)   • INFLUENZA VACCINE  08/01/2022              Assessment &  Plan   CMS Preventative Services Quick Reference  Risk Factors Identified During Encounter  Immunizations Discussed/Encouraged (specific Immunizations; Shingrix   Potential cardiac risk.  The above risks/problems have been discussed with the patient.  Follow up actions/plans if indicated are seen below in the Assessment/Plan Section.  Pertinent information has been shared with the patient in the After Visit Summary.    Diagnoses and all orders for this visit:    1. Medicare annual wellness visit, subsequent (Primary)    2. Benign essential hypertension    3. Dyslipidemia  -     Comprehensive Metabolic Panel; Future  -     Lipid Panel; Future    4. Paroxysmal SVT (supraventricular tachycardia) (HCC)    5. 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     An After Visit Summary and PPPS were made available to the patient.

## 2022-07-25 NOTE — TELEPHONE ENCOUNTER
Rx Refill Note  Requested Prescriptions     Pending Prescriptions Disp Refills   • atorvastatin (LIPITOR) 80 MG tablet [Pharmacy Med Name: ATORVASTATIN 80MG TABLETS] 90 tablet 1     Sig: TAKE 1 TABLET BY MOUTH EVERY NIGHT      Last office visit with prescribing clinician: 5/11/2022      Next office visit with prescribing clinician: 11/11/2022       {TIP  Please add Last Relevant Lab Date if appropriate: 5/4/22    Shelly Jade MA  07/25/22, 12:49 EDT

## 2022-08-15 NOTE — TELEPHONE ENCOUNTER
Rx Refill Note  Requested Prescriptions     Pending Prescriptions Disp Refills   • clopidogrel (PLAVIX) 75 MG tablet [Pharmacy Med Name: CLOPIDOGREL 75MG TABLETS] 90 tablet 1     Sig: TAKE 1 TABLET BY MOUTH DAILY      Last office visit with prescribing clinician: 5/11/2022      Next office visit with prescribing clinician: 11/11/2022            Trino Burroughs  08/15/22, 12:08 EDT

## 2022-09-23 NOTE — PROGRESS NOTES
"Chief Complaint  URI (PT HAS BEEN HAVING DRAINAGE, COUGH FOR A FEW WEEKS )    Subjective        Mingo Aquino presents to [facility]  History of Present Illness    A week or 2 of sinus drainage.  Sometimes he will cough a lot of mucus up.  Thing that is all coming from postnasal drainage.  His cough he states does not feel like it is coming from deep in his chest.  He otherwise feels very good.  He states he has a lot of energy.  Is been working in his garden this week.  Keeping active.  He has had no fever no shortness of breath.  He is up-to-date with his vaccinations.  Including his omicron booster.  No wheezing.  He states the cough is a little bit worse this morning but after he took the Astelin nasal spray he felt much better.  He does not like taking nasal sprays however.    Objective   Vital Signs:  /75   Pulse 77   Temp 96.8 °F (36 °C) (Temporal)   Ht 182.9 cm (72.01\")   SpO2 97%   BMI 23.46 kg/m²   Estimated body mass index is 23.46 kg/m² as calculated from the following:    Height as of this encounter: 182.9 cm (72.01\").    Weight as of 6/7/22: 78.5 kg (173 lb).    BMI is within normal parameters. No other follow-up for BMI required.      Physical Exam  Vitals and nursing note reviewed.   Constitutional:       General: He is not in acute distress.     Comments: No acute distress.  Nontoxic.   HENT:      Right Ear: Tympanic membrane, ear canal and external ear normal.      Left Ear: Tympanic membrane, ear canal and external ear normal.      Nose: Nose normal.      Mouth/Throat:      Pharynx: No oropharyngeal exudate.   Eyes:      General: No scleral icterus.        Right eye: No discharge.         Left eye: No discharge.      Conjunctiva/sclera: Conjunctivae normal.   Cardiovascular:      Rate and Rhythm: Normal rate.   Pulmonary:      Effort: Pulmonary effort is normal. No respiratory distress.      Breath sounds: Normal breath sounds. No stridor. No " wheezing or rales.   Lymphadenopathy:      Cervical: No cervical adenopathy.   Skin:     Findings: No rash.        Result Review :                Assessment and Plan   Diagnoses and all orders for this visit:    1. Cough (Primary)  -     POCT SARS-CoV-2 Antigen MIKEY    2. Allergic rhinitis, unspecified seasonality, unspecified trigger      Allergic rhinitis and postnasal drip is the likely cause of his cough.  He is nontoxic.  No wheezing.  Likely viral syndrome.  Very likely not a bacterial sinusitis or bacterial pneumonia.  I am recommending he use the Astelin nasal spray as directed.  It seems to help the most.  He can also add some Flonase nasal spray as directed, over-the-counter.  If he is having worsening symptoms into the weekend or into next week he is going to let us know.  Otherwise I will see him back as scheduled.       Follow Up   No follow-ups on file.  Patient was given instructions and counseling regarding his condition or for health maintenance advice. Please see specific information pulled into the AVS if appropriate.

## 2022-11-11 PROBLEM — D37.9 NEOPLASM OF UNCERTAIN BEHAVIOR OF DIGESTIVE ORGAN: Status: RESOLVED | Noted: 2018-05-09 | Resolved: 2022-01-01

## 2022-11-11 NOTE — PROGRESS NOTES
"Chief Complaint  Hypertension    Subjective        Mingo Aquino presents to [facility]  History of Present Illness     Hypertension follow-up.  Blood pressures running excellent.  Continues metoprolol 25 mg twice a day.    Hyperlipidemia and basilar artery insufficiency with pontine infarct 2021.  He has occasional trouble with clumsiness and gait, but no falls or near falls and overall he feels well.  He continues on dual antiplatelet therapy with aspirin and clopidogrel.  Also he continues on atorvastatin and his lipid panel is overall favorable.    Impaired fasting glucose.  However his most recent glucose of 153 was nonfasting but he had Cheerios without sugar 2 hours earlier.  The levels does seem high for me.  His previous glucose was 114 on empty stomach.  A1c 5.7% 1 year ago.    Objective   Vital Signs:  /80   Pulse 60   Temp 97.5 °F (36.4 °C) (Temporal)   Ht 182.9 cm (72.01\")   Wt 80.7 kg (178 lb)   SpO2 98%   BMI 24.13 kg/m²   Estimated body mass index is 24.13 kg/m² as calculated from the following:    Height as of this encounter: 182.9 cm (72.01\").    Weight as of this encounter: 80.7 kg (178 lb).    BMI is within normal parameters. No other follow-up for BMI required.      Physical Exam  Vitals and nursing note reviewed.   Constitutional:       General: He is not in acute distress.     Appearance: He is well-developed.   Cardiovascular:      Rate and Rhythm: Normal rate and regular rhythm.      Heart sounds: Normal heart sounds.   Pulmonary:      Effort: Pulmonary effort is normal.      Breath sounds: Normal breath sounds.   Musculoskeletal:      Cervical back: Normal range of motion.   Skin:     General: Skin is warm and dry.   Psychiatric:         Mood and Affect: Mood normal.        Result Review :  The following data was reviewed by: Adolfo Adame MD on 11/11/2022:  Common labs    Common Labs 12/5/21 5/4/22 5/4/22 11/4/22 11/4/22     1004 1004 " 1136 1136   Glucose  114 (A)  153 (A)    BUN  21  13    Creatinine 1.00 0.88  0.95    Sodium  145 (A)  140    Potassium  4.5  4.7    Chloride  110 (A)  103    Calcium  9.7  9.9    Total Protein  6.3  6.1    Albumin  4.1  4.20    Total Bilirubin  0.4  1.0    Alkaline Phosphatase  60  58    AST (SGOT)  28  37    ALT (SGPT)  28  42 (A)    Total Cholesterol   103  117   Triglycerides   62  144   HDL Cholesterol   38 (A)  35 (A)   LDL Cholesterol    51  57   (A) Abnormal value       Comments are available for some flowsheets but are not being displayed.                     Assessment and Plan   Diagnoses and all orders for this visit:    1. Benign essential hypertension (Primary)    2. Dyslipidemia    3. Pancreatic mass    4. 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 Impaired fasting glucose  -     Hemoglobin A1c    Hypertension.  Well-controlled.  Continue metoprolol.    Impaired fasting glucose.  At risk for diabetes.  Rechecking A1c today.  If very elevated return, otherwise I will see him back in 6 months for Medicare wellness visit.    Pancreatic cystic areas.  Repeat CT scan last year revealed probable benign findings.  At this time no further work-up needed.    Basilar artery occlusion about a year and a half ago he continues on dual antiplatelet therapy with some residual neurological deficits but unremarkable exam today.    Dyslipidemia.  Continue statin.         Follow Up   No follow-ups on file.  Patient was given instructions and counseling regarding his condition or for health maintenance advice. Please see specific information pulled into the AVS if appropriate.

## 2022-12-20 PROBLEM — S06.5XAA SUBDURAL HEMATOMA: Status: ACTIVE | Noted: 2022-01-01

## 2022-12-20 NOTE — H&P
"              Patient Care Team:  Adolfo Adame MD as PCP - General (Family Medicine)    Chief complaint:  Confusion    History of present illness:  Subjective     This is an 88 year old male patient with history of stroke, on Plavix.  He presented to the hospital today for altered mental status and aphasia.    He was supposed to go with his family for lunch and they tried to contact him but they could not get all the time so his brother went to his house to check on him and found him laying in bed, incontinent and discovered that the nightstand next to the bed was broken and the lamp was on the floor.  It was unclear whether he has fallen.  Patient was not able to speak and appeared to Be confused and therefore EMS was called.  Patient is currently confused and unable to provide with history.  Most of the information was obtained from his family.    Per reports, he had an episode of severe headache described as \"the worst headache of his life\" which started last Thursday.  He called his doctor on Friday and was told that he has to go to the emergency department but instead he stayed home and started taking aspirin.    No reports of seizure.  No witnessed event of fall.    On arrival to the ED, he was hemodynamically stable.  CT of the brain showed bilateral subdural hemorrhage, worse on the left with 4 mm midline shift.      Review of Systems:  Cannot obtain due to confusion.    History  Past Medical History:   Diagnosis Date   • Arrhythmia    • Cancer (HCC)     PROSTATE   • Colon polyps 11/16/2010    cecum: hyperplastic polyp; descending colon: hyperplastic polyp; sigmoid polyp: tubular adenoma; rectal polyps x2; hyerplastic polyps   • CVA (cerebral vascular accident) (HCC)    • Diverticulosis    • Hypertension    • Stroke (HCC)      Past Surgical History:   Procedure Laterality Date   • COLONOSCOPY N/A 11/16/2010    Pancolonic diverticular disease as well as five small sessile colon polyps-Dr. Cain Rush "   • COLONOSCOPY N/A 5/23/2018    Procedure: COLONOSCOPY to CECUM;  Surgeon: Camacho Collier MD;  Location: [facility] ENDOSCOPY;  Service: Gastroenterology   • INGUINAL HERNIA REPAIR Left 07/24/2006    Open repair of left inguinal hernia with mesh-Dr. Kofi Bravo   • NASAL SINUS SURGERY Bilateral 12/11/2017    Septoplasty, submucous resection, bilateral endoscopic image-guided anterior-posterior ethmoidectomy, bilateral endoscopic image-guided sphenoidotomy, bilateral image-guided endoscopic maxillary antrostomy (with removal of tissue, right axillary sinus), bilateral endosopic image-guided nasofrontal duct dissection, left endoscopic enrique bullosa resection-Dr. Petar Luque   • PROSTATECTOMY       Family History   Problem Relation Age of Onset   • Colon cancer Mother    • Kidney cancer Sister    • Malig Hyperthermia Neg Hx      Social History     Tobacco Use   • Smoking status: Never   • Smokeless tobacco: Never   Vaping Use   • Vaping Use: Never used   Substance Use Topics   • Alcohol use: Yes     Comment: 3 drinks/week   • Drug use: No     E-cigarette/Vaping   • E-cigarette/Vaping Use Never User      E-cigarette/Vaping Substances     (Not in a hospital admission)    Allergies:  Patient has no known allergies.    Objective   Vital Signs  Temp:  [97.4 °F (36.3 °C)] 97.4 °F (36.3 °C)  Heart Rate:  [74] 74  Resp:  [18] 18  BP: (142)/(76) 142/76    PPE used per hospital policy    Physical Exam:  Constitutional: Not in acute distress.  Eyes: Injected conjunctivae, EOMI. Pupils equal and reactive to light.   ENMT: Ma 3. No oral thrush.  Moist tongue.  Neck: No thyromegaly.  Trachea midline  Heart: RRR, no murmur.  No pitting edema  Lungs: Good and equal air entry bilaterally.  Nonlabored breathing  Abdomen: Soft. No tenderness or dullness.  Positive bowel sounds  Extremities: No cyanosis, clubbing. Moves all extremities.  Warm extremities and well-perfused  Neuro: Conscious, alert, confused.  Strength 5/5 in  arms and legs.  Visual field unable to assess.  Psych: Cannot assess due to confusion   Integumentary: No rash or ecchymosis  Lymphatic: No palpable cervical or supraclavicular lymph nodes.            Diagnostic imaging:  I personally and independently reviewed the following images:   CT brain 12/20/2022: Bilateral subdural hemorrhage.  Left-to-right midline shift.    CXR 12/20/2022: Residual pulmonary filtrates bilaterally, could be chronic scarring.    Laboratory workup:          Results from last 7 days   Lab Units 12/20/22  1126   WBC 10*3/mm3 14.23*   HEMOGLOBIN g/dL 15.4   HEMATOCRIT % 43.5   PLATELETS 10*3/mm3 214     Results from last 7 days   Lab Units 12/20/22  1126   INR  1.02   APTT seconds 23.2           Assessment     1. Bilateral subdural hematoma, L>R.  Possibly traumatic secondary to anticoagulation  2. Brain swelling and midline shift  3. Encephalopathy, secondary to above  4. Stress-induced leukocytosis  5. History of stroke, on anticoagulation with Plavix  6. Hyperglycemia  7. Pulmonary infiltrates,?  Chronic scarring    Plan:  · Admit to ICU. Neurochecks every hour initially.  CT brain if change in neuro status.  · Consult neurosurgery.  Will likely require bur hole and hematoma evacuation  · Nicardipine drip to keep SBP <150  · IV hydration with normal saline 100 mL/H while n.p.o.  · Speech exam  · Aspiration precaution  · Head of bed elevation  · DVT prophylaxis with SCD    Discussed with ED provider    Dianne Kramer MD  12/20/22  12:40 EST    Time: Critical care 35 min      This note was dictated utilizing Dragon dictation

## 2022-12-20 NOTE — ANESTHESIA PREPROCEDURE EVALUATION
Anesthesia Evaluation     Patient summary reviewed and Nursing notes reviewed   no history of anesthetic complications:  NPO Solid Status: > 6 hours  NPO Liquid Status: > 4 hours           Airway   Mallampati: II  TM distance: >3 FB  Neck ROM: full  no difficulty expected and No difficulty expected  Dental - normal exam     Pulmonary - negative pulmonary ROS and normal exam    breath sounds clear to auscultation  (-) rhonchi, decreased breath sounds, wheezes, rales, stridor  Cardiovascular - normal exam    NYHA Classification: I  ECG reviewed  PT is on anticoagulation therapy  Patient on routine beta blocker  Rhythm: regular  Rate: normal    (+) hypertension, dysrhythmias PAC, Tachycardia,   (-) murmur, weak pulses, friction rub, systolic click, carotid bruits, JVD, peripheral edema      Neuro/Psych  (+) CVA,    GI/Hepatic/Renal/Endo    (+)   hepatitis B, liver disease,     Musculoskeletal (-) negative ROS    Abdominal  - normal exam    Abdomen: soft.   Substance History - negative use     OB/GYN negative ob/gyn ROS         Other      history of cancer remission                  Anesthesia Plan    ASA 4 - emergent     general     (Subdural hematomas  A-verbal    )  intravenous induction     Anesthetic plan, risks, benefits, and alternatives have been provided, discussed and informed consent has been obtained with: patient.        CODE STATUS:

## 2022-12-20 NOTE — ANESTHESIA PROCEDURE NOTES
Airway  Urgency: elective    Date/Time: 12/20/2022 4:42 PM  Airway not difficult    General Information and Staff    Patient location during procedure: OR  CRNA/CAA: Anju Lacey CRNA    Indications and Patient Condition  Indications for airway management: airway protection    Preoxygenated: yes  Mask difficulty assessment: 1 - vent by mask    Final Airway Details  Final airway type: endotracheal airway      Successful airway: ETT  Cuffed: yes   Successful intubation technique: direct laryngoscopy  Endotracheal tube insertion site: oral  Blade: Cornelia  Blade size: 4  ETT size (mm): 7.5  Cormack-Lehane Classification: grade IIb - view of arytenoids or posterior of glottis only  Placement verified by: chest auscultation and capnometry   Measured from: lips  ETT/EBT  to lips (cm): 22  Number of attempts at approach: 1  Assessment: lips, teeth, and gum same as pre-op and atraumatic intubation    Additional Comments   ett cuff up at MOP

## 2022-12-20 NOTE — ED NOTES
"Nursing report ED to floor  Mingo Aquino  88 y.o.  male    HPI :   Chief Complaint   Patient presents with    Altered Mental Status       Admitting doctor:   Dianne Kramer MD    Admitting diagnosis:   The encounter diagnosis was Subdural hematoma.    Code status:   Current Code Status       Date Active Code Status Order ID Comments User Context       Prior            Allergies:   Patient has no known allergies.    Isolation:   No active isolations    Intake and Output  No intake or output data in the 24 hours ending 12/20/22 1319    Weight:       12/20/22  1147   Weight: 80.7 kg (178 lb)       Most recent vitals:   Vitals:    12/20/22 1119 12/20/22 1122 12/20/22 1147   BP: 142/76     Pulse: 74     Resp: 18     Temp:  97.4 °F (36.3 °C)    SpO2: 97%     Weight:   80.7 kg (178 lb)   Height:   182.9 cm (72\")       Active LDAs/IV Access:   Lines, Drains & Airways       Active LDAs       Name Placement date Placement time Site Days    Peripheral IV 12/20/22 Left Antecubital 12/20/22  --  Antecubital  less than 1                    Labs (abnormal labs have a star):   Labs Reviewed   CBC WITH AUTO DIFFERENTIAL - Abnormal; Notable for the following components:       Result Value    WBC 14.23 (*)     RDW 11.9 (*)     Neutrophil % 80.3 (*)     Lymphocyte % 8.8 (*)     Eosinophil % 0.1 (*)     Immature Grans % 1.8 (*)     Neutrophils, Absolute 11.43 (*)     Monocytes, Absolute 1.17 (*)     Immature Grans, Absolute 0.25 (*)     All other components within normal limits   PROTIME-INR - Normal   APTT - Normal   RAINBOW DRAW    Narrative:     The following orders were created for panel order [place] Draw.  Procedure                               Abnormality         Status                     ---------                               -----------         ------                     Green Top (Gel)[[id number]]                                  Final result               Lavender Top[[id number]]                                     " Final result               Gold Top - SST[[id number]]                                   Final result               Light Blue Top[[id number]]                                   Final result                 Please view results for these tests on the individual orders.   COMPREHENSIVE METABOLIC PANEL   TROPONIN (IN-HOUSE)   DIGOXIN LEVEL   POCT GLUCOSE FINGERSTICK   TYPE AND SCREEN   PREPARE PLATELET PHERESIS   CBC AND DIFFERENTIAL    Narrative:     The following orders were created for panel order CBC & Differential.  Procedure                               Abnormality         Status                     ---------                               -----------         ------                     CBC Auto Differential[[id number]]        Abnormal            Final result                 Please view results for these tests on the individual orders.   GREEN TOP   LAVENDER TOP   GOLD TOP - SST   LIGHT BLUE TOP       EKG:   ECG 12 Lead Stroke Evaluation   Preliminary Result   HEART RATE= 67  bpm   RR Interval= 896  ms   WA Interval= 265  ms   P Horizontal Axis= -17  deg   P Front Axis= 54  deg   QRSD Interval= 95  ms   QT Interval= 391  ms   QRS Axis= 39  deg   T Wave Axis= 59  deg   - ABNORMAL ECG -   Sinus rhythm   Prolonged WA interval   Borderline repolarization abnormality   Electronically Signed By:    Date and Time of Study: 2022-12-20 11:51:28          Meds given in ED:   Medications   sodium chloride 0.9 % flush 10 mL (has no administration in time range)       Imaging results:  XR Chest 1 View    Result Date: 12/20/2022  1. Minimal atelectasis, scar or developing pneumonia of the right lung base. 2. No other significant findings are noted.  This report was finalized on 12/20/2022 12:08 PM by Dr. Mark Salas M.D.      CT Head Without Contrast Stroke Protocol    Result Date: 12/20/2022   There are subdural hematomas appreciated anterior to the right frontal lobe as well as lateral to the left cerebral hemisphere wrapping  around the medial aspect of the anterior portion of the left frontal lobe. There are some admixed areas of fluid density within these subdural hematomas which may represent superimposed acute subdural hygroma, non-coagulated subdural hematoma, or relatively small areas of admixed chronic subdural hematoma. The collection lateral to the left cerebral hemisphere measures up to 16 mm in diameter lateral to the left frontal lobe and the collection anterior to the right frontal lobe measures up to approximately 8 mm in diameter. There are additional subdural hygromas that are age indeterminate noted along the medial aspect of the right frontal and parietal lobes as well as the lateral aspect of the right frontal and parietal lobes that are new when compared to the prior CT scan of the head dated 02/24/2021. Additionally, there is a new age-indeterminate extra-axial collection along the inferior aspect of the left tentorium cerebelli. There is prominent mass effect with ventricular and sulcal effacement as well as shift of the midline structures to the right by approximately 5 mm.  Prominent changes of inflammatory paranasal sinus disease are incidentally noted as discussed in detail above. Additionally, postsurgical changes are seen within the paranasal sinuses.  These findings were discussed with Deyvi Mcconnell on 12/20/2022 at approximately 11:30 AM.  Radiation dose reduction techniques were utilized, including automated exposure control and exposure modulation based on body size.  This report was finalized on 12/20/2022 12:31 PM by Dr. Umang Contreras M.D.       Ambulatory status:   - BEDREST    Social issues:   Social History     Socioeconomic History    Marital status: Single   Tobacco Use    Smoking status: Never    Smokeless tobacco: Never   Vaping Use    Vaping Use: Never used   Substance and Sexual Activity    Alcohol use: Yes     Comment: 3 drinks/week    Drug use: No    Sexual activity: Defer       NIH Stroke  Scale:         Jenny Nino RN  12/20/22 13:19 EST

## 2022-12-20 NOTE — ED PROVIDER NOTES
EMERGENCY DEPARTMENT ENCOUNTER    Room Number:  17/17  Date of encounter:  12/20/2022  PCP: Adolfo Adame MD  Patient Care Team:  Adolfo Adame MD as PCP - General (Family Medicine)   Historian: EMS    HPI:  Chief Complaint: Possible stroke  A complete HPI/ROS/PMH/PSH/SH/FH are unobtainable due to: Expressive aphasia    Context: Mingo Aquino is a 88 y.o. male who presents to the ED c/o having a possible stroke.  They report that he was last spoken to when he was normal yesterday evening at 8 PM.  He had plans to go to lunch with friends today.  Friend stated that he did not answer the phone so they came to check on him and they found him standing next to the bed naked urinating on the floor.  EMS reports that his bedside table was crushed and there was a dent in the wall.  It is unclear how this occurred.  He had told friends that 1 week ago he had the worst headache of his life but he did not go to the hospital at that time.  He is not able to provide much history due to his expressive aphasia.  He will say yes or no but cannot form any other words.  Everything else comes out as word salad.    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    PAST SURGICAL HISTORY  Past Surgical History:   Procedure Laterality Date   • COLONOSCOPY N/A 11/16/2010    Pancolonic diverticular disease as well as five small sessile colon polyps-Dr. Cain Rush   • COLONOSCOPY N/A 5/23/2018    Procedure: COLONOSCOPY to CECUM;  Surgeon: Camacho Collier MD;  Location: [facility] ENDOSCOPY;  Service: Gastroenterology   • INGUINAL HERNIA REPAIR Left 07/24/2006    Open repair of left inguinal hernia with mesh-Dr. Kofi Bravo   • NASAL SINUS SURGERY Bilateral 12/11/2017    Septoplasty, submucous resection, bilateral endoscopic image-guided anterior-posterior ethmoidectomy, bilateral endoscopic image-guided sphenoidotomy, bilateral image-guided endoscopic maxillary antrostomy (with removal of tissue, right axillary sinus), bilateral endosopic image-guided nasofrontal duct dissection, left endoscopic enrique bullosa resection-Dr. Petar Luque   • PROSTATECTOMY           FAMILY HISTORY  Family History   Problem Relation Age of Onset   • Colon cancer Mother    • Kidney cancer Sister    • Malig Hyperthermia Neg Hx          SOCIAL HISTORY  Social History     Socioeconomic History   • Marital status: Single   Tobacco Use   • Smoking status: Never   • Smokeless tobacco: Never   Vaping Use   • Vaping Use: Never used   Substance and Sexual Activity   • Alcohol use: Yes     Comment: 3 drinks/week   • Drug use: No   • Sexual activity: Defer         ALLERGIES  Patient has no known allergies.        REVIEW OF SYSTEMS  Review of Systems   Not available due to expressive aphasia, hard of hearing  All systems  reviewed and negative except for those discussed in HPI.       PHYSICAL EXAM    I have reviewed the triage vital signs and nursing notes.    ED Triage Vitals   Temp Heart Rate Resp BP SpO2   12/20/22 1122 12/20/22 1119 12/20/22 1119 12/20/22 1119 12/20/22 1119   97.4 °F (36.3 °C) 74 18 142/76 97 %      Temp src Heart Rate Source Patient Position BP Location FiO2 (%)   -- -- -- -- --              Physical Exam  GENERAL: Awake, alert, no acute distress  SKIN: Warm, dry  HENT: Normocephalic, atraumatic  EYES: no scleral icterus  CV: regular rhythm, regular rate  RESPIRATORY: normal effort, lungs clear  ABDOMEN: soft, nontender, nondistended  MUSCULOSKELETAL: no deformity  NEURO: Alert, difficult neurology exam given his expressive aphasia and being severely hard of hearing.  He has equal upper extremity strength.  He has equal lower extremity strength.  He has a slight right-sided facial droop.  He has expressive aphasia.       1a. Level of Consciousness: 1-->Not alert, but arousable by minor stimulation to obey, answer, or respond  1b. LOC Questions: 2-->Answers neither question correctly  1c. LOC Commands: 0-->Performs both tasks correctly  2. Best Gaze: 0-->Normal  3. Visual: 0-->No visual loss  4. Facial Palsy: 0-->Normal symmetrical movements  5a. Motor Arm, Left: 0-->No drift, limb holds 90 (or 45) degrees for full 10 secs  5b. Motor Arm, Right: 0-->No drift, limb holds 90 (or 45) degrees for full 10 secs  6a. Motor Leg, Left: 0-->No drift, leg holds 30 degree position for full 5 secs  6b. Motor Leg, Right: 0-->No drift, leg holds 30 degree position for full 5 secs  7. Limb Ataxia: 0-->Absent  8. Sensory: 0-->Normal, no sensory loss  9. Best Language: 2-->Severe aphasia, all communication is through fragmentary expression, great need for inference, questioning, and guessing by the listener. Range of information that can be exchanged is limited, listener carries burden of. . . (see row details)  10. Dysarthria:  1-->Mild-to-moderate dysarthria, patient slurs at least some words and, at worst, can be understood with some difficulty  11. Extinction and Inattention (formerly Neglect): 0-->No abnormality    Total (NIH Stroke Scale): 6        LAB RESULTS  Recent Results (from the past 24 hour(s))   Protime-INR    Collection Time: 12/20/22 11:26 AM    Specimen: Blood   Result Value Ref Range    Protime 13.5 11.7 - 14.2 Seconds    INR 1.02 0.90 - 1.10   aPTT    Collection Time: 12/20/22 11:26 AM    Specimen: Blood   Result Value Ref Range    PTT 23.2 22.7 - 35.4 seconds   Type & Screen    Collection Time: 12/20/22 11:26 AM    Specimen: Blood   Result Value Ref Range    ABO Type O     RH type Positive     Antibody Screen Negative     T&S Expiration Date 12/23/2022 11:59:59 PM    Green Top (Gel)    Collection Time: 12/20/22 11:26 AM   Result Value Ref Range    Extra Tube Hold for add-ons.    Lavender Top    Collection Time: 12/20/22 11:26 AM   Result Value Ref Range    Extra Tube hold for add-on    Gold Top - SST    Collection Time: 12/20/22 11:26 AM   Result Value Ref Range    Extra Tube Hold for add-ons.    Light Blue Top    Collection Time: 12/20/22 11:26 AM   Result Value Ref Range    Extra Tube Hold for add-ons.    CBC Auto Differential    Collection Time: 12/20/22 11:26 AM    Specimen: Blood   Result Value Ref Range    WBC 14.23 (H) 3.40 - 10.80 10*3/mm3    RBC 4.87 4.14 - 5.80 10*6/mm3    Hemoglobin 15.4 13.0 - 17.7 g/dL    Hematocrit 43.5 37.5 - 51.0 %    MCV 89.3 79.0 - 97.0 fL    MCH 31.6 26.6 - 33.0 pg    MCHC 35.4 31.5 - 35.7 g/dL    RDW 11.9 (L) 12.3 - 15.4 %    RDW-SD 39.0 37.0 - 54.0 fl    MPV 9.9 6.0 - 12.0 fL    Platelets 214 140 - 450 10*3/mm3    Neutrophil % 80.3 (H) 42.7 - 76.0 %    Lymphocyte % 8.8 (L) 19.6 - 45.3 %    Monocyte % 8.2 5.0 - 12.0 %    Eosinophil % 0.1 (L) 0.3 - 6.2 %    Basophil % 0.8 0.0 - 1.5 %    Immature Grans % 1.8 (H) 0.0 - 0.5 %    Neutrophils, Absolute 11.43 (H) 1.70 - 7.00 10*3/mm3     Lymphocytes, Absolute 1.25 0.70 - 3.10 10*3/mm3    Monocytes, Absolute 1.17 (H) 0.10 - 0.90 10*3/mm3    Eosinophils, Absolute 0.01 0.00 - 0.40 10*3/mm3    Basophils, Absolute 0.12 0.00 - 0.20 10*3/mm3    Immature Grans, Absolute 0.25 (H) 0.00 - 0.05 10*3/mm3    nRBC 0.0 0.0 - 0.2 /100 WBC   ECG 12 Lead Stroke Evaluation    Collection Time: 12/20/22 11:51 AM   Result Value Ref Range    QT Interval 391 ms       Ordered the above labs and independently reviewed the results.        RADIOLOGY  XR Chest 1 View    Result Date: 12/20/2022  XR CHEST 1 VW-  12/20/2022  HISTORY: Stroke protocol.  Heart size is within normal limits. Some minimal increased density in the right lung base which may represent some minimal atelectasis, scar or developing pneumonia. Right upper lung and left lung appear relatively clear.      1. Minimal atelectasis, scar or developing pneumonia of the right lung base. 2. No other significant findings are noted.  This report was finalized on 12/20/2022 12:08 PM by Dr. Makr Salas M.D.      CT Head Without Contrast Stroke Protocol    Result Date: 12/20/2022  CT HEAD WITHOUT CONTRAST  CLINICAL HISTORY: Aphasia and dysarthria.  TECHNIQUE: CT scan of the head was obtained with 3 mm axial soft tissue algorithm images. No intravenous contrast was administered. Sagittal and coronal reconstructions were obtained.  COMPARISON: CT scan of the head dated 02/24/2021.  FINDINGS:   There is a large acute subdural hematoma appreciated lateral to the left frontal, parietal, temporal, and occipital lobes. There are admixed areas of hypodensity which may represent foci of acute subdural hygroma, non-coagulated subdural hematoma, or relatively small areas of admixed chronic subdural hematoma. Overall, the subdural collection measures up to 16 mm in diameter lateral to the left frontal lobe. Additionally, there is a subdural collection noted anterior to the right frontal lobe which contains acute blood products  that measures up to approximately 8 mm in diameter. However, there is also a CSF density component that could represent a superimposed acute subdural hygroma, non-coagulated subdural hematoma, or relatively small areas of chronic subdural hematoma. There is a subdural hygroma that is age-indeterminate along the medial aspect of the right frontal and parietal lobes that measures up to approximately 6 mm in diameter. Also, there is an age-indeterminate subdural hygroma noted lateral to the right frontal and parietal lobes which measures up to approximately 4-5 mm in diameter. Some acute blood products are noted along the medial aspect of the left frontal lobe along the left aspect of the falx cerebri measuring up to approximately 6 mm in diameter. Of note, the aforementioned subdural hygromas are new when compared to the prior CT scan of the head dated 02/24/2021. There is prominent mass effect with sulcal and ventricular effacement in addition to shift of the midline structures to the right by approximately 5 mm.  Infratentorially, there is a 4 mm in width extra-axial collection that is new when compared to the prior CT scan of the head consistent with an age-indeterminate subdural collection.  Otherwise, the ventricles, sulci, and cisterns are age appropriate. The gray-white matter differentiation is otherwise unremarkable. The basal ganglia and thalami are unremarkable in appearance.  The extracranial structures are remarkable for mucosal thickening within the maxillary sinuses. Inspissated secretions are additionally noted within the maxillary sinuses. Mucosal thickening is also identified within the frontal sinus, ethmoid air cells, and sphenoid sinus. Postsurgical changes are also seen within the paranasal sinuses.       There are subdural hematomas appreciated anterior to the right frontal lobe as well as lateral to the left cerebral hemisphere wrapping around the medial aspect of the anterior portion of the  left frontal lobe. There are some admixed areas of fluid density within these subdural hematomas which may represent superimposed acute subdural hygroma, non-coagulated subdural hematoma, or relatively small areas of admixed chronic subdural hematoma. The collection lateral to the left cerebral hemisphere measures up to 16 mm in diameter lateral to the left frontal lobe and the collection anterior to the right frontal lobe measures up to approximately 8 mm in diameter. There are additional subdural hygromas that are age indeterminate noted along the medial aspect of the right frontal and parietal lobes as well as the lateral aspect of the right frontal and parietal lobes that are new when compared to the prior CT scan of the head dated 02/24/2021. Additionally, there is a new age-indeterminate extra-axial collection along the inferior aspect of the left tentorium cerebelli. There is prominent mass effect with ventricular and sulcal effacement as well as shift of the midline structures to the right by approximately 5 mm.  Prominent changes of inflammatory paranasal sinus disease are incidentally noted as discussed in detail above. Additionally, postsurgical changes are seen within the paranasal sinuses.  These findings were discussed with Deyvi Mcconnell on 12/20/2022 at approximately 11:30 AM.  Radiation dose reduction techniques were utilized, including automated exposure control and exposure modulation based on body size.  This report was finalized on 12/20/2022 12:31 PM by Dr. Umang Contreras M.D.        I ordered the above noted radiological studies. Reviewed by me and discussed with radiologist.  See dictation for official radiology interpretation.      PROCEDURES    Critical Care  Performed by: Deyvi Mcconnell MD  Authorized by: Deyvi Mcconnell MD     Critical care provider statement:     Critical care time (minutes): 30-74.    Critical care time was exclusive of:  Separately billable procedures and treating  other patients    Critical care was necessary to treat or prevent imminent or life-threatening deterioration of the following conditions:  CNS failure or compromise    Critical care was time spent personally by me on the following activities:  Development of treatment plan with patient or surrogate, discussions with consultants, evaluation of patient's response to treatment, obtaining history from patient or surrogate, examination of patient, ordering and performing treatments and interventions, ordering and review of laboratory studies, ordering and review of radiographic studies, pulse oximetry, re-evaluation of patient's condition and review of old charts          MEDICATIONS GIVEN IN ER    Medications   sodium chloride 0.9 % flush 10 mL (has no administration in time range)         PROGRESS, DATA ANALYSIS, CONSULTS, AND MEDICAL DECISION MAKING    All labs have been independently reviewed by me.  All radiology studies have been reviewed by me and discussed with radiologist dictating the report.   EKG's independently viewed and interpreted by me.  Discussion below represents my analysis of pertinent findings related to patient's condition, differential diagnosis, treatment plan and final disposition.    Differential diagnosis includes but is not limited to intracranial hemorrhage, subdural hematoma, stroke, seizure.    ED Course as of 12/20/22 1307   Tue Dec 20, 2022   1122 Speaking with Dr. Rodriguez with stroke neurology.  Initiating Team D process. [TR]   1134 Speaking with radiologist by phone.  CT of the head shows bilateral subdural hematomas.  On the left is 1.6 cm.  On the right is 8 mm.  There is midline shift to the right by 5 mm. [TR]   1153 EKG          EKG time: 1151  Rhythm/Rate: Normal sinus, rate 67  P waves and CO: Normal P, normal CO  QRS, axis: Narrow QRS, normal axis  ST and T waves: No acute    Interpreted Contemporaneously by me, independently viewed  Not significantly changed compared to prior  2/24/2021   [TR]   1157 Speaking with the patient's friend at the bedside who is power of .  He reports that the patient had a severe headache on Thursday and has been taking aspirin to help dull the pain since then.  The patient is a full code.  I updated him on the current status, plan for neurosurgery consult, plan for admission likely to the ICU. [TR]   1240 Speaking with Dr. Kramer with ICU.  Will admit. [TR]   1250 Speaking with Dr. Mccullough with neurosurgery.  He will come to evaluate. [TR]   1307 Neurosurgery is going to take the patient to surgery.  They request 2 units of platelet transfusion. [TR]      ED Course User Index  [TR] Deyvi Mcconnell MD           PPE: The patient wore a mask and I wore an N95 mask throughout the entire patient encounter.       AS OF 13:07 EST VITALS:    BP - 142/76  HR - 74  TEMP - 97.4 °F (36.3 °C)  O2 SATS - 97%        DIAGNOSIS  Final diagnoses:   Subdural hematoma         DISPOSITION  ED Disposition     ED Disposition   Decision to Admit    Condition   --    Comment   Level of Care: Critical Care [6]   Diagnosis: Subdural hematoma [[id number]]   Admitting Physician: JUANITA KRAMER [[id number]]   Attending Physician: JUANITA KRAMER [[id number]]   Certification: I Certify That Inpatient Hospital Services Are Medically Necessary For Greater Than 2 Midnights                  Note Disclaimer: At [facility], we believe that sharing information builds trust and better relationships. You are receiving this note because you recently visited [facility]. It is possible you will see health information before a provider has talked with you about it. This kind of information can be easy to misunderstand. To help you fully understand what it means for your health, we urge you to discuss this note with your provider.       Deyvi Mcconnell MD  12/20/22 3737       Deyvi Mcconnell MD  12/20/22 [id number]

## 2022-12-20 NOTE — PLAN OF CARE
Goal Outcome Evaluation:    Orders received for swallow evaluation. Plan is for neurosurgery to take the patient to surgery this date. SLP to follow for evaluation as appropriate. Thank you.

## 2022-12-21 NOTE — PROGRESS NOTES
Discharge Planning Assessment  [facility]     Patient Name: Mingo Aquino  MRN: [id number]  Today's Date: 12/21/2022    Admit Date: 12/20/2022    Plan: DC plan is undetermined  Declined referrals at this time   Discharge Needs Assessment     Row Name 12/21/22 1358       Living Environment    People in Home alone    Current Living Arrangements condominium    Potentially Unsafe Housing Conditions unable to assess    Primary Care Provided by self    Provides Primary Care For no one    Family Caregiver if Needed friend(s)    Family Caregiver Names Ramez FREITAS    Quality of Family Relationships supportive    Able to Return to Prior Arrangements yes       Resource/Environmental Concerns    Resource/Environmental Concerns home accessibility    Transportation Concerns none       Transition Planning    Patient/Family Anticipates Transition to home    Patient/Family Anticipated Services at Transition none    Transportation Anticipated family or friend will provide       Discharge Needs Assessment    Equipment Currently Used at Home none    Concerns to be Addressed discharge planning    Anticipated Changes Related to Illness none    Equipment Needed After Discharge none               Discharge Plan     Row Name 12/21/22 0126       Plan    Plan DC plan is undetermined  Declined referrals at this time    Plan Comments IMM noted. CCP spoke to patient’s friend Pat [phone]  at bedside.   CCP role explained.  Discharge planning discussed. Face sheet verified.   Pt emergency contact is his POJESSE Myrick, [phone].  Pt obtains his medications from [facility]’s pharmacy on [facility].  Pt PCP is Dr. Estuardo Adame.  Pt lives in a house alone.  Pt is independent with ADL’s.  He uses no DME to ambulate.  He has no history of rehab.  He has no past Home Health history.  Plan is undetermined.  Declines referrals until they know what his needs will be.  CCP following.              Continued Care and  Services - Admitted Since 12/20/2022    Coordination has not been started for this encounter.          Demographic Summary    No documentation.                Functional Status    No documentation.                Psychosocial    No documentation.                Abuse/Neglect    No documentation.                Legal    No documentation.                Substance Abuse    No documentation.                Patient Forms    No documentation.                   Mariella Lancaster RN

## 2022-12-21 NOTE — OP NOTE
Surgeon: Power Mccullough MD  Assistant: Zenaida Bonds,  assist      Procedure:   1.  Left-sided craniotomy for evacuation of a subdural hematoma    Preoperative diagnosis: Left-sided acute subdural hematoma with severe mass-effect and greater than 5 mm of midline shift.     Postoperative diagnosis: Same    Anesthesia: General endotracheal anesthesia      Indications/consent: The patient is an 88-year-old male brought into the emergency room and confusion.  CT head was performed which demonstrated a large left-sided convexity subdural hematoma.  There was significant mass-effect and greater than 5 mm of midline shift.  His friends were at the bedside helping provide some of his medical history.  He had no family available.  One of his friends indicated that he was his power of .  He was consented for a craniotomy for evacuation of the subdural hematoma.  The risks and benefits of the procedure were discussed with the patient and his friends including, but not limited to the risk of hemorrhage, infection, injury to surrounding structures.    Description of the procedure: The patient was brought into the operating room and a large rolled towel was placed under his left shoulder his head was then turned to the right.  His head was shaved, prepped, draped in the usual sterile fashion.  He was given grams of Ancef for antibiotic prophylaxis.  A timeout was performed and an incision was made with a 10 blade knife.  A subperiosteal dissection was performed using Bovie electrocautery.  A frontal-temporal incision was planned out starting just anterior to the ear.  The incision was carried out superiorly and posteriorly a question rhona shaped towards the anterior hairline.  The temporalis muscle was then elevated and reflected anteriorly.  4 small bur holes were made using an acorn elpidio.  A craniotome was used to elevate an 8 cm x 10 cm bone flap.  The underlying dura was opened using 11 blade knife and  Metzenbaum scissors.  There was a large acute subdural hematoma between the dura and cortical surface.  The subdural hematoma was gradually removed using a Penfield instruments, suction, and irrigation.  There was a small area underneath the dura which was found to be actively bleeding.  This was coagulated using bipolar forceps and packed with Gelfoam and thrombin.  The brain was then irrigated.  The dural flap was then closed and a large piece of DuraGen was placed over top of the dura.  The bone flap was then secured back into place using 5 titanium metal plates and several 4 mm screws.  Wound was then copiously irrigated.  The muscle was closed with 0 Vicryl sutures.  The subcutaneous tissue with 2-0 Vicryl suture.  The skin with 3-0 Monocryl.  There were no obvious complications.  Zenaida CUEVAS was the assistant who helped with this case and her assistance was greatly appreciated and necessary for the completion of the procedure.      Complications: None      Disposition: Plan to return to ICU.    Drains: None  Estimated blood loss: 100 mL

## 2022-12-21 NOTE — PLAN OF CARE
Goal Outcome Evaluation:               Discussed with RN. Patient not appropriate for swallow eval at this time. SLP to continue to follow for eval readiness.

## 2022-12-21 NOTE — PROGRESS NOTES
Pulmonary/critical care    Case reviewed patient is back from the OR status post left craniotomy with evacuation of a large left subdural hematoma with significant brain compression and midline shift.  There was a smaller right subdural hematoma.  Patient is awake he denies headache or nausea fact he denies pain.  He is extremely hard of hearing.  He is nonlabored respiration his chest is clear heart regular no murmur abdomen soft and nontender.  His oxygen saturations are 100% on 5 L nasal cannula O2 dropping to 4 L.  His pupils are equal and reactive to light they are about 3 mm.  He follow commands with his upper extremities very briskly  to both hands his  is a little weaker on the right than the left.  With the lower extremities he was a little slower to follow commands he moved his left foot more so than the right he really did not move his right until I sort of tickled his foot and I do not know how much of that was more withdrawal than actual following command.  He looks much better than expected we will continue to monitor in the ICU.

## 2022-12-21 NOTE — ANESTHESIA POSTPROCEDURE EVALUATION
Patient: Mingo Aquino    Procedure Summary     Date: 12/20/22 Room / Location: [facility] OR [address] [place] [address] MAIN OR    Anesthesia Start: 1632 Anesthesia Stop: 1932    Procedure: CRANIOTOMY FOR SUBDURAL HEMATOMA (Left: Head) Diagnosis:       Subdural hematoma      (Subdural hematoma [S06.5XAA])    Surgeons: Shawn Mccullough MD Provider: Fam Cheney MD    Anesthesia Type: general ASA Status: 4 - Emergent          Anesthesia Type: general    Vitals  Vitals Value Taken Time   /63 12/20/22 2216   Temp 37.2 °C (99 °F) 12/20/22 2200   Pulse 80 12/20/22 2226   Resp 18 12/20/22 2200   SpO2 99 % 12/20/22 2226   Vitals shown include unvalidated device data.        Post Anesthesia Care and Evaluation    Patient location during evaluation: PACU  Patient participation: complete - patient participated  Level of consciousness: awake  Pain score: 2  Pain management: adequate    Airway patency: patent  Anesthetic complications: No anesthetic complications  PONV Status: none  Cardiovascular status: acceptable  Respiratory status: acceptable  Hydration status: acceptable

## 2022-12-21 NOTE — PROGRESS NOTES
[facility] NEUROSURGERY PROGRESS NOTE    PATIENT IDENTIFICATION:   Name:  Mingo Aquino      MRN:  [id number]     88 y.o.  male               CC: POD#1 left craniotomy with evacuation of large left subdural hematoma with significant brain compression and midline shift.      Subjective     Interval History: No events reported overnight.  He is confused, although able to wiggle toes and follow some commands.    ROS:  Constitutional: No fever, chills  HEENT: No headache, no vision changes, hearing difficulties  GI: No nausea, vomiting, no swallow difficulties  Neuro: Generalized weakness, no speech difficulties    Objective     Vital signs in last 24 hours:  Temp:  [97.4 °F (36.3 °C)-99 °F (37.2 °C)] 97.4 °F (36.3 °C)  Heart Rate:  [] 76  Resp:  [18] 18  BP: ()/() 133/68    Intake/Output this shift:  No intake/output data recorded.    Intake/Output last 3 shifts:  I/O last 3 completed shifts:  In: 2411.3 [I.V.:2161.3; IV Piggyback:250]  Out: 495 [Urine:495]    LABS:  Results from last 7 days   Lab Units 12/21/22  0349 12/20/22  1126   WBC 10*3/mm3 19.72* 14.23*   HEMOGLOBIN g/dL 12.4* 15.4   HEMATOCRIT % 36.5* 43.5   PLATELETS 10*3/mm3 258 214     Results from last 7 days   Lab Units 12/21/22  0349 12/20/22  1214   SODIUM mmol/L 139 140   POTASSIUM mmol/L 3.7 4.6   CHLORIDE mmol/L 104 104   CO2 mmol/L 19.1* 24.6   BUN mg/dL 19 13   CREATININE mg/dL 0.97 0.88   CALCIUM mg/dL 9.1 9.4   BILIRUBIN mg/dL  --  0.8   ALK PHOS U/L  --  58   ALT (SGPT) U/L  --  41   AST (SGOT) U/L  --  43*   GLUCOSE mg/dL 167* 156*     Results from last 7 days   Lab Units 12/21/22  0349 12/20/22  1126   INR  1.11* 1.02         IMAGING STUDIES:  CT Head Without Contrast reveals expected changes seen on the posterior Imaging related to [place] 12/20.  Subdural hematoma is gone.Stable to slight decrease in mass-effect on the left cerebral hemisphere with left to right midline shift measuring 8 mm.  No hydrocephalus.    Result  Date: 12/21/2022  Electronically signed by Maco Browning M.D. on 12-21-22 at 0612    I personally reviewed imaging and report with Dr. Mccullough.    Meds reviewed/changed: Yes    Current Facility-Administered Medications:   •  dexmedetomidine (PRECEDEX) 400 mcg in 100 mL NS infusion, 0.2-1.5 mcg/kg/hr, Intravenous, Titrated, Dianne Kramer MD, Last Rate: 10.5 mL/hr at 12/21/22 0804, 0.5 mcg/kg/hr at 12/21/22 0804  •  docusate sodium (COLACE) capsule 100 mg, 100 mg, Oral, BID PRN, Shawn Mccullough MD  •  levETIRAcetam (KEPPRA) injection 500 mg, 500 mg, Intravenous, Q12H, Shawn Mccullough MD, 500 mg at 12/21/22 0815  •  morphine injection 2 mg, 2 mg, Intravenous, Q2H PRN, 2 mg at 12/21/22 0814 **AND** naloxone (NARCAN) injection 0.4 mg, 0.4 mg, Intravenous, Q5 Min PRN, Shawn Mccullough MD  •  niCARdipine (CARDENE) 25 mg in 250 mL NS infusion kit, 5-15 mg/hr, Intravenous, Titrated, Shawn Mccullough MD  •  niCARdipine (CARDENE) 25 mg in 250 mL NS infusion kit, 5-15 mg/hr, Intravenous, Titrated, Shawn Mccullough MD, Stopped at 12/21/22 0620  •  ondansetron (ZOFRAN) tablet 4 mg, 4 mg, Oral, Q6H PRN **OR** ondansetron (ZOFRAN) injection 4 mg, 4 mg, Intravenous, Q6H PRN, Shawn Mccullough MD  •  sennosides-docusate (PERICOLACE) 8.6-50 MG per tablet 1 tablet, 1 tablet, Oral, Nightly PRN, Shawn Mccullough MD  •  sodium chloride 0.45 % with KCl 20 mEq/L infusion, 75 mL/hr, Intravenous, Continuous, Shawn Mccullough MD, Last Rate: 75 mL/hr at 12/21/22 0031, 75 mL/hr at 12/21/22 0031  •  sodium chloride 0.9 % flush 10 mL, 10 mL, Intravenous, PRN, Shawn Mccullough MD  •  sodium chloride 0.9 % flush 10 mL, 10 mL, Intravenous, Q12H, Shawn Mccullough MD, 10 mL at 12/21/22 0815  •  sodium chloride 0.9 % flush 10 mL, 10 mL, Intravenous, PRN, Shawn Mccullough MD  •  sodium chloride 0.9 % infusion 40 mL, 40 mL, Intravenous, PRN, Shawn Mccullough MD  •  sodium chloride 0.9 % infusion, 100 mL/hr, Intravenous,  "Sadia Fitch Nathaniel, MD      Physical Exam:    General:   Awake, alert, confused.  Attempts to talk with mixed aphasia appears nonfocal, follows commands.    HEENT:    Left horseshoe incision is C/D/I  Neck:    supple  CN III IV VI: Extraocular movements are full , PERRL   CN V: Normal facial sensation and strength of muscles of mastication.  CN VII: Facial movements are symmetric. No weakness.  Motor: Normal muscle strength, bulk and tone in upper and lower extremities.  No fasciculations, rigidity, spasticity, or abnormal movements.    Sensation: Normal to light touch; no extinction  Coordination: Moving extremities  Extremities:   Wearing SCD    Assessment & Plan     ASSESSMENT:      Subdural hematoma  88-year-old male brought into the emergency room and confusion.  CT head was performed which demonstrated a large left-sided convexity subdural hematoma.  There was significant mass-effect and greater than 5 mm of midline shift.   Patient was taken to the OR for left-sided craniotomy for evacuation of subdural hematoma    PLAN:   DC Cardona cath  Head of bed at 30 degrees  Keep SBP <150  Neuro checks    I discussed the patient's findings and my recommendations with patient and Dr. Mccullough       LOS: 1 day       Niya Hughes, APRN  12/21/2022  11:24 EST    \"Dictated utilizing Dragon dictation\".      "

## 2022-12-22 NOTE — PROGRESS NOTES
[facility] NEUROSURGERY PROGRESS NOTE    PATIENT IDENTIFICATION:   Name:  Mingo Aquino      MRN:  [id number]     88 y.o.  male               CC: POD#2 left craniotomy with evacuation of large left subdural hematoma with significant brain compression and midline shift.      Subjective     Interval History: No new complaints or events overnight.  He is alert and oriented today, following commands.  In soft wrist restraints for safety.    ROS:  Constitutional: No fever, chills  HEENT: No headache, no vision changes, hearing difficulties  GI: No nausea, vomiting, no swallow difficulties  Neuro: Generalized weakness, no speech difficulties    Objective     Vital signs in last 24 hours:  Temp:  [97.6 °F (36.4 °C)-98.4 °F (36.9 °C)] 98.3 °F (36.8 °C)  Heart Rate:  [] 68  Resp:  [16] 16  BP: (117-172)/(58-95) 155/66    Intake/Output this shift:  I/O this shift:  In: 592.2 [I.V.:592.2]  Out: -     Intake/Output last 3 shifts:  I/O last 3 completed shifts:  In: 3865.3 [I.V.:3865.3]  Out: 745 [Urine:745]    LABS:  Results from last 7 days   Lab Units 12/21/22  0349 12/20/22  1126   WBC 10*3/mm3 19.72* 14.23*   HEMOGLOBIN g/dL 12.4* 15.4   HEMATOCRIT % 36.5* 43.5   PLATELETS 10*3/mm3 258 214     Results from last 7 days   Lab Units 12/21/22  0349 12/20/22  1214   SODIUM mmol/L 139 140   POTASSIUM mmol/L 3.7 4.6   CHLORIDE mmol/L 104 104   CO2 mmol/L 19.1* 24.6   BUN mg/dL 19 13   CREATININE mg/dL 0.97 0.88   CALCIUM mg/dL 9.1 9.4   BILIRUBIN mg/dL  --  0.8   ALK PHOS U/L  --  58   ALT (SGPT) U/L  --  41   AST (SGOT) U/L  --  43*   GLUCOSE mg/dL 167* 156*     Results from last 7 days   Lab Units 12/21/22  0349 12/20/22  1126   INR  1.11* 1.02         IMAGING STUDIES:  No new imaging to review.  CT head 12/21 stable.    I personally reviewed imaging and report with Dr. Mccullough.    Meds reviewed/changed: Yes      Physical Exam:    Awake, alert, oriented x3.  Speech is somewhat clear though is not aphasic  Left-sided  "scalp incision is well approximated, no erythema or drainage  Extraocular movements are full  Pupils equal round reactive to light  Normal facial sensation  Facial movements are symmetric  Normal muscle strength and bulk  Sensation normal to light touch  Moving all extremities  SCDs    Assessment & Plan     ASSESSMENT:      Subdural hematoma  88-year-old male brought into the emergency room and confusion.  CT head was performed which demonstrated a large left-sided convexity subdural hematoma.  There was significant mass-effect and greater than 5 mm of midline shift.   Patient was taken to the OR for left-sided craniotomy for evacuation of subdural hematoma    PLAN:   · Keep head of bed at 30 degrees  · Keep SBP less than 150  · Neurochecks every 2 hours  · Keppra 500 mg twice daily for 1 week      I discussed the patient's findings and my recommendations with patient and Dr. Mccullough       LOS: 2 days       Sarahy Robbins, APRN  12/22/2022  12:16 EST    \"Dictated utilizing Dragon dictation\".      "

## 2022-12-22 NOTE — PLAN OF CARE
Goal Outcome Evaluation:               Patient not appropriate for eval secondary to alertness/lethargy. SLP to follow for eval as appropriate.

## 2022-12-22 NOTE — ADDENDUM NOTE
Addendum  created 12/22/22 1574 by Cain Trent MD    Review and Sign - Ready for Procedure, Review and Sign - Signed

## 2022-12-22 NOTE — SIGNIFICANT NOTE
12/22/22 1451   OTHER   Discipline physical therapist   Rehab Time/Intention   Session Not Performed unable to evaluate, medical status change  (PT discussed w RN.  Pt continues to have elevated BP and not appropriate for OOB activities at this time.  PT will follow up tomorrow pending pt appropriatness.)   Recommendation   PT - Next Appointment 12/23/22

## 2022-12-22 NOTE — PROGRESS NOTES
"                                              LOS: 2 days   Patient Care Team:  Adolfo Adame MD as PCP - General (Family Medicine)    Chief Complaint:  F/up subdural hemorrhage s/p craniotomy ICU care medical problems listed below.    Subjective   Interval History    Remains confused.  Intermittently agitated.  Precedex drip.    Also required nicardipine IV drip briefly overnight.    On my assessment today, he was able to tell me his name only and was disoriented to place, time and situation.    No fever    REVIEW OF SYSTEMS:   Cannot obtain due to confusion.    Ventilator/Non-Invasive Ventilation Settings (From admission, onward)    None                Physical Exam:     Vital Signs  Temp:  [97.6 °F (36.4 °C)-98.4 °F (36.9 °C)] 97.6 °F (36.4 °C)  Heart Rate:  [] 68  Resp:  [15-16] 16  BP: (117-172)/(58-95) 155/66    Intake/Output Summary (Last 24 hours) at 12/22/2022 1137  Last data filed at 12/22/2022 1100  Gross per 24 hour   Intake 3096.23 ml   Output 700 ml   Net 2396.23 ml     Flowsheet Rows    Flowsheet Row First Filed Value   Admission Height 182.9 cm (72\") Documented at 12/20/2022 1147   Admission Weight 80.7 kg (178 lb) Documented at 12/20/2022 1147          PPE used per hospital policy    General Appearance:   Alert, occasionally cooperative, in no acute distress   ENMT:  Mallampati score 3, moist mucous membrane   Eyes:  Pupils equal and reactive to light. EOMI   Neck:   Trachea midline. No thyromegaly.   Lungs:    Clear to auscultation,respirations regular, even and nonlabored    Heart:   Regular rhythm and normal rate, normal S1 and S2, no         murmur   Skin:   No rash or ecchymosis   Abdomen:     Soft. No tenderness. No HSM.   Neuro/psych:  Conscious, alert, confused x3.  Intermittently cooperative.  Strength 5/5 in upper and lower  ext.    Extremities:  No cyanosis, clubbing or edema.  Warm extremities and well-perfused          Results Review:        Results from last 7 days   Lab Units " 12/21/22  0349 12/20/22  1214   SODIUM mmol/L 139 140   POTASSIUM mmol/L 3.7 4.6   CHLORIDE mmol/L 104 104   CO2 mmol/L 19.1* 24.6   BUN mg/dL 19 13   CREATININE mg/dL 0.97 0.88   GLUCOSE mg/dL 167* 156*   CALCIUM mg/dL 9.1 9.4     Results from last 7 days   Lab Units 12/20/22  1214   TROPONIN T ng/mL <0.010     Results from last 7 days   Lab Units 12/21/22  0349 12/20/22  1126   WBC 10*3/mm3 19.72* 14.23*   HEMOGLOBIN g/dL 12.4* 15.4   HEMATOCRIT % 36.5* 43.5   PLATELETS 10*3/mm3 258 214     Results from last 7 days   Lab Units 12/21/22  0349 12/20/22  1126   INR  1.11* 1.02   APTT seconds 26.2 23.2         I reviewed the patient's new clinical results.        Medication Review:   levETIRAcetam, 500 mg, Intravenous, Q12H  sodium chloride, 10 mL, Intravenous, Q12H        dexmedetomidine, 0.2-1.5 mcg/kg/hr, Last Rate: Stopped (12/22/22 0914)  niCARdipine, 5-15 mg/hr, Last Rate: Stopped (12/22/22 0330)  niCARdipine, 5-15 mg/hr, Last Rate: Stopped (12/21/22 0620)  sodium chloride 0.45 % with KCl 20 mEq, 75 mL/hr, Last Rate: 75 mL/hr (12/22/22 0639)  sodium chloride, 100 mL/hr        Diagnostic imaging:  I personally and independently reviewed the following images:  CT head 12/21/22: Residual subdural hemorrhage.  Significant pneumocephalus.  Left-to-right midline shift, 8 mm    Assessment     1. Bilateral subdural hematoma, L>R.  Possibly traumatic secondary to anticoagulation, s/p craniotomy & evacuation 12/20  2. Brain swelling and midline shift  3. Encephalopathy, secondary to above  4. Stress-induced leukocytosis  5. History of stroke, on anticoagulation with Plavix  6. Hyperglycemia  7. Pulmonary infiltrates,?  Chronic scarring  8. Anemia, new on this admission.  No evidence of blood loss.      Plan   · Nicardipine drip.  Titrate to keep SBP <150  · Precedex drip and titrate for agitation  · IV hydration with half NS and 20 shira KCl while n.p.o.  · Speech eval again today.  If he fails then we will insert a core  track and start feeding and meds  · DC Cardona catheter.  · Head of bed 30 degree  · Keppra 500 mg twice daily for seizure prophylaxis  · Check CBC in a.m.  · Insulin NovoLog PRN per accu checks  · DVT prophylaxis with SCD          Dianne Kramer MD  12/22/22  11:37 EST      Time: Critical care 32 min      This note was dictated utilizing [employer] dictation

## 2022-12-22 NOTE — PLAN OF CARE
Goal Outcome Evaluation:  Progress: improving   Pt very restless and anxious intermittently throughout the night. Precedex gtt remains on.  Moving all extremities, following commands, disoriented to time and situation, pupils equal, dysarthric, expressive aphasia. Sleeping between care. Cardene gtt started briefly for SBP <150, gtt off.

## 2022-12-23 NOTE — DISCHARGE PLACEMENT REQUEST
"Mingo Aquino (88 y.o. Male)     Date of Birth   1934    Social Security Number       Address   [id number] [place] [address]    Home Phone   [phone]    MRN   [id number]       [employer]   Patient Refused    Marital Status   Single                            Admission Date   12/20/22    Admission Type   Emergency    Admitting Provider   Dianne Kramer MD    Attending Provider   Dianne Kramer MD    Department, Room/Bed   [facility] INTENSIVE CARE, I384/1       Discharge Date       Discharge Disposition       Discharge Destination                               Attending Provider: Dianne Kramer MD    Allergies: No Known Allergies    Isolation: None   Infection: None   Code Status: CPR    Ht: 182.9 cm (72\")   Wt: 87.5 kg (192 lb 14.4 oz)    Admission Cmt: None   Principal Problem: Subdural hematoma [S06.5XAA]                 Active Insurance as of 12/20/2022     Primary Coverage     Payor Plan Insurance Group Employer/Plan Group    [facility] MEDICARE REPLACEMENT [facility] MEDICARE REPLACEMENT [id number]     Payor Plan Address Payor Plan Phone Number Payor Plan Fax Number Effective Dates    [address] [address]   1/1/2018 - None Entered    [facility] [address]       Subscriber Name Subscriber Birth Date Member ID       MINGO AQUINO 1934 [id number]                 Emergency Contacts      (Rel.) Home Phone Work Phone Mobile Phone    Alex Shultz (Other) [phone] -- --    ALEX SHULTZ (Friend) [phone] -- [phone]    Sierra Calix (Friend) -- -- [phone]            "

## 2022-12-23 NOTE — PROGRESS NOTES
Continued Stay Note  [facility]     Patient Name: Mingo Aquino  MRN: [id number]  Today's Date: 12/23/2022    Admit Date: 12/20/2022    Plan: Undetermined.  Referral to [facility]   Discharge Plan     Row Name 12/23/22 0837       Plan    Plan Undetermined.  Referral to [facility]    Plan Comments Pt not yet able to participate with therapy's.  Pt remains confused.  Spoke to FORTINO Myrick and a referral was made to [facility] for acute rehab  CCP following for appropriateness fo acute rehab               Discharge Codes    No documentation.                     Mariella Lancaster RN

## 2022-12-23 NOTE — PROGRESS NOTES
Neurosurgery progress note    Postop day 3 s/p left-sided craniotomy for evacuation of a traumatic subdural hematoma    Subjective: No events reported overnight.  Speech continues to improve    Objective:  Vitals:    12/23/22 0900 12/23/22 1000 12/23/22 1100 12/23/22 1128   BP: 134/60 117/71 131/61    BP Location:       Patient Position:       Pulse: 74 75 82    Resp: 13 24 17    Temp:    98.1 °F (36.7 °C)   TempSrc:    Oral   SpO2: 94% 94% 93%    Weight:       Height:           Physical exam  Awake, alert, oriented x3  Pupils equal round reactive to light  Extraocular muscles intact  Face symmetric  Speech is fluent and clear  No pronator drift  Motor exam  Bilateral deltoids 5/5, bilateral biceps 5/5, bilateral triceps 5/5, bilateral wrist extension 5/5 bilateral hand  5/5  Bilateral hip flexion 5/5, bilateral knee extension 5/5, bilateral DF/PF 5/5  No clonus  No Hailey's reflex  Gait deferred  Able to detect  light touch in all 4 extremities    Assessment/plan  88-year-old male POD #3 s/p left-sided craniotomy for evacuation of an acute traumatic subdural hematoma  -He is recovering very well from his procedure.  His speech has significantly improved.  He is now naming objects correctly and oriented  -Continue to maintain systolic blood pressure less than 150 mmHg.  Okay to transfer to floor when able to wean off of nicardipine  -Recommend PT/OT evaluation for discharge planning

## 2022-12-23 NOTE — PROGRESS NOTES
"                                              LOS: 3 days   Patient Care Team:  Adolfo Adame MD as PCP - General (Family Medicine)    Chief Complaint:  F/up subdural hemorrhage s/p craniotomy ICU care medical problems listed below.    Subjective   Interval History    Remains confused.  Precedex requirement diminished and currently was on 0.2 mics/KG/minute.    He moves all his extremities to commands consistently.    No reports of seizure.    REVIEW OF SYSTEMS:   Cannot obtain due to confusion.    Ventilator/Non-Invasive Ventilation Settings (From admission, onward)    None                Physical Exam:     Vital Signs  Temp:  [97.9 °F (36.6 °C)-98.4 °F (36.9 °C)] 98.1 °F (36.7 °C)  Heart Rate:  [] 82  Resp:  [13-24] 17  BP: ()/(45-94) 131/61    Intake/Output Summary (Last 24 hours) at 12/23/2022 1216  Last data filed at 12/23/2022 0500  Gross per 24 hour   Intake 3156.06 ml   Output 1750 ml   Net 1406.06 ml     Flowsheet Rows    Flowsheet Row First Filed Value   Admission Height 182.9 cm (72\") Documented at 12/20/2022 1147   Admission Weight 80.7 kg (178 lb) Documented at 12/20/2022 1147          PPE used per hospital policy    General Appearance:   Alert, occasionally cooperative, in no acute distress   ENMT:  Mallampati score 3, moist mucous membrane   Eyes:  Pupils equal and reactive to light. EOMI   Neck:   Trachea midline. No thyromegaly.   Lungs:    Clear to auscultation,respirations regular, even and nonlabored    Heart:   Regular rhythm and normal rate, normal S1 and S2, no         murmur   Skin:   No rash or ecchymosis   Abdomen:     Soft. No tenderness. No HSM.   Neuro/psych:  Conscious, alert, confused x3.  Occasionally cooperative.   moves all extremities with symmetrical strength   Extremities:  No cyanosis, clubbing or edema.  Warm extremities and well-perfused          Results Review:        Results from last 7 days   Lab Units 12/23/22  0343 12/21/22  0349 12/20/22  1214   SODIUM " mmol/L 140 139 140   POTASSIUM mmol/L 3.4* 3.7 4.6   CHLORIDE mmol/L 107 104 104   CO2 mmol/L 19.8* 19.1* 24.6   BUN mg/dL 23 19 13   CREATININE mg/dL 0.67* 0.97 0.88   GLUCOSE mg/dL 140* 167* 156*   CALCIUM mg/dL 8.9 9.1 9.4     Results from last 7 days   Lab Units 12/20/22  1214   TROPONIN T ng/mL <0.010     Results from last 7 days   Lab Units 12/23/22  0343 12/21/22  0349 12/20/22  1126   WBC 10*3/mm3 17.61* 19.72* 14.23*   HEMOGLOBIN g/dL 12.5* 12.4* 15.4   HEMATOCRIT % 34.6* 36.5* 43.5   PLATELETS 10*3/mm3 223 258 214     Results from last 7 days   Lab Units 12/21/22  0349 12/20/22  1126   INR  1.11* 1.02   APTT seconds 26.2 23.2         I reviewed the patient's new clinical results.        Medication Review:   levETIRAcetam, 500 mg, Intravenous, Q12H  sodium chloride, 10 mL, Intravenous, Q12H        dexmedetomidine, 0.2-1.5 mcg/kg/hr, Last Rate: Stopped (12/23/22 1000)  niCARdipine, 5-15 mg/hr, Last Rate: 5 mg/hr (12/23/22 0416)  niCARdipine, 5-15 mg/hr, Last Rate: 5 mg/hr (12/23/22 0906)  sodium chloride, 100 mL/hr, Last Rate: 100 mL/hr (12/23/22 0906)        Diagnostic imaging:  I personally and independently reviewed the following images:  CT head 12/21/22: Residual subdural hemorrhage.  Significant pneumocephalus.  Left-to-right midline shift, 8 mm    Assessment     1. Bilateral subdural hematoma, L>R.  Possibly traumatic secondary to anticoagulation, s/p craniotomy & evacuation 12/20  2. Brain swelling and midline shift  3. Encephalopathy, secondary to above  4. Leukocytosis, likely stress-induced.  No evidence of infection  5. History of stroke, on anticoagulation with Plavix  6. Hyperglycemia  7. Pulmonary infiltrates,?  Chronic scarring  8. Anemia, new on this admission.  No evidence of blood loss.  9. Electrolytes disturbance: Hypokalemia  10. A. fib with mild RVR      Plan   · Nicardipine drip.  Titrate to keep SBP <150  · Precedex drip: Titrate wean off  · Swallow evaluation: Performed today and  patient failed.  We will insert core track and start feeding.  · IV hydration with half NS and 20 shira KCl while n.p.o.  · Metoprolol 25 mg twice daily and dig 250 MCG daily for A. fib  · Replace potassium  · Head of bed 30 degree  · Keppra 500 mg twice daily for seizure prophylaxis for 1 week  · Check CBC in a.m.  · Insulin NovoLog PRN per accu checks  · DVT prophylaxis with SCD          Dianne Kramer MD  12/23/22  12:16 EST      Time: Critical care 32 min      This note was dictated utilizing Dragon dictation

## 2022-12-23 NOTE — PLAN OF CARE
Goal Outcome Evaluation:  Plan of Care Reviewed With: patient           Outcome Evaluation: Swallow eval completed. Recommend NPO with temporary means of alternate nutrition and meds alternate route. recommend frequent oral care. SLP to follow for re-eval as overall status improving.

## 2022-12-23 NOTE — PLAN OF CARE
Goal Outcome Evaluation:  Plan of Care Reviewed With: patient         Pt admit due to  jennifer SDH left > right .significant brain compression with midline shift.  He is s/p left craniotomy for evacuation. PMH: CVA, anemia, prostate CA, hep B , pancreatic mass. Pt lives alone. PLOF indep community amb. Per chart pt was found by friends who had dinner plans with pt. Pt is alert and somewhat expressively aphasic. Pt BUE/BLE appear wfl and > 3/5. Coordination NT specifically today. RIght  weaker than right: right handed per pt. Pt mod asst sup to sit. Mod of 2 STS HHA. Pt able to stand with mod min of 2 15-30 secondsx2 with seated rest. Mod of 2 sit to supine. Pt follows commands well but demonstrated impulsive behaviors at times. Marked risk for fallst. Pt will likely benefit from cont skilled PT to address weakness, impaired balance, impaired mobility in all areas. Anticipate rehab: snf vs acute at d/c.

## 2022-12-23 NOTE — PLAN OF CARE
Problem: Adult Inpatient Plan of Care  Goal: Plan of Care Review  Outcome: Ongoing, Progressing  Flowsheets (Taken 12/23/2022 0624)  Outcome Evaluation: Pt following commands. Disoriented to time and situation. Dysarthic. Pt restless throughout the night. Precedex gtt remains on.  Cardene gtt infusing to maintain BP < 150. Incontinence pad changed. U/O 900. PRN pain meds given.  Goal: Patient-Specific Goal (Individualized)  Outcome: Ongoing, Progressing   Goal Outcome Evaluation:              Outcome Evaluation: Pt following commands. Disoriented to time and situation. Dysarthic. Pt restless throughout the night. Precedex gtt remains on.  Cardene gtt infusing to maintain BP < 150. Incontinence pad changed. U/O 900. PRN pain meds given.

## 2022-12-23 NOTE — CONSULTS
Nutrition Services    Patient Name:  Mingo Aquino  YOB: 1934  MRN: [id number]  Admit Date:  12/20/2022    Assessment Date:  12/23/22    Comment:  Nutrition consult for TF's.  He was admitted for bilat SDH, S/P Craniotomy and evacuation 12/20, encephalopathy. He is still lethargic but more alert today. Was hopeful he would pass a swallow eval but failed per discussion with SLP. MD wants to start TF's via a Cortrak.  Will place and start him on Diabetisource AC.  His goal is 70mL/hr.      Will follow clinical course, nutrition needs, transition to po diet.       CLINICAL NUTRITION ASSESSMENT      Reason for Assessment Cortrak Placement, Physician Consult, Tube Feeding Assessment      Diagnosis/Problem   Dx: bilat SDH, S/P Craniotomy and evacuation 12/20, encephalopathy   Medical/Surgical History Past Medical History:   Diagnosis Date   • Arrhythmia    • Cancer (HCC)     PROSTATE   • Colon polyps 11/16/2010    cecum: hyperplastic polyp; descending colon: hyperplastic polyp; sigmoid polyp: tubular adenoma; rectal polyps x2; hyerplastic polyps   • CVA (cerebral vascular accident) (HCC)    • Diverticulosis    • Hypertension    • Stroke (HCC)        Past Surgical History:   Procedure Laterality Date   • COLONOSCOPY N/A 11/16/2010    Pancolonic diverticular disease as well as five small sessile colon polyps-Dr. Cain Rush   • COLONOSCOPY N/A 5/23/2018    Procedure: COLONOSCOPY to CECUM;  Surgeon: Camacho Collier MD;  Location: [facility] ENDOSCOPY;  Service: Gastroenterology   • CRANIOTOMY FOR SUBDURAL HEMATOMA Left 12/20/2022    Procedure: CRANIOTOMY FOR SUBDURAL HEMATOMA;  Surgeon: Shawn Mccullough MD;  Location: [facility] MAIN OR;  Service: Neurosurgery;  Laterality: Left;   • INGUINAL HERNIA REPAIR Left 07/24/2006    Open repair of left inguinal hernia with mesh-Dr. Kofi Bravo   • NASAL SINUS SURGERY Bilateral 12/11/2017    Septoplasty, submucous resection, bilateral endoscopic image-guided  "anterior-posterior ethmoidectomy, bilateral endoscopic image-guided sphenoidotomy, bilateral image-guided endoscopic maxillary antrostomy (with removal of tissue, right axillary sinus), bilateral endosopic image-guided nasofrontal duct dissection, left endoscopic enrique bullosa resection-Dr. Petar Luque   • PROSTATECTOMY          Encounter Information        Nutrition History:     Food Preferences:    Supplements:    Factors Affecting Intake: altered mental status, swallow impairment     Anthropometrics        Current Height  Current Weight  BMI kg/m2 Height: 182.9 cm (72\")  Weight: 87.5 kg (192 lb 14.4 oz) (12/22/22 0431)  Body mass index is 26.16 kg/m².   Adjusted BMI (if applicable)        Admission Weight 87.5kg       Ideal Body Weight (IBW) 77.6kg   Adjusted IBW (if applicable)        Usual Body Weight (UBW) 175-180lb   Weight Change/Trend Gain       Weight History Wt Readings from Last 30 Encounters:   12/22/22 0431 87.5 kg (192 lb 14.4 oz)   12/21/22 0400 83.8 kg (184 lb 11.9 oz)   12/20/22 2238 83.8 kg (184 lb 11.9 oz)   12/20/22 1455 80.7 kg (178 lb)   12/20/22 1147 80.7 kg (178 lb)   11/11/22 1423 80.7 kg (178 lb)   06/07/22 1336 78.5 kg (173 lb)   05/11/22 1349 78.5 kg (173 lb 1.6 oz)   03/01/22 1407 81.1 kg (178 lb 12.8 oz)   11/03/21 1452 79.8 kg (176 lb)   06/02/21 1527 79.4 kg (175 lb)   05/03/21 1509 79.8 kg (175 lb 14.4 oz)   03/03/21 1341 81.6 kg (179 lb 14.4 oz)   02/25/21 1515 81.6 kg (180 lb)   02/25/21 0530 80.9 kg (178 lb 4.8 oz)   02/24/21 1146 80.7 kg (178 lb)   02/02/21 1531 82.1 kg (180 lb 14.4 oz)   01/30/21 1252 80.7 kg (178 lb)   11/12/19 1319 81.2 kg (179 lb)   04/29/19 1116 81.4 kg (179 lb 6.4 oz)   07/09/18 1017 77.4 kg (170 lb 11.2 oz)   05/23/18 1306 75.2 kg (165 lb 11.2 oz)   05/10/18 1347 76.2 kg (168 lb)   05/09/18 1306 75.3 kg (166 lb 1.6 oz)   04/18/18 0749 79.4 kg (175 lb)   03/27/18 0854 80.3 kg (177 lb)   02/08/18 1034 80.9 kg (178 lb 6.6 oz)   04/20/15 1115 83.9 kg (184 " lb 14.4 oz)   04/16/14 0956 84.5 kg (186 lb 3.2 oz)           --  Tests/Procedures        Tests/Procedures Clinical Swallow Evaluation, CT scan     Labs       Pertinent Labs    Results from last 7 days   Lab Units 12/23/22  0343 12/21/22  0349 12/20/22  1214   SODIUM mmol/L 140 139 140   POTASSIUM mmol/L 3.4* 3.7 4.6   CHLORIDE mmol/L 107 104 104   CO2 mmol/L 19.8* 19.1* 24.6   BUN mg/dL 23 19 13   CREATININE mg/dL 0.67* 0.97 0.88   CALCIUM mg/dL 8.9 9.1 9.4   BILIRUBIN mg/dL  --   --  0.8   ALK PHOS U/L  --   --  58   ALT (SGPT) U/L  --   --  41   AST (SGOT) U/L  --   --  43*   GLUCOSE mg/dL 140* 167* 156*     Results from last 7 days   Lab Units 12/23/22  0343 12/21/22  0349 12/20/22  1214   HEMOGLOBIN g/dL 12.5* 12.4*  --    HEMATOCRIT % 34.6* 36.5*  --    WBC 10*3/mm3 17.61* 19.72*  --    TRIGLYCERIDES mg/dL  --  88  --    ALBUMIN g/dL  --   --  4.30     Results from last 7 days   Lab Units 12/23/22  0343 12/21/22  0349 12/20/22  1126   INR   --  1.11* 1.02   APTT seconds  --  26.2 23.2   PLATELETS 10*3/mm3 223 258 214     COVID19   Date Value Ref Range Status   02/24/2021 Not Detected Not Detected - Ref. Range Final     Lab Results   Component Value Date    HGBA1C 5.80 (H) 12/21/2022          Medications           Scheduled Medications levETIRAcetam, 500 mg, Intravenous, Q12H  sodium chloride, 10 mL, Intravenous, Q12H       Infusions dexmedetomidine, 0.2-1.5 mcg/kg/hr, Last Rate: Stopped (12/23/22 1000)  niCARdipine, 5-15 mg/hr, Last Rate: 5 mg/hr (12/23/22 0416)  niCARdipine, 5-15 mg/hr, Last Rate: 5 mg/hr (12/23/22 0906)  sodium chloride, 100 mL/hr, Last Rate: 100 mL/hr (12/23/22 0906)       PRN Medications •  docusate sodium  •  Morphine **AND** naloxone  •  ondansetron **OR** ondansetron  •  senna-docusate sodium  •  sodium chloride  •  sodium chloride  •  sodium chloride     Physical Findings          Physical Appearance alert, disoriented, on oxygen therapy   Oral/Mouth Cavity dry mouth   Edema  no edema  "  Gastrointestinal normoactive, last bowel movement: pending   Skin  surgical incision   Tubes/Drains none   NFPE Not applicable at this time     Estimated/Assessed Needs       Energy Requirements    Height for Calculation  Height: 182.9 cm (72\")   Weight for Calculation 87.5kg   Method for Estimation  22 kcal/kg, 25 kcal/kg   EST Needs (kcal/day) 6430-4826       Protein Requirements    Weight for Calculation 87.5kg   EST Protein Needs (g/kg) 1.0 - 1.2 gm/kg   EST Daily Needs (g/day)        Fluid Requirements     Method for Estimation 1 mL/kcal    Estimated Needs (mL/day) 2000       Fluid Deficit    Current Na Level (mEq/L)    Desired Na Level (mEq/L)    Estimated Fluid Deficit (L)           Current Nutrition Orders & Evaluation of Intake       Oral Nutrition     Food Allergies NKFA   Current PO Diet NPO Diet NPO Type: Strict NPO   Supplement n/a   PO Evaluation     % PO Intake     # of Days Evaluated    --  PES STATEMENT / NUTRITION DIAGNOSIS      Nutrition Dx Problem  Problem: Needs Alternative Route  Etiology: Medical Diagnosis SDH, C/P crani  Signs/Symptoms: NPO and SLP/Swallow Evaluation failed    Comment:    --  NUTRITION INTERVENTION / PLAN OF CARE      Intervention Goal(s) Maintain nutrition status, Nutrition support treatment, Reduce/improve symptoms, Meet estimated needs, Disease management/therapy, Initiate feeding/diet, Initiate TF/PN, Tolerate TF/PN at goal, Transition TF to PO and No significant weight loss         RD Intervention/Action Await begin PO diet, Follow Tx Progress, Care plan reviewed and Recommend/ordered:          Prescription/Orders:       PO Diet       Supplements       Snacks       Enteral Nutrition       Parenteral Nutrition    New Prescription Ordered? Yes      Enteral Prescription:     Enteral Route NG    TF Delivery Method Continuous    Enteral Product Diabetisource AC    Modular     Propofol Rate/Kcal     TF Start Rate (mL/hr) 20    TF Goal Rate (mL/hr) 70    Free Water " Flush (mL) 30mL q 4 hrs    TF Provision at Goal: 2016kcal, 101gm protein, 1377mL free water + 180mL water flushes         Calories 100 % needs met         Protein  100 % needs met         Fluid (mL)  1557mL total     Prescription Ordered Yes         Monitor/Evaluation Per protocol, I&O, PO intake, Pertinent labs, EN delivery/tolerance, Weight, Skin status, GI status, Symptoms, POC/GOC, Swallow function, Hemodynamic stability   Discharge Plan/Needs Pending clinical course   Education Will instruct as appropriate   --    RD to follow per protocol.    Electronically signed by:  Antionette Corbett RD  12/23/22 11:51 EST

## 2022-12-23 NOTE — THERAPY EVALUATION
Acute Care - Speech Language Pathology   Swallow Initial Evaluation [facility]     Patient Name: Mingo Aquino  : 1934  MRN: [id number]  Today's Date: 2022               Admit Date: 2022    Visit Dx:     ICD-10-CM ICD-9-CM   1. 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)   • History of CVA (cerebrovascular accident)   • Lower urinary tract symptoms (LUTS)   • History of prostate cancer   • Subdural hematoma     Past Medical History:   Diagnosis Date   • Arrhythmia    • Cancer (HCC)     PROSTATE   • Colon polyps 2010    cecum: hyperplastic polyp; descending colon: hyperplastic polyp; sigmoid polyp: tubular adenoma; rectal polyps x2; hyerplastic polyps   • CVA (cerebral vascular accident) (HCC)    • Diverticulosis    • Hypertension    • Stroke (HCC)      Past Surgical History:   Procedure Laterality Date   • COLONOSCOPY N/A 2010    Pancolonic diverticular disease as well as five small sessile colon polyps-Dr. Cain Rush   • COLONOSCOPY N/A 2018    Procedure: COLONOSCOPY to CECUM;  Surgeon: Camacho Collier MD;  Location: [facility] ENDOSCOPY;  Service: Gastroenterology   • CRANIOTOMY FOR SUBDURAL HEMATOMA Left 2022    Procedure: CRANIOTOMY FOR SUBDURAL HEMATOMA;  Surgeon: Shawn Mccullough MD;  Location: [facility] MAIN OR;  Service: Neurosurgery;  Laterality: Left;   • INGUINAL HERNIA REPAIR Left 2006    Open repair of left inguinal hernia with mesh-Dr. Kofi Bravo   • NASAL SINUS SURGERY Bilateral 2017    Septoplasty, submucous resection, bilateral endoscopic image-guided anterior-posterior  ethmoidectomy, bilateral endoscopic image-guided sphenoidotomy, bilateral image-guided endoscopic maxillary antrostomy (with removal of tissue, right axillary sinus), bilateral endosopic image-guided nasofrontal duct dissection, left endoscopic enrique bullosa resection-Dr. Petar Luque   • PROSTATECTOMY         SLP Recommendation and Plan  SLP Swallowing Diagnosis: oral dysphagia, suspected pharyngeal dysphagia (12/23/22 0930)  SLP Diet Recommendation: NPO, temporary alternate methods of nutrition/hydration (12/23/22 0930)     SLP Rec. for Method of Medication Administration: meds via alternate route (12/23/22 0930)     Monitor for Signs of Aspiration: yes, notify SLP if any concerns (12/23/22 0930)  Recommended Diagnostics: reassess via clinical swallow evaluation (12/23/22 0930)  Swallow Criteria for Skilled Therapeutic Interventions Met: demonstrates skilled criteria (12/23/22 0930)  Anticipated Discharge Disposition (SLP): unknown (12/23/22 0930)  Rehab Potential/Prognosis, Swallowing: adequate, monitor progress closely (12/23/22 0930)  Therapy Frequency (Swallow): PRN (12/23/22 0930)  Predicted Duration Therapy Intervention (Days): until discharge (12/23/22 0930)                                        Plan of Care Reviewed With: patient  Outcome Evaluation: Swallow eval completed. Recommend NPO with temporary means of alternate nutrition and meds alternate route. recommend frequent oral care. SLP to follow for re-eval as overall status improving.      SWALLOW EVALUATION (last 72 hours)     SLP Adult Swallow Evaluation     Row Name 12/23/22 0930                   Rehab Evaluation    Document Type evaluation  -OC        Subjective Information no complaints  -OC        Patient Observations alert;cooperative;agree to therapy  -OC        Patient Effort good  -OC        Symptoms Noted During/After Treatment none  -OC           General Information    Patient Profile Reviewed yes  -OC        Pertinent History Of  Current Problem Patient admitted with SDH, POD 3 L Crani with evacuation Large L SDH significant brain compression. SDH R frontal, lateral left cerebral and L frontal.  -OC        Current Method of Nutrition NPO  -OC        Precautions/Limitations, Vision WFL  -OC        Precautions/Limitations, Hearing WFL  -OC        Prior Level of Function-Communication WFL  -OC        Prior Level of Function-Swallowing unknown  -OC        Plans/Goals Discussed with patient;agreed upon  -OC        Barriers to Rehab medically complex  -OC        Patient's Goals for Discharge patient did not state  -OC           Pain    Additional Documentation Pain Scale: Numbers Pre/Post-Treatment (Group)  -OC           Pain Scale: Numbers Pre/Post-Treatment    Pretreatment Pain Rating 0/10 - no pain  -OC        Posttreatment Pain Rating 0/10 - no pain  -OC           Oral Motor Structure and Function    Dentition Assessment natural, present and adequate  -OC        Secretion Management dried secretions in oral cavity  -OC        Mucosal Quality dry  -OC        Gag Response absent or diminished  diminished  -OC        Volitional Swallow unable to elicit  -OC        Volitional Cough unable to elicit  -OC           Oral Musculature and Cranial Nerve Assessment    Oral Motor General Assessment generalized oral motor weakness  -OC           Clinical Swallow Eval    Clinical Swallow Evaluation Summary Patient required extensive oral care with removal of large piece of dried secretion from hard and soft palate. Patient with diminished gag during oral care, eyes watering, and delayed swallow when cued. Patient with decreased oral awareness. Patient with poor awareness of ice chips in oral cavity. Sitting with mouth open posture s/p acceptance of ice chip X2. Patient with decreased oral manipulation, delayed swallow, and wet breathing s/p ice chip trials. No further trials administered.  -OC           SLP Evaluation Clinical Impression    SLP Swallowing  Diagnosis oral dysphagia;suspected pharyngeal dysphagia  -OC        Functional Impact risk of aspiration/pneumonia  -OC        Rehab Potential/Prognosis, Swallowing adequate, monitor progress closely  -OC        Swallow Criteria for Skilled Therapeutic Interventions Met demonstrates skilled criteria  -OC           Recommendations    Therapy Frequency (Swallow) PRN  -OC        Predicted Duration Therapy Intervention (Days) until discharge  -OC        SLP Diet Recommendation NPO;temporary alternate methods of nutrition/hydration  -OC        Recommended Diagnostics reassess via clinical swallow evaluation  -OC        Oral Care Recommendations Oral Care BID/PRN  -OC        SLP Rec. for Method of Medication Administration meds via alternate route  -OC        Monitor for Signs of Aspiration yes;notify SLP if any concerns  -OC        Anticipated Discharge Disposition (SLP) unknown  -OC              User Key  (r) = Recorded By, (t) = Taken By, (c) = Cosigned By    Initials Name Effective Dates    OC Karina Rodriguez MA,SPIKE-SLP 06/16/21 -                 EDUCATION  The patient has been educated in the following areas:   Dysphagia (Swallowing Impairment).              Time Calculation:    Time Calculation- SLP     Row Name 12/23/22 1332             Time Calculation- SLP    SLP Start Time 0900  -OC      SLP Received On 12/23/22  -OC         Untimed Charges    SLP Eval/Re-eval  ST Eval Oral Pharyng Swallow - 87760  -OC      [id number] Eval Oral Pharyng Swallow Minutes 60  -OC         Total Minutes    Untimed Charges Total Minutes 60  -OC       Total Minutes 60  -OC            User Key  (r) = Recorded By, (t) = Taken By, (c) = Cosigned By    Initials Name Provider Type    OC Karina Rodriguez MA,CCC-SLP Speech and Language Pathologist                Therapy Charges for Today     Code Description Service Date Service Provider Modifiers Qty    [id number]  ST EVAL ORAL PHARYNG SWALLOW 4 12/23/2022 Karina Rodriguez MA,CCC-SLP GN 1                Karina Rodriguez MA,CCC-SLP  12/23/2022

## 2022-12-23 NOTE — THERAPY EVALUATION
Patient Name: Mingo Aquino  : 1934    MRN: [id number]                              Today's Date: 2022       Admit Date: 2022    Visit Dx:     ICD-10-CM ICD-9-CM   1. 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)   • History of CVA (cerebrovascular accident)   • Lower urinary tract symptoms (LUTS)   • History of prostate cancer   • Subdural hematoma     Past Medical History:   Diagnosis Date   • Arrhythmia    • Cancer (HCC)     PROSTATE   • Colon polyps 2010    cecum: hyperplastic polyp; descending colon: hyperplastic polyp; sigmoid polyp: tubular adenoma; rectal polyps x2; hyerplastic polyps   • CVA (cerebral vascular accident) (HCC)    • Diverticulosis    • Hypertension    • Stroke (HCC)      Past Surgical History:   Procedure Laterality Date   • COLONOSCOPY N/A 2010    Pancolonic diverticular disease as well as five small sessile colon polyps-Dr. Cain Rush   • COLONOSCOPY N/A 2018    Procedure: COLONOSCOPY to CECUM;  Surgeon: Camacho Collier MD;  Location: [facility] ENDOSCOPY;  Service: Gastroenterology   • CRANIOTOMY FOR SUBDURAL HEMATOMA Left 2022    Procedure: CRANIOTOMY FOR SUBDURAL HEMATOMA;  Surgeon: Shawn Mccullough MD;  Location: [facility] MAIN OR;  Service: Neurosurgery;  Laterality: Left;   • INGUINAL HERNIA REPAIR Left 2006    Open repair of left inguinal hernia with mesh-Dr. Kofi Bravo   • NASAL SINUS SURGERY Bilateral 2017    Septoplasty, submucous resection, bilateral endoscopic image-guided anterior-posterior ethmoidectomy, bilateral endoscopic image-guided sphenoidotomy, bilateral  image-guided endoscopic maxillary antrostomy (with removal of tissue, right axillary sinus), bilateral endosopic image-guided nasofrontal duct dissection, left endoscopic enrique bullosa resection-Dr. Petar Luque   • PROSTATECTOMY        General Information     Row Name 12/23/22 1340          Physical Therapy Time and Intention    Document Type evaluation  -SV     Mode of Treatment individual therapy;physical therapy  -SV     Row Name 12/23/22 1344          General Information    Patient Profile Reviewed yes  -SV     Prior Level of Function independent:  -SV     Existing Precautions/Restrictions fall  left craniotomy , NG tube  -SV     Barriers to Rehab medically complex  expressive aphasic  -SV     Row Name 12/23/22 1340          Living Environment    People in Home alone  -SV     Row Name 12/23/22 1345          Stairs Within Home, Primary    Stairs Comment, Within Home, Primary undetermined  -SV     Row Name 12/23/22 1348          Cognition    Orientation Status (Cognition) oriented to;person;unable/difficult to assess  -SV     Row Name 12/23/22 1346          Safety Issues, Functional Mobility    Safety Issues Affecting Function (Mobility) at risk behavior observed;awareness of need for assistance;impulsivity;judgment  -SV     Impairments Affecting Function (Mobility) balance;cognition;endurance/activity tolerance;strength  -SV     Cognitive Impairments, Mobility Safety/Performance awareness, need for assistance;insight into deficits/self-awareness;judgment  -SV           User Key  (r) = Recorded By, (t) = Taken By, (c) = Cosigned By    Initials Name Provider Type    SV Lidia Gamble, PT Physical Therapist               Mobility     Row Name 12/23/22 7185          Bed Mobility    Bed Mobility supine-sit;sit-supine  -SV     Supine-Sit [place] (Bed Mobility) moderate assist (50% patient effort)  -SV     Sit-Supine [place] (Bed Mobility) moderate assist (50% patient effort);2 person assist  -SV      Assistive Device (Bed Mobility) head of bed elevated;bed rails  -     Row Name 12/23/22 1424          Sit-Stand Transfer    Sit-Stand [place] (Transfers) moderate assist (50% patient effort);2 person assist  -SV     Comment, (Sit-Stand Transfer) HHA , stood x2 with mod/minx2  -           User Key  (r) = Recorded By, (t) = Taken By, (c) = Cosigned By    Initials Name Provider Type     Lidia Gamble, PT Physical Therapist               Obj/Interventions     Row Name 12/23/22 1427          Range of Motion Comprehensive    Comment, General Range of Motion arom BUE/BLE appear wfl, coordination NT  -     Row Name 12/23/22 1427          Strength Comprehensive (MMT)    Comment, General Manual Muscle Testing (MMT) Assessment BUE/BLE grossly obs at >3/5 with rom,  weaker on right than left ( right handed )  -     Row Name 12/23/22 1427          Motor Skills    Therapeutic Exercise --  laq x5 2 sets arom  -     Row Name 12/23/22 1427          Balance    Comment, Balance sit bal cga or min due to impulsive movements  -     Row Name 12/23/22 1427          Sensory Assessment (Somatosensory)    Sensory Assessment (Somatosensory) unable/difficult to assess;not tested  -           User Key  (r) = Recorded By, (t) = Taken By, (c) = Cosigned By    Initials Name Provider Type     Lidia Gamble, PT Physical Therapist               Goals/Plan     Row Name 12/23/22 1429          Bed Mobility Goal 1 (PT)    Activity/Assistive Device (Bed Mobility Goal 1, PT) sit to supine/supine to sit  -SV     [place] Level/Cues Needed (Bed Mobility Goal 1, PT) standby assist  -SV     Time Frame (Bed Mobility Goal 1, PT) 10 days  -     Row Name 12/23/22 1429          Transfer Goal 1 (PT)    Activity/Assistive Device (Transfer Goal 1, PT) sit-to-stand/stand-to-sit;walker, rolling  -SV     [place] Level/Cues Needed (Transfer Goal 1, PT) contact guard required  -SV     Time Frame (Transfer Goal 1, PT) 10 days  -SV      Row Name 12/23/22 1429          Gait Training Goal 1 (PT)    Activity/Assistive Device (Gait Training Goal 1, PT) gait (walking locomotion);walker, rolling  -SV     [place] Level (Gait Training Goal 1, PT) contact guard required  -SV     Distance (Gait Training Goal 1, PT) 150'  -SV     Time Frame (Gait Training Goal 1, PT) 10 days  -SV     Row Name 12/23/22 1429          Therapy Assessment/Plan (PT)    Planned Therapy Interventions (PT) balance training;bed mobility training;gait training;home exercise program;patient/family education;strengthening;transfer training  -SV           User Key  (r) = Recorded By, (t) = Taken By, (c) = Cosigned By    Initials Name Provider Type    SV Lidia Gamble, PT Physical Therapist               Clinical Impression     Row Name 12/23/22 1428          Pain    Pretreatment Pain Rating 0/10 - no pain  -SV     Pain Intervention(s) Repositioned  -SV     Row Name 12/23/22 1428          Plan of Care Review    Plan of Care Reviewed With patient  -SV     Row Name 12/23/22 1428          Therapy Assessment/Plan (PT)    Patient/Family Therapy Goals Statement (PT) rehab  -SV     Rehab Potential (PT) good, to achieve stated therapy goals  -SV     Criteria for Skilled Interventions Met (PT) yes  -SV     Therapy Frequency (PT) 6 times/wk  -SV     Predicted Duration of Therapy Intervention (PT) 6-8 weeks  -SV     Row Name 12/23/22 1428          Vital Signs    O2 Delivery Pre Treatment room air  -SV     O2 Delivery Intra Treatment room air  -SV     O2 Delivery Post Treatment room air  -SV     Pre Patient Position Supine  -SV     Intra Patient Position Standing  -SV     Post Patient Position Supine  -SV     Row Name 12/23/22 1428          Positioning and Restraints    Pre-Treatment Position in bed  -SV     Post Treatment Position bed  -SV     In Bed notified nsg;call light within reach;encouraged to call for assist;exit alarm on;fowlers  -SV           User Key  (r) = Recorded By, (t) =  Taken By, (c) = Cosigned By    Initials Name Provider Type     Lidia Gamble, PT Physical Therapist               Outcome Measures     Row Name 12/23/22 1430          How much help from another person do you currently need...    Turning from your back to your side while in flat bed without using bedrails? 2  -SV     Moving from lying on back to sitting on the side of a flat bed without bedrails? 2  -SV     Moving to and from a bed to a chair (including a wheelchair)? 1  -SV     Standing up from a chair using your arms (e.g., wheelchair, bedside chair)? 2  -SV     Climbing 3-5 steps with a railing? 1  -SV     To walk in hospital room? 1  -SV     AM-PAC 6 Clicks Score (PT) 9  -SV     Highest level of mobility 3 --> Sat at edge of bed  -     Row Name 12/23/22 1430          Functional Assessment    Outcome Measure Options AM-PAC 6 Clicks Basic Mobility (PT)  -           User Key  (r) = Recorded By, (t) = Taken By, (c) = Cosigned By    Initials Name Provider Type     Lidia Gamble, PT Physical Therapist                             Physical Therapy Education     Title: PT OT SLP Therapies (In Progress)     Topic: Physical Therapy (In Progress)     Point: Mobility training (In Progress)     Learning Progress Summary           Patient Acceptance, E, NR by  at 12/23/2022 1431                   Point: Home exercise program (In Progress)     Learning Progress Summary           Patient Acceptance, E, NR by  at 12/23/2022 1431                               User Key     Initials Effective Dates Name Provider Type Discipline     06/16/21 -  Lidia Gamble, PT Physical Therapist PT              PT Recommendation and Plan  Planned Therapy Interventions (PT): balance training, bed mobility training, gait training, home exercise program, patient/family education, strengthening, transfer training  Plan of Care Reviewed With: patient     Time Calculation:    PT Charges     Row Name 12/23/22 1440             Time  Calculation    Start Time 1345  -SV      Stop Time 1411  -SV      Time Calculation (min) 26 min  -SV      PT Received On 12/23/22  -SV      PT - Next Appointment 12/24/22  -SV      PT Goal Re-Cert Due Date 01/02/23 -SV            User Key  (r) = Recorded By, (t) = Taken By, (c) = Cosigned By    Initials Name Provider Type     Lidia Gamble, PT Physical Therapist              Therapy Charges for Today     Code Description Service Date Service Provider Modifiers Qty    89103834344 HC PT EVAL MOD COMPLEXITY 2 12/23/2022 Lidia Gamble, PT GP 1    38789018212 HC PT THER SUPP EA 15 MIN 12/23/2022 Lidia Gamble, PT GP 1    49670175299 HC PT THERAPEUTIC ACT EA 15 MIN 12/23/2022 Lidia Gamble, PT GP 1          PT G-Codes  Outcome Measure Options: AM-PAC 6 Clicks Basic Mobility (PT)  AM-PAC 6 Clicks Score (PT): 9  PT Discharge Summary  Anticipated Discharge Disposition (PT): inpatient rehabilitation facility    Lidia Gamble, PT  12/23/2022

## 2022-12-24 NOTE — PROGRESS NOTES
"                                              LOS: 4 days   Patient Care Team:  Adolfo Adame MD as PCP - General (Family Medicine)    Chief Complaint:  F/up subdural hemorrhage s/p craniotomy ICU care medical problems listed below.    Subjective   Interval History    Looks better today.  He is much more alert.  He is oriented to self, place and situation.    However he remained intermittently agitated and confused and still requiring Precedex but at the low dose 0.4 mics/KG/minute    Blood pressure is elevated.  On Cardene.    He moves all his extremities to commands consistently.    No reports of seizure.    REVIEW OF SYSTEMS:   Cardiovascular: No chest pain or palpitation  Gastrointestinal: No nausea or vomiting  Respiratory: No shortness of breath or cough    Ventilator/Non-Invasive Ventilation Settings (From admission, onward)    None                Physical Exam:     Vital Signs  Temp:  [97.5 °F (36.4 °C)-98.9 °F (37.2 °C)] 97.7 °F (36.5 °C)  Heart Rate:  [] 92  Resp:  [14-26] 21  BP: (106-169)/(60-90) 159/79    Intake/Output Summary (Last 24 hours) at 12/24/2022 1106  Last data filed at 12/24/2022 0400  Gross per 24 hour   Intake 2393 ml   Output 1650 ml   Net 743 ml     Flowsheet Rows    Flowsheet Row First Filed Value   Admission Height 182.9 cm (72\") Documented at 12/20/2022 1147   Admission Weight 80.7 kg (178 lb) Documented at 12/20/2022 1147          PPE used per hospital policy    General Appearance:   Alert, occasionally cooperative, in no acute distress   ENMT:  Mallampati score 3, moist mucous membrane   Eyes:  Pupils equal and reactive to light. EOMI   Neck:   Trachea midline. No thyromegaly.   Lungs:    Clear to auscultation,respirations regular, even and nonlabored    Heart:   Regular rhythm and normal rate, normal S1 and S2, no         murmur   Skin:   No rash or ecchymosis   Abdomen:     Soft. No tenderness. No HSM.   Neuro/psych:  Conscious, alert, oriented x3.  Mostly cooperative.  " Moves all extremities with symmetrical strength.   Extremities:  No cyanosis, clubbing or edema.  Warm extremities and well-perfused          Results Review:        Results from last 7 days   Lab Units 12/24/22  0835 12/23/22  0343 12/21/22  0349   SODIUM mmol/L 142 140 139   POTASSIUM mmol/L 3.9 3.4* 3.7   CHLORIDE mmol/L 114* 107 104   CO2 mmol/L 21.2* 19.8* 19.1*   BUN mg/dL 28* 23 19   CREATININE mg/dL 0.61* 0.67* 0.97   GLUCOSE mg/dL 164* 140* 167*   CALCIUM mg/dL 9.0 8.9 9.1     Results from last 7 days   Lab Units 12/20/22  1214   TROPONIN T ng/mL <0.010     Results from last 7 days   Lab Units 12/24/22  0835 12/23/22  0343 12/21/22  0349   WBC 10*3/mm3 13.50* 17.61* 19.72*   HEMOGLOBIN g/dL 11.6* 12.5* 12.4*   HEMATOCRIT % 34.1* 34.6* 36.5*   PLATELETS 10*3/mm3 226 223 258     Results from last 7 days   Lab Units 12/21/22  0349 12/20/22  1126   INR  1.11* 1.02   APTT seconds 26.2 23.2         I reviewed the patient's new clinical results.        Medication Review:   digoxin, 250 mcg, Oral, Daily  levETIRAcetam, 500 mg, Intravenous, Q12H  metoprolol tartrate, 25 mg, Oral, Q12H  sodium chloride, 10 mL, Intravenous, Q12H        dexmedetomidine, 0.2-1.5 mcg/kg/hr, Last Rate: 0.4 mcg/kg/hr (12/24/22 0409)  niCARdipine, 5-15 mg/hr, Last Rate: 5 mg/hr (12/24/22 0902)  sodium chloride 0.9 % with KCl 40 mEq/L, 100 mL/hr, Last Rate: 100 mL/hr (12/24/22 0230)        Diagnostic imaging:  I personally and independently reviewed the following images:  CT head 12/21/22: Residual subdural hemorrhage.  Significant pneumocephalus.  Left-to-right midline shift, 8 mm    Assessment     1. Bilateral subdural hematoma, L>R.    Traumatic and secondary to anticoagulation, s/p craniotomy & evacuation 12/20  2. Brain swelling and midline shift  3. Encephalopathy, secondary to above  4. Leukocytosis, likely stress-induced.  No evidence of infection  5. History of stroke, on anticoagulation with Plavix  6. Hyperglycemia  7. Pulmonary  infiltrates,?  Chronic scarring  8. Anemia, new on this admission.  No evidence of blood loss.  9. Electrolytes disturbance: Hypokalemia  10. A. fib with mild RVR      Plan   · Nicardipine drip.  Titrate to keep SBP <150.  · Start amlodipine 2.5 mg twice daily  · Precedex drip: Titrate wean off  · Start tube feeding.  Repeat swallow eval.  · DC IV fluid as he is getting enteral nutrition  · Metoprolol 25 mg twice daily and dig 250 MCG daily for A. fib  · Head of bed 30 degree  · Keppra 500 mg twice daily for seizure prophylaxis for 1 week  · Insulin NovoLog PRN per accu checks  · DVT prophylaxis with SCD  · PT OT  · F/up with neurosurgery in 2-3 weeks.      Dianne Kramer MD  12/24/22  11:06 EST      Time: Critical care 32 min      This note was dictated utilizing [employer] dictation

## 2022-12-24 NOTE — PLAN OF CARE
Goal Outcome Evaluation:   Patient had a good day. He was able to get out of bed with physical therapy and has become more alert and oriented throughout the shift- alert and oriented x4 at change of shift. Cardene weaned off. Tube feeding started, speech following

## 2022-12-24 NOTE — PROGRESS NOTES
Neurosurgery progress note    Postop day #4 s/p left-sided craniotomy for evacuation of a traumatic subdural hematoma    Subjective: No events reported overnight.  He is much more alert and speaking more clearly today.    Objective:  Vitals:    12/24/22 0400 12/24/22 0500 12/24/22 0600 12/24/22 0825   BP: 155/82 152/85 169/86    BP Location: Left arm      Patient Position: Lying      Pulse: 69 68 71    Resp: 18      Temp:    97.7 °F (36.5 °C)   TempSrc:    Axillary   SpO2: 90% 96% 96%    Weight:       Height:           Physical exam  Awake, alert, oriented x3  Pupils equal round reactive to light  Extraocular muscles intact  Face symmetric  Speech is fluent and clear  No pronator drift  Motor exam  Bilateral deltoids 5/5, bilateral biceps 5/5, bilateral triceps 5/5, bilateral wrist extension 5/5 bilateral hand  5/5  Bilateral hip flexion 5/5, bilateral knee extension 5/5, bilateral DF/PF 5/5  No clonus  No Hailey's reflex  Gait deferred  Able to detect  light touch in all 4 extremities    Assessment/plan  88-year-old male POD #4 s/p left-sided craniotomy for evacuation of an acute traumatic subdural hematoma  -Okay to transfer to the floor from neurosurgery standpoint.  Recommend repeat swallow testing as he is much more alert and speaking clearly today.  -He lives home alone and will likely need inpatient rehab.  Recommend PT/OT evaluation for discharge placement recommendations  -I will plan to see him back in 2 to 3 weeks in clinic for wound check with a repeat CT head

## 2022-12-24 NOTE — PROGRESS NOTES
[facility]    Spoke with POJESSE, Ramez Shultz, discussed the difference between acute and subacute rehab.  POA states pt doesn't have anyone that could stay with him 24/7 upon discharge.  Requested us to continue to follow him at least till he is out of ICU.    Rizwana Charles RN  Acute Rehab Admission Nurse

## 2022-12-24 NOTE — PLAN OF CARE
Goal Outcome Evaluation:   Patient is alert & oriented to simple questions but is having a hard time understanding in depth concepts such as not pulling out Ivs, staying in the bed, not pulling off O2 device, etc. I have spoke with him in detail about the importance of leaving in his IV's, oxygen, and staying I n the bed due to being on precedex and while he verbalizes understanding at the moment, he does not recall having these conversations after a few minutes. He has not slept more than 10 minute increments in over 24 hours. Spoke with Dr Kramer- orders received.     Cardene remains on to maintain SBP <150    Friends visited and sat at bedside.

## 2022-12-25 NOTE — PROGRESS NOTES
"                                              LOS: 5 days   Patient Care Team:  Adolfo Adame MD as PCP - General (Family Medicine)    Chief Complaint:  F/up subdural hemorrhage s/p craniotomy ICU care medical problems listed below.    Subjective   Interval History    Pulled his core track last night.    It was reported that he has not been sleeping much over the last few nights.    Remains on Precedex drip for intermittent periods of agitation.    Patient answers questions most of the time appropriately but then he gets back to sleep.    REVIEW OF SYSTEMS:   Cardiovascular: No chest pain or palpitation  Gastrointestinal: No nausea or vomiting  Respiratory: No shortness of breath or cough    Ventilator/Non-Invasive Ventilation Settings (From admission, onward)    None                Physical Exam:     Vital Signs  Temp:  [97.4 °F (36.3 °C)-98.6 °F (37 °C)] 98.1 °F (36.7 °C)  Heart Rate:  [] 80  Resp:  [16-26] 16  BP: (105-165)/() 116/96    Intake/Output Summary (Last 24 hours) at 12/25/2022 0953  Last data filed at 12/25/2022 0705  Gross per 24 hour   Intake 2115 ml   Output 3725 ml   Net -1610 ml     Flowsheet Rows    Flowsheet Row First Filed Value   Admission Height 182.9 cm (72\") Documented at 12/20/2022 1147   Admission Weight 80.7 kg (178 lb) Documented at 12/20/2022 1147          PPE used per hospital policy    General Appearance:   Alert,  cooperative, in no acute distress   ENMT:  Mallampati score 3, moist mucous membrane   Eyes:  Pupils equal and reactive to light. EOMI   Neck:   Trachea midline. No thyromegaly.   Lungs:    Clear to auscultation,respirations regular, even and nonlabored    Heart:   Regular rhythm and normal rate, normal S1 and S2, no         murmur   Skin:   No rash or ecchymosis   Abdomen:     Soft. No tenderness. No HSM.   Neuro/psych:  Conscious, alert, oriented x3.  Mostly cooperative.  Moves all extremities with symmetrical strength.   Extremities:  No cyanosis, " clubbing or edema.  Warm extremities and well-perfused          Results Review:        Results from last 7 days   Lab Units 12/25/22  0341 12/24/22  1535 12/24/22  0835 12/23/22  0343   SODIUM mmol/L 143  --  142 140   POTASSIUM mmol/L 3.9 3.9 3.9 3.4*   CHLORIDE mmol/L 115*  --  114* 107   CO2 mmol/L 19.0*  --  21.2* 19.8*   BUN mg/dL 23  --  28* 23   CREATININE mg/dL 0.57*  --  0.61* 0.67*   GLUCOSE mg/dL 136*  --  164* 140*   CALCIUM mg/dL 8.8  --  9.0 8.9     Results from last 7 days   Lab Units 12/20/22  1214   TROPONIN T ng/mL <0.010     Results from last 7 days   Lab Units 12/25/22  0341 12/24/22  0835 12/23/22  0343   WBC 10*3/mm3 12.24* 13.50* 17.61*   HEMOGLOBIN g/dL 12.1* 11.6* 12.5*   HEMATOCRIT % 34.9* 34.1* 34.6*   PLATELETS 10*3/mm3 261 226 223     Results from last 7 days   Lab Units 12/21/22  0349 12/20/22  1126   INR  1.11* 1.02   APTT seconds 26.2 23.2         I reviewed the patient's new clinical results.        Medication Review:   digoxin, 250 mcg, Oral, Daily  ipratropium-albuterol, 3 mL, Nebulization, 4x Daily - RT  levETIRAcetam, 500 mg, Intravenous, Q12H  melatonin, 5 mg, Oral, Nightly  metoprolol tartrate, 25 mg, Oral, Q12H  sodium chloride, 10 mL, Intravenous, Q12H        dexmedetomidine, 0.2-1.5 mcg/kg/hr, Last Rate: 0.4 mcg/kg/hr (12/25/22 0705)  niCARdipine, 5-15 mg/hr, Last Rate: Stopped (12/24/22 2216)  sodium chloride 0.9 % with KCl 40 mEq/L, 100 mL/hr, Last Rate: 100 mL/hr (12/25/22 0935)        Diagnostic imaging:  I personally and independently reviewed the following images:  CT head 12/21/22: Residual subdural hemorrhage.  Significant pneumocephalus.  Left-to-right midline shift, 8 mm    Assessment     1. Bilateral subdural hematoma, L>R.    Traumatic and secondary to anticoagulation, s/p craniotomy & evacuation 12/20  2. Brain swelling and midline shift  3. Delirium  4. Leukocytosis, likely stress-induced.  No evidence of infection  5. History of stroke, on anticoagulation with  Plavix  6. Hyperglycemia  7. Pulmonary infiltrates,?  Chronic scarring  8. Anemia, new on this admission.  No evidence of blood loss.  9. A. fib with mild RVR  10. Hyperchloremic metabolic acidosis      Plan   · Nicardipine drip.  Titrate to keep SBP <150.  · Switch digoxin to IV  · Repeat speech eval today.  DC fails then will try to reinsert a core track.  · Precedex drip: Titrate wean off  · Start Seroquel 50 mg nightly  · IV fluid while NPO  · Metoprolol 25 mg twice daily.  Can use Lopressor as needed if unable to take pills  · Head of bed 30 degree  · Keppra 500 mg twice daily for seizure prophylaxis for 1 week  · Insulin NovoLog PRN per accu checks  · DVT prophylaxis with SCD  · PT OT  · F/up with neurosurgery in 2-3 weeks.    Discussed with nursing staff    Dianne Kramer MD  12/25/22  09:53 EST      Time: Critical care 33 min      This note was dictated utilizing [employer] dictation

## 2022-12-25 NOTE — PLAN OF CARE
Goal Outcome Evaluation:  Plan of Care Reviewed With: patient        Progress: improving  Outcome Evaluation: Pt intermittionly A&Ox4, intermittion confusion. Frequently forgetful. Pt removed IVs this AM, restraints placed. Awaiting for bed outside of ICU.

## 2022-12-25 NOTE — PLAN OF CARE
Goal Outcome Evaluation:   Pt remains NPO.  Pt removed his Cortrak overnight.  Staff attempted to replace several times but were unsuccessful.  Sent additional referral to speech to reevaluate. Plan of care was reviewed and reinforcement needed d/t condition.         Progress: no change

## 2022-12-25 NOTE — PROGRESS NOTES
Pulmonary/critical care progress note     Nursing staff called due to concern for tachycardia and patient's breathing status.     Upon assessment, patient is tachycardic with a regular rhythm.  An EKG was performed which showed sinus tachycardia.  Patient is due for 25 mg metoprolol around 9 PM tonight.  This should help with his tachycardia and hypertension, patient is currently on nicardipine to maintain SBP less than 140.     Patient is mouth breathing, nasal cannula in place.  SPO2 is 96%.  He does sound tight in his upper lobes with some mild expiratory wheezes.  He does not have any as needed bronchodilators for wheezing/shortness of breath.  I added as needed bronchodilators, but these can be given after we get his heart rate under control.     Patient was alert and oriented upon assessment.  He was lethargic and slow to answer questions.  Of note, he is on 0.5 of Precedex.  He has not had an ABG since admission, I will go ahead and do this now just to rule out hypercapnia as a cause of his lethargy.     CC 26 minutes     KAYCE Soria

## 2022-12-26 NOTE — PROGRESS NOTES
[facility]    Pt remains confused and agitated.  Pt does not have adequate DC plan.  Will sign off at this time.     CCP notified.    Rizwana Charles RN  Acute Rehab Admission Nurse

## 2022-12-26 NOTE — PROGRESS NOTES
"                                              LOS: 6 days   Patient Care Team:  Adolfo Adame MD as PCP - General (Family Medicine)    Chief Complaint:  F/up subdural hemorrhage s/p craniotomy ICU care medical problems listed below.    Subjective   Interval History    Seems to be more alert and not agitated today.  He was taken off restraint.  He does not really have much complaints.  Slightly gurgly when he talks    REVIEW OF SYSTEMS:   Cardiovascular: No chest pain or palpitation  Gastrointestinal: No nausea or vomiting  Respiratory: No shortness of breath or cough    Ventilator/Non-Invasive Ventilation Settings (From admission, onward)    None                Physical Exam:     Vital Signs  Temp:  [97.7 °F (36.5 °C)-98.4 °F (36.9 °C)] 98.1 °F (36.7 °C)  Heart Rate:  [] 87  Resp:  [18-20] 18  BP: (144-202)/() 159/75    Intake/Output Summary (Last 24 hours) at 12/26/2022 1414  Last data filed at 12/26/2022 1302  Gross per 24 hour   Intake 1401 ml   Output --   Net 1401 ml     Flowsheet Rows    Flowsheet Row First Filed Value   Admission Height 182.9 cm (72\") Documented at 12/20/2022 1147   Admission Weight 80.7 kg (178 lb) Documented at 12/20/2022 1147          PPE used per hospital policy    General Appearance:   Alert,  cooperative, in no acute distress   ENMT:  Mallampati score 3, moist mucous membrane   Eyes:  Pupils equal and reactive to light. EOMI   Neck:   Trachea midline. No thyromegaly.   Lungs:    Slightly coarse bilaterally.    Heart:   Regular rhythm and normal rate, normal S1 and S2, no         murmur   Skin:   No rash or ecchymosis   Abdomen:     Soft. No tenderness. No HSM.   Neuro/psych:  Conscious, alert, oriented x3.  Mostly cooperative.  Moves all extremities with symmetrical strength.   Extremities:  No cyanosis, clubbing or edema.  Warm extremities and well-perfused          Results Review:        Results from last 7 days   Lab Units 12/26/22  0637 12/25/22  0341 12/24/22  1535 " 12/24/22  0835   SODIUM mmol/L 141 143  --  142   POTASSIUM mmol/L 3.7 3.9 3.9 3.9   CHLORIDE mmol/L 111* 115*  --  114*   CO2 mmol/L 23.4 19.0*  --  21.2*   BUN mg/dL 26* 23  --  28*   CREATININE mg/dL 0.71* 0.57*  --  0.61*   GLUCOSE mg/dL 135* 136*  --  164*   CALCIUM mg/dL 8.9 8.8  --  9.0     Results from last 7 days   Lab Units 12/20/22  1214   TROPONIN T ng/mL <0.010     Results from last 7 days   Lab Units 12/26/22  0637 12/25/22  0341 12/24/22  0835   WBC 10*3/mm3 12.06* 12.24* 13.50*   HEMOGLOBIN g/dL 11.6* 12.1* 11.6*   HEMATOCRIT % 32.5* 34.9* 34.1*   PLATELETS 10*3/mm3 272 261 226     Results from last 7 days   Lab Units 12/21/22  0349 12/20/22  1126   INR  1.11* 1.02   APTT seconds 26.2 23.2         I reviewed the patient's new clinical results.        Medication Review:   amLODIPine, 10 mg, Oral, Daily  digoxin, 250 mcg, Intravenous, Daily  enalaprilat, 1.25 mg, Intravenous, Q6H  ipratropium-albuterol, 3 mL, Nebulization, 4x Daily - RT  levETIRAcetam, 500 mg, Intravenous, Q12H  melatonin, 5 mg, Oral, Nightly  metoprolol tartrate, 25 mg, Oral, Q12H  QUEtiapine, 50 mg, Oral, Nightly  sodium chloride, 10 mL, Intravenous, Q12H        sodium chloride 0.9 % with KCl 40 mEq/L, 100 mL/hr, Last Rate: 100 mL/hr (12/26/22 1302)        Diagnostic imaging:  I personally and independently reviewed the following images:  CT head 12/21/22: Residual subdural hemorrhage.  Significant pneumocephalus.  Left-to-right midline shift, 8 mm    Assessment     1. Bilateral subdural hematoma, L>R.    Traumatic and secondary to anticoagulation, s/p craniotomy & evacuation 12/20  2. Brain swelling and midline shift  3. Delirium  4. Leukocytosis, likely stress-induced.  No evidence of infection  5. History of stroke, on anticoagulation with Plavix  6. Hyperglycemia  7. Pulmonary infiltrates,?  Probably Chronic scarring  8. Anemia, new on this admission.  No evidence of blood loss.  9. A. fib with mild RVR  10. Hyperchloremic  metabolic acidosis      Plan   · Start lisinopril 10 mg  · Change dig to p.o.  · Continue tube feeding.  Repeat swallow evaluation  · Seroquel 50 mg nightly  · Discontinue IV fluid as patient is currently receiving tube feeding  · Metoprolol 25 mg twice daily.  Can use Lopressor as needed if unable to take pills  · Head of bed 30 degree  · Keppra 500 mg twice daily for seizure prophylaxis for 1 week  · Insulin NovoLog PRN per accu checks  · DVT prophylaxis with SCD  · PT OT  · F/up with neurosurgery in 2-3 weeks.    Dispo: Awaiting speech evaluation.  Requires rehab    Dianne Kramer MD  12/26/22  14:14 EST          This note was dictated utilizing [employer] dictation

## 2022-12-26 NOTE — THERAPY TREATMENT NOTE
Patient Name: Mingo Aquino  : 1934    MRN: [id number]                              Today's Date: 2022       Admit Date: 2022    Visit Dx:     ICD-10-CM ICD-9-CM   1. 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)   • History of CVA (cerebrovascular accident)   • Lower urinary tract symptoms (LUTS)   • History of prostate cancer   • Subdural hematoma     Past Medical History:   Diagnosis Date   • Arrhythmia    • Cancer (HCC)     PROSTATE   • Colon polyps 2010    cecum: hyperplastic polyp; descending colon: hyperplastic polyp; sigmoid polyp: tubular adenoma; rectal polyps x2; hyerplastic polyps   • CVA (cerebral vascular accident) (HCC)    • Diverticulosis    • Hypertension    • Stroke (HCC)      Past Surgical History:   Procedure Laterality Date   • COLONOSCOPY N/A 2010    Pancolonic diverticular disease as well as five small sessile colon polyps-Dr. Cain Rush   • COLONOSCOPY N/A 2018    Procedure: COLONOSCOPY to CECUM;  Surgeon: Camacho Collier MD;  Location: [facility] ENDOSCOPY;  Service: Gastroenterology   • CRANIOTOMY FOR SUBDURAL HEMATOMA Left 2022    Procedure: CRANIOTOMY FOR SUBDURAL HEMATOMA;  Surgeon: Shawn Mccullough MD;  Location: [facility] MAIN OR;  Service: Neurosurgery;  Laterality: Left;   • INGUINAL HERNIA REPAIR Left 2006    Open repair of left inguinal hernia with mesh-Dr. Kofi Bravo   • NASAL SINUS SURGERY Bilateral 2017    Septoplasty, submucous resection, bilateral endoscopic image-guided anterior-posterior ethmoidectomy, bilateral endoscopic image-guided sphenoidotomy, bilateral  image-guided endoscopic maxillary antrostomy (with removal of tissue, right axillary sinus), bilateral endosopic image-guided nasofrontal duct dissection, left endoscopic enrique bullosa resection-Dr. Petar Luque   • PROSTATECTOMY        General Information     Row Name 12/26/22 1456          Physical Therapy Time and Intention    Document Type therapy note (daily note)  -     Mode of Treatment individual therapy;physical therapy  -     Row Name 12/26/22 1456          General Information    Patient Profile Reviewed yes  -     Existing Precautions/Restrictions fall   left craniotomy, NG tube  -     Barriers to Rehab medically complex  -     Row Name 12/26/22 1456          Cognition    Orientation Status (Cognition) oriented to;person;unable/difficult to assess  -     Row Name 12/26/22 1456          Safety Issues, Functional Mobility    Impairments Affecting Function (Mobility) balance;cognition;endurance/activity tolerance;strength  -     Comment, Safety Issues/Impairments (Mobility) Gait belt and nonslip socks used for safety  -           User Key  (r) = Recorded By, (t) = Taken By, (c) = Cosigned By    Initials Name Provider Type     Sahara Soto, PT Physical Therapist               Mobility     Row Name 12/26/22 1457          Bed Mobility    Bed Mobility supine-sit-supine;scooting/bridging  -     Scooting/Bridging [place] (Bed Mobility) other (see comments);moderate assist (50% patient effort)  sit scoot x 2-3 towards HOB on R  -     Supine-Sit-Supine [place] (Bed Mobility) verbal cues;moderate assist (50% patient effort);nonverbal cues (demo/gesture)  -     Assistive Device (Bed Mobility) head of bed elevated;bed rails  -     Row Name 12/26/22 1457          Sit-Stand Transfer    Sit-Stand [place] (Transfers) moderate assist (50% patient effort);2 person assist;verbal cues  -     Assistive Device (Sit-Stand Transfers) other (see comments)  -     Comment, (Sit-Stand  Transfer) HHA  -MW     Row Name 12/26/22 5337          Gait/Stairs (Locomotion)    [place] Level (Gait) minimum assist (75% patient effort);moderate assist (50% patient effort);2 person assist  -MW     Assistive Device (Gait) other (see comments)  HHA x 2  -MW     Distance in Feet (Gait) 3-4 side steps towards his R  -MW     Deviations/Abnormal Patterns (Gait) right sided deviations;festinating/shuffling;weight shifting decreased  -MW     Right Sided Gait Deviations weight shift ability decreased  -MW     Comment, (Gait/Stairs) Verbal cues to lift R foot higher off floor to take a side step  -MW           User Key  (r) = Recorded By, (t) = Taken By, (c) = Cosigned By    Initials Name Provider Type    Sahara Mallory PT Physical Therapist               Obj/Interventions     Row Name 12/26/22 1502          Balance    Balance Assessment sitting static balance;standing static balance  -MW     Static Sitting Balance supervision  -MW     Position, Sitting Balance sitting edge of bed;unsupported  -MW     Static Standing Balance minimal assist  -MW     Position/Device Used, Standing Balance supported;other (see comments)  HHA x 2  -MW     Balance Interventions sitting;standing;sit to stand;supported;static;dynamic  -MW           User Key  (r) = Recorded By, (t) = Taken By, (c) = Cosigned By    Initials Name Provider Type    Sahara Mlalory PT Physical Therapist               Goals/Plan    No documentation.                Clinical Impression     Row Name 12/26/22 1502          Pain    Pretreatment Pain Rating 0/10 - no pain  -MW     Posttreatment Pain Rating 0/10 - no pain  -MW     Row Name 12/26/22 1502          Plan of Care Review    Plan of Care Reviewed With patient  -MW     Progress improving  -MW     Outcome Evaluation Pt seen for PT tx this PM. He was asleep at start of session needing a little help to wake up. He agreed to work with PT and was able to perform bed mobility with Mod A. He sat with CG/Sup A,  lost balance x 1 towards his R. Performed STS with Mod A x 2 with both hands held for support. He was able to stand with 1 hand held for approx 5-10 sec while his brief was changed. He then took 3-4 side steps towards his R with Mod A x 2. Cont with Pt and progress with gait and transfers as tolerated.  -     Row Name 12/26/22 1504          Vital Signs    O2 Delivery Pre Treatment room air  -MW     O2 Delivery Intra Treatment room air  -MW     O2 Delivery Post Treatment room air  -MW     Pre Patient Position Supine  -MW     Intra Patient Position Standing  -MW     Post Patient Position Supine  -     Row Name 12/26/22 1504          Positioning and Restraints    Pre-Treatment Position in bed  -MW     Post Treatment Position bed  -MW     In Bed notified nsg;supine;with family/caregiver;with nsg;SCD pump applied  All lines intact, RN and friend present  -           User Key  (r) = Recorded By, (t) = Taken By, (c) = Cosigned By    Initials Name Provider Type     Sahara Soto, PT Physical Therapist               Outcome Measures     Row Name 12/26/22 1509 12/26/22 0800       How much help from another person do you currently need...    Turning from your back to your side while in flat bed without using bedrails? 3  -MW 3  -LC    Moving from lying on back to sitting on the side of a flat bed without bedrails? 2  -MW 3  -LC    Moving to and from a bed to a chair (including a wheelchair)? 2  -MW 1  -LC    Standing up from a chair using your arms (e.g., wheelchair, bedside chair)? 2  -MW 1  -LC    Climbing 3-5 steps with a railing? 1  -MW 1  -LC    To walk in hospital room? 2  -MW 1  -LC    AM-PAC 6 Clicks Score (PT) 12  -MW 10  -LC    Highest level of mobility 4 --> Transferred to chair/commode  -MW 4 --> Transferred to chair/commode  -LC    Row Name 12/26/22 1509          Modified [place] Scale    Modified [place] Scale 4 - Moderately severe disability.  Unable to walk without assistance, and unable to attend to own  bodily needs without assistance.  -     Row Name 12/26/22 1509          Functional Assessment    Outcome Measure Options AM-PAC 6 Clicks Basic Mobility (PT);Modified [place]  -           User Key  (r) = Recorded By, (t) = Taken By, (c) = Cosigned By    Initials Name Provider Type    Amber Rahman, RN Registered Nurse    Sahara Mallory, PT Physical Therapist                             Physical Therapy Education     Title: PT OT SLP Therapies (In Progress)     Topic: Physical Therapy (In Progress)     Point: Mobility training (Done)     Learning Progress Summary           Patient Acceptance, E, VU,NR by  at 12/26/2022 1510    Acceptance, E, NR by TS at 12/25/2022 1508    Eager, E, NR by KS at 12/24/2022 1553    Eager, E, NR by KS at 12/23/2022 1936    Acceptance, E, NR by  at 12/23/2022 1431   Other Acceptance, E, VU,NR by  at 12/26/2022 1510                   Point: Home exercise program (In Progress)     Learning Progress Summary           Patient Acceptance, E, NR by TS at 12/25/2022 1508    Eager, E, NR by KS at 12/24/2022 1553    Eager, E, NR by KS at 12/23/2022 1936    Acceptance, E, NR by  at 12/23/2022 1431                               User Key     Initials Effective Dates Name Provider Type Discipline     06/16/21 -  Lidia Gamble, PT Physical Therapist PT     06/16/21 -  Paulina Sauer, RN Registered Nurse Nurse     06/16/21 -  Sahara Soto PT Physical Therapist PT    KS 09/22/22 -  Madelyn Dias, RN Registered Nurse Nurse              PT Recommendation and Plan     Plan of Care Reviewed With: patient  Progress: improving  Outcome Evaluation: Pt seen for PT tx this PM. He was asleep at start of session needing a little help to wake up. He agreed to work with PT and was able to perform bed mobility with Mod A. He sat with CG/Sup A, lost balance x 1 towards his R. Performed STS with Mod A x 2 with both hands held for support. He was able to stand with 1 hand held for  approx 5-10 sec while his brief was changed. He then took 3-4 side steps towards his R with Mod A x 2. Cont with Pt and progress with gait and transfers as tolerated.     Time Calculation:    PT Charges     Row Name 12/26/22 1512             Time Calculation    Start Time 1425  -MW      Stop Time 1446  -MW      Time Calculation (min) 21 min  -MW      PT Received On 12/26/22  -MW      PT - Next Appointment 12/27/22  -MW         Time Calculation- PT    Total Timed Code Minutes- PT 19 minute(s)  -MW            User Key  (r) = Recorded By, (t) = Taken By, (c) = Cosigned By    Initials Name Provider Type    Sahara Mallory PT Physical Therapist              Therapy Charges for Today     Code Description Service Date Service Provider Modifiers Qty    53222503197  PT THER SUPP EA 15 MIN 12/26/2022 Sahara Soto PT GP 1    [id number]  PT THERAPEUTIC ACT EA 15 MIN 12/26/2022 Sahara Soto, PT GP 1          PT G-Codes  Outcome Measure Options: AM-PAC 6 Clicks Basic Mobility (PT), Modified [place]  AM-PAC 6 Clicks Score (PT): 12  Modified [place] Scale: 4 - Moderately severe disability.  Unable to walk without assistance, and unable to attend to own bodily needs without assistance.       Sahara Soto PT  12/26/2022

## 2022-12-26 NOTE — THERAPY RE-EVALUATION
Acute Care - Speech Language Pathology Re-Evaluation    [facility]     Patient Name: Mingo Aquino  : 1934  MRN: [id number]    Today's Date: 2022                   Admit Date: 2022       Visit Dx:      ICD-10-CM ICD-9-CM   1. 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)   • History of CVA (cerebrovascular accident)   • Lower urinary tract symptoms (LUTS)   • History of prostate cancer   • Subdural hematoma       Past Medical History:   Diagnosis Date   • Arrhythmia    • Cancer (HCC)     PROSTATE   • Colon polyps 2010    cecum: hyperplastic polyp; descending colon: hyperplastic polyp; sigmoid polyp: tubular adenoma; rectal polyps x2; hyerplastic polyps   • CVA (cerebral vascular accident) (HCC)    • Diverticulosis    • Hypertension    • Stroke (HCC)        Past Surgical History:   Procedure Laterality Date   • COLONOSCOPY N/A 2010    Pancolonic diverticular disease as well as five small sessile colon polyps-Dr. Cain Rush   • COLONOSCOPY N/A 2018    Procedure: COLONOSCOPY to CECUM;  Surgeon: Camacho Collier MD;  Location: [facility] ENDOSCOPY;  Service: Gastroenterology   • CRANIOTOMY FOR SUBDURAL HEMATOMA Left 2022    Procedure: CRANIOTOMY FOR SUBDURAL HEMATOMA;  Surgeon: Shawn Mccullough MD;  Location: [facility] MAIN OR;  Service: Neurosurgery;  Laterality: Left;   • INGUINAL HERNIA REPAIR Left 2006    Open repair of left inguinal hernia with mesh-Dr. Kofi Bravo   • NASAL SINUS SURGERY Bilateral 2017    Septoplasty, submucous resection, bilateral endoscopic image-guided anterior-posterior  ethmoidectomy, bilateral endoscopic image-guided sphenoidotomy, bilateral image-guided endoscopic maxillary antrostomy (with removal of tissue, right axillary sinus), bilateral endosopic image-guided nasofrontal duct dissection, left endoscopic enrique bullosa resection-Dr. Petar Luque   • PROSTATECTOMY         SLP Recommendation and Plan                                           Plan of Care Reviewed With: patient (12/26/22 0914)  Outcome Evaluation: Patient seen for re evaluation of swallow function. He just completed a breathing treatment. Able to follow some oral motor commands. Voice clear. Laryngeal elevation reduced with volitional swallow. Adequate bolus manipulate of ice. Wet voice, throat clearing, and cough with minimal trials. Pt exhibited difficulty clearing wetness. Further PO trials not yet appropriate. Will continue to follow. (12/26/22 0914)                       EDUCATION    The patient has been educated in the following areas:       Dysphagia (Swallowing Impairment).             SLP GOALS     Row Name 12/26/22 1200             (STG) Patient will tolerate trials of    Consistencies Trialed (Tolerate trials) pureed textures;thin liquids  -SH      Desired Outcome (Tolerate trials) without signs/symptoms of aspiration  -      [place] (Tolerate trials) independently (over 90% accuracy)  -      Progress/Outcomes (Tolerate trials) progress slower than expected  -SH      Comment (Tolerate trials) Patient seen for re evaluation of swallow function. He just completed a breathing treatment. Able to follow some oral motor commands. Voice clear. Laryngeal elevation reduced with volitional swallow. Adequate bolus manipulate of ice. Wet voice, throat clearing, and cough with minimal trials. Pt exhibited difficulty clearing wetness. Further PO trials not yet appropriate. Will continue to follow.  -SH            User Key  (r) = Recorded By, (t) = Taken By, (c) = Cosigned By    Initials Name Provider Type      Jolene Virgen MS CCC-SLP Speech and Language Pathologist                            Time Calculation:        Time Calculation- SLP     Row Name 12/26/22 1239             Time Calculation- SLP    SLP Start Time 0800  -      SLP Received On 12/26/22  -         Untimed Charges    10068-KM Treatment Swallow Minutes 45  -SH         Total Minutes    Untimed Charges Total Minutes 45  -SH       Total Minutes 45  -SH            User Key  (r) = Recorded By, (t) = Taken By, (c) = Cosigned By    Initials Name Provider Type    Jolene Gamble MS CCC-SLP Speech and Language Pathologist                  Therapy Charges for Today     Code Description Service Date Service Provider Modifiers Qty    84055451864 HC ST TREATMENT SWALLOW 3 12/26/2022 Jolene Virgen MS CCC-SLP GN 1                           Jolene Virgen MS CCC-HARPAL  12/26/2022

## 2022-12-26 NOTE — PLAN OF CARE
Pt restraints removed, pt tolerated being out of restraints. Pt tube feeds increased to goal. Pt in/out of SR/Afib throughout shift. Elevated BP treated effectively throughout shift. Pt A&Ox3 throughout shift.    Problem: Restraint, Nonbehavioral (Nonviolent)  Goal: Absence of Harm or Injury  Intervention: Implement Least Restrictive Safety Strategies  Recent Flowsheet Documentation  Taken 12/26/2022 1415 by Amber Adrian, RN  Medical Device Protection: IV pole/bag removed from visual field  Taken 12/26/2022 1200 by Amber Adrian, RN  Medical Device Protection: IV pole/bag removed from visual field  Taken 12/26/2022 1024 by Amber Adrian, RN  Medical Device Protection:   IV pole/bag removed from visual field   tubing secured  Less Restrictive Alternative:   bed alarm in use   calming techniques promoted   environment adjusted   safety enhancements provided   sensory stimulation limited  De-Escalation Techniques:   appropriate behavior reinforced   increased round frequency   stimulation decreased  Diversional Activities:   television   music  Taken 12/26/2022 1000 by Amber Adrian, RN  Medical Device Protection:   IV pole/bag removed from visual field   tubing secured  Less Restrictive Alternative:   bed alarm in use   calming techniques promoted   environment adjusted   safety enhancements provided   sensory stimulation limited  De-Escalation Techniques: stimulation decreased  Diversional Activities:   television   music  Taken 12/26/2022 0800 by Amber Adrian, RN  Medical Device Protection:   IV pole/bag removed from visual field   tubing secured  Less Restrictive Alternative:   bed alarm in use   calming techniques promoted   environment adjusted   safety enhancements provided   sensory stimulation limited  De-Escalation Techniques:   stimulation decreased   quiet time facilitated  Diversional Activities:   television   music     Problem: Fall Injury Risk  Goal: Absence of Fall and Fall-Related  Injury  Intervention: Promote Injury-Free Environment  Recent Flowsheet Documentation  Taken 12/26/2022 1618 by Amber Adrian, RN  Safety Promotion/Fall Prevention:   activity supervised   assistive device/personal items within reach   room organization consistent   safety round/check completed  Taken 12/26/2022 1415 by Amber Adrian, RN  Safety Promotion/Fall Prevention:   activity supervised   assistive device/personal items within reach   safety round/check completed   room organization consistent  Taken 12/26/2022 1200 by Amber Ardian RN  Safety Promotion/Fall Prevention:   activity supervised   assistive device/personal items within reach   room organization consistent   safety round/check completed  Taken 12/26/2022 1000 by Amber Adrian RN  Safety Promotion/Fall Prevention:   activity supervised   assistive device/personal items within reach   room organization consistent   safety round/check completed  Taken 12/26/2022 0800 by Amber Adrian, RN  Safety Promotion/Fall Prevention:   activity supervised   room organization consistent   safety round/check completed     Problem: Hypertension Comorbidity  Goal: Blood Pressure in Desired Range  Intervention: Maintain Blood Pressure Management  Recent Flowsheet Documentation  Taken 12/26/2022 0800 by Amber Adrian, RN  Medication Review/Management: medications reviewed   Goal Outcome Evaluation:

## 2022-12-26 NOTE — PLAN OF CARE
Goal Outcome Evaluation:  Plan of Care Reviewed With: patient        Progress: improving  Outcome Evaluation: Pt seen for PT tx this PM. He was asleep at start of session needing a little help to wake up. He agreed to work with PT and was able to perform bed mobility with Mod A. He sat with CG/Sup A, lost balance x 1 towards his R. Performed STS with Mod A x 2 with both hands held for support. He was able to stand with 1 hand held for approx 5-10 sec while his brief was changed. He then took 3-4 side steps towards his R with Mod A x 2. Cont with Pt and progress with gait and transfers as tolerated.  ..Patient was not wearing a face mask during this therapy encounter. Therapist used appropriate personal protective equipment including mask and gloves.  Mask used was standard procedure mask. Appropriate PPE was worn during the entire therapy session. Hand hygiene was completed before and after therapy session. Patient is not in enhanced droplet precautions.

## 2022-12-26 NOTE — PLAN OF CARE
Goal Outcome Evaluation:  Plan of Care Reviewed With: patient           Outcome Evaluation: Patient seen for re evaluation of swallow function. He just completed a breathing treatment. Able to follow some oral motor commands. Voice clear. Laryngeal elevation reduced with volitional swallow. Adequate bolus manipulate of ice. Wet voice, throat clearing, and cough with minimal trials. Pt exhibited difficulty clearing wetness. Further PO trials not yet appropriate. Will continue to follow.      Patient was not wearing a face mask during this therapy encounter. Therapist used appropriate personal protective equipment including mask, eye protection and gloves.  Mask used was standard procedure mask. Appropriate PPE was worn during the entire therapy session. Hand hygiene was completed before and after therapy session. Patient is not in enhanced droplet precautions.              None

## 2022-12-27 NOTE — PLAN OF CARE
Problem: Adult Inpatient Plan of Care  Goal: Plan of Care Review  Outcome: Ongoing, Progressing  Flowsheets  Taken 12/27/2022 0546 by Ashley Skelton RN  Plan of Care Reviewed With: patient  Outcome Evaluation: A&Ox3 forgetful to time and situation at times, restless and pulling at lines, pulled cortrak out, replaced and restraints put back on, patient frustrated with situation and restraints, hydralazine IV given twice through the night, heart rate ranges from  in afib, oral care done frequently, NIH 4, voiding well, plan of care continues.    Problem: Restraint, Nonbehavioral (Nonviolent)  Goal: Absence of Harm or Injury  Outcome: Ongoing, Progressing     Problem: Restraint, Nonbehavioral (Nonviolent)  Goal: Absence of Harm or Injury  Intervention: Implement Least Restrictive Safety Strategies  Recent Flowsheet Documentation  Taken 12/27/2022 0400 by Ashley Skelton RN  Medical Device Protection: tubing secured  Less Restrictive Alternative:   bed alarm in use   calming techniques promoted   sensory stimulation limited  De-Escalation Techniques:   appropriate behavior reinforced   increased round frequency   quiet time facilitated   reoriented   stimulation decreased  Diversional Activities: music  Taken 12/27/2022 0200 by Ashley Skelton, RN  Medical Device Protection: tubing secured  Less Restrictive Alternative:   bed alarm in use   calming techniques promoted   sensory stimulation limited  De-Escalation Techniques:   appropriate behavior reinforced   increased round frequency   quiet time facilitated   reoriented   stimulation decreased  Diversional Activities: music  Taken 12/27/2022 0025 by Ashley Skelton, RN  Medical Device Protection:   tubing secured   IV pole/bag removed from visual field  Less Restrictive Alternative:   bed alarm in use   calming techniques promoted   coping techniques promoted   emotional support provided   environment adjusted   sensory stimulation  limited  De-Escalation Techniques:   appropriate behavior reinforced   increased round frequency   quiet time facilitated   reoriented   stimulation decreased  Diversional Activities: music  Taken 12/26/2022 2015 by Ashley Skelton, RN  Diversional Activities:   television   music

## 2022-12-27 NOTE — DISCHARGE PLACEMENT REQUEST
"Mingo Aquino (88 y.o. Male)     Date of Birth   1934    Social Security Number       Address   [id number] [place] [address]    Home Phone   [phone]    MRN   [id number]       Scientology   Patient Refused    Marital Status   Single                            Admission Date   12/20/22    Admission Type   Emergency    Admitting Provider   Dianne Kramer MD    Attending Provider   Brandt Thapa MD    Department, Room/Bed   [address], P599/1       Discharge Date       Discharge Disposition       Discharge Destination                               Attending Provider: Brandt Thapa MD    Allergies: No Known Allergies    Isolation: None   Infection: None   Code Status: CPR    Ht: 182.9 cm (72\")   Wt: 87.5 kg (192 lb 14.4 oz)    Admission Cmt: None   Principal Problem: Subdural hematoma [S06.5XAA]                 Active Insurance as of 12/20/2022     Primary Coverage     Payor Plan Insurance Group Employer/Plan Group    [facility] MEDICARE REPLACEMENT [facility] MEDICARE REPLACEMENT [id number]     Payor Plan Address Payor Plan Phone Number Payor Plan Fax Number Effective Dates    [address] [address]   1/1/2018 - None Entered    [facility] [id number]       Subscriber Name Subscriber Birth Date Member ID       MINGO AQUINO 1934 [id number]                 Emergency Contacts      (Rel.) Home Phone Work Phone Mobile Phone    Alex Shultz (Other) [phone] -- --    ALEX SHULTZ (Friend) [phone] -- [phone]    Sierra Calix (Friend) -- -- [phone]            {Outbreak/Travel/Exposure Documentation......;  Question Available Choices Patient Response   COVID-19 Outbreak Screen:  Do you currently have a new onset of the following symptoms?        Fever/Chills, Cough, Shortness of air, Loss of taste or smell, No, Unknown  No (12/20/22 1444)   COVID-19 Outbreak Screen: In the last 14 days, have you had contact with anyone " who is ill, has show any of the symptoms listed above and/or has been diagnosis with the 2019 Novel Coronavirus? This includes any immediate household members but excludes any patients with whom you have been in contact within your normal work duties wearing proper PPE, if you are a healthcare worker.  Yes, No, Unknown              No (12/20/22 1594)   COVID-19 Outbreak Screen: Who was notified? Free text (not recorded)   Ebola Screening Outbreak Screen: Have you traveled to the [place] the [place] or [place] within the past 21 days?  Yes, No, Unknown No (12/20/22 1444)   Ebola Screening Outbreak Screen: Do you have ANY of the following symptoms: Fever/Chills, Vomiting, Diarrhea, Fatigue, Headache, Muscle pain, Unexplained bleeding, Abdominal (stomach) pain, No, Unknown (not recorded)   Ebola Screening Outbreak Screen: Name of Person notified Free text (not recorded)   Travel Screen: Have you traveled in the last month? If so, to what country have you traveled? If [place] what state? Yes, No, Unknown  List of all countries  List of all States No (12/20/22 1445)  (not recorded)  (not recorded)   Infection Risk: Do you currently have the following symptoms?  (If cough is selected, the Tuberculosis Screen is performed.) Cough, Fever, Rash, No No (12/20/22 1122)   Tuberculosis Screen: Do you have any of the following Tuberculosis Risks?  · Have you lived or spent time with anyone who had or may have TB?  · Have you lived in or visited any of the following areas for more than one month: [place], [place], [place], [place] or [place], the [place] or [place]?  · Do you have HIV/AIDS?  · Have you lived in or worked in a nursing home, homeless shelter, correctional facility, or substance abuse treatment facility?   · No    If Yes do you have any of the following symptoms? Yes responses display to the right    If Yes, symptoms listed are:  Cough greater than or equal to 3 weeks, Loss of appetite, Unexplained  weight loss, Night sweats, Bloody sputum or hemoptysis, Hoarseness, Fever, Fatigue, Chest pain, No (not recorded)  (not recorded)   Exposure Screen: Have you been exposed to any of these contagious diseases in the last month? Measles, Chickenpox, Meningitis, Pertussis, Whooping Cough, No No (12/20/22 9092)

## 2022-12-27 NOTE — CONSULTS
Patient Name: Mingo Aquino  :1934  88 y.o.    Date of Admission: 2022  Date of Consultation:  22  Encounter Provider: KAYCE Faulkner  Place of Service: [facility] CARDIOLOGY  Referring Provider: Dianne Kramer MD  Patient Care Team:  Adolfo Adame MD as PCP - General (Family Medicine)      Chief complaint: subdural hematoma    Reason for consultation: PVCs    History of Present Illness: Mr. Aquino is an 88 year old man with history of stroke, hypertension, and paroxysmal SVT. He was seen by Dr. Charles at [place] in January of this year following a recent stroke. He wore a monitor for 7 days that showed average Hr 68, rare isolated PVCs and occasional couplets and triplets. No SVT or AF detected. No changes made.     He presented to the [facility] emergency room on  after being found down by a friend. CT head demonstrated large left sided subdural hematoma with significant mass effect and midline shift. He was taken urgently for craniotomy and evacuation of subdural hematoma.     He was transferred to [facility]. He was noted to have PVCs for which we have been asked to evaluate.     Evidently Mr. Aquino had a rough night. He had to be restrained. This morning he was oriented and complained of a head ache. He was given tylenol.     Currently he is very drowsy. He did not wake up during my exam or as I was talking to his friend at bedside. He is sleeping soundly.       Past Medical History:   Diagnosis Date   • Arrhythmia    • Cancer (HCC)     PROSTATE   • Colon polyps 2010    cecum: hyperplastic polyp; descending colon: hyperplastic polyp; sigmoid polyp: tubular adenoma; rectal polyps x2; hyerplastic polyps   • CVA (cerebral vascular accident) (HCC)    • Diverticulosis    • Hypertension    • Stroke (HCC)        Past Surgical History:   Procedure Laterality Date   • COLONOSCOPY N/A 2010    Pancolonic diverticular disease  as well as five small sessile colon polyps-Dr. Cain Rush   • COLONOSCOPY N/A 5/23/2018    Procedure: COLONOSCOPY to CECUM;  Surgeon: Camacho Collier MD;  Location: [facility] ENDOSCOPY;  Service: Gastroenterology   • CRANIOTOMY FOR SUBDURAL HEMATOMA Left 12/20/2022    Procedure: CRANIOTOMY FOR SUBDURAL HEMATOMA;  Surgeon: Shawn Mccullough MD;  Location: [facility] MAIN OR;  Service: Neurosurgery;  Laterality: Left;   • INGUINAL HERNIA REPAIR Left 07/24/2006    Open repair of left inguinal hernia with mesh-Dr. Kofi Bravo   • NASAL SINUS SURGERY Bilateral 12/11/2017    Septoplasty, submucous resection, bilateral endoscopic image-guided anterior-posterior ethmoidectomy, bilateral endoscopic image-guided sphenoidotomy, bilateral image-guided endoscopic maxillary antrostomy (with removal of tissue, right axillary sinus), bilateral endosopic image-guided nasofrontal duct dissection, left endoscopic enrique bullosa resection-Dr. Petar Luque   • PROSTATECTOMY           Prior to Admission medications    Medication Sig Start Date End Date Taking? Authorizing Provider   aspirin 81 MG EC tablet Take 81 mg by mouth Daily.   Yes Aracelis Keen MD   atorvastatin (LIPITOR) 80 MG tablet TAKE 1 TABLET BY MOUTH EVERY NIGHT 7/25/22  Yes Adolfo Adame MD   clopidogrel (PLAVIX) 75 MG tablet TAKE 1 TABLET BY MOUTH DAILY 8/15/22  Yes Adolfo Adame MD   DIGOX 250 MCG tablet Take 250 mcg by mouth Daily. 5/29/18  Yes Aracelis Keen MD   metoprolol tartrate (LOPRESSOR) 25 MG tablet Take 25 mg by mouth 2 (Two) Times a Day.   Yes Aracelis Keen MD   Multiple Vitamin (MULTI-VITAMIN DAILY PO) Take 1 tablet by mouth Daily.   Yes Aracelis Keen MD   azelastine (ASTELIN) 0.1 % nasal spray USE 1 TO 2 SPRAYS IN EACH NOSTRIL TWICE DAILY AS NEEDED FOR RUNNY NOSE OR SNEEZING OR DRAINAGE 4/19/22   Aracelis Keen MD   cetirizine (zyrTEC) 10 MG tablet Take 1 tablet by mouth Daily. 5/24/18   Aracelis Keen  MD       No Known Allergies    Social History     Socioeconomic History   • Marital status: Single   Tobacco Use   • Smoking status: Never   • Smokeless tobacco: Never   Vaping Use   • Vaping Use: Never used   Substance and Sexual Activity   • Alcohol use: Yes     Comment: 3 drinks/week   • Drug use: No   • Sexual activity: Defer       Family History   Problem Relation Age of Onset   • Colon cancer Mother    • Kidney cancer Sister    • Malig Hyperthermia Neg Hx        REVIEW OF SYSTEMS:   All systems reviewed.  Pertinent positives identified in HPI.  All other systems are negative.      Objective:     Vitals:    12/27/22 1130 12/27/22 1301 12/27/22 1322 12/27/22 1326   BP: 154/87 152/88     BP Location: Right arm Right arm     Patient Position:  Lying     Pulse:  109 115 112   Resp:  18 18 18   Temp:  97.6 °F (36.4 °C)     TempSrc:  Oral     SpO2:  96% 96%    Weight:       Height:         Body mass index is 26.16 kg/m².    Physical Exam:  Constitutional: He is oriented to person, place, and time. He appears well-developed. He does not appear ill.   HENT:   Head: Normocephalic and atraumatic. Head is without contusion.   Right Ear: Hearing normal. No drainage.   Left Ear: Hearing normal. No drainage.   Nose: No nasal deformity. No epistaxis.   Eyes: Lids are normal. Right eye exhibits no exudate. Left eye exhibits no exudate.  Neck: No JVD present. Carotid bruit is not present. No tracheal deviation present. No thyroid mass and no thyromegaly present.   Cardiovascular: Normal rate, regular rhythm and normal heart sounds.    Pulses:       Posterior tibial pulses are 2+ on the right side, and 2+ on the left side.   Pulmonary/Chest: Effort normal and breath sounds normal.   Abdominal: Soft. Normal appearance and bowel sounds are normal. There is no tenderness.   Musculoskeletal: Normal range of motion.        Right shoulder: He exhibits no deformity.        Left shoulder: He exhibits no deformity.   Neurological: He is  alert and oriented to person, place, and time. He has normal strength.   Skin: Skin is warm, dry and intact. No rash noted.   Psychiatric: He has a normal mood and affect. His behavior is normal. Thought content normal.   Vitals reviewed      Lab Review:     Results from last 7 days   Lab Units 12/27/22  0621   SODIUM mmol/L 139   POTASSIUM mmol/L 3.8   CHLORIDE mmol/L 109*   CO2 mmol/L 23.0   BUN mg/dL 27*   CREATININE mg/dL 0.66*   CALCIUM mg/dL 9.4   GLUCOSE mg/dL 150*         Results from last 7 days   Lab Units 12/27/22  0621   WBC 10*3/mm3 14.03*   HEMOGLOBIN g/dL 13.1   HEMATOCRIT % 37.7   PLATELETS 10*3/mm3 389     Results from last 7 days   Lab Units 12/21/22  0349   INR  1.11*   APTT seconds 26.2     Results from last 7 days   Lab Units 12/27/22  1513   MAGNESIUM mg/dL 2.2     Results from last 7 days   Lab Units 12/21/22  0349   CHOLESTEROL mg/dL 121   TRIGLYCERIDES mg/dL 88   HDL CHOL mg/dL 40   LDL CHOL mg/dL 64                       Current Facility-Administered Medications:   •  acetaminophen (TYLENOL) tablet 650 mg, 650 mg, Nasogastric, Q4H PRN, Brandt Thapa MD, 650 mg at 12/27/22 1319  •  amLODIPine (NORVASC) tablet 10 mg, 10 mg, Oral, Daily, Demar Abreu Jr., MD, 10 mg at 12/27/22 0821  •  digoxin (LANOXIN) injection 250 mcg, 250 mcg, Intravenous, Daily, Dianne Kramer MD, 250 mcg at 12/27/22 1135  •  docusate sodium (COLACE) liquid 100 mg, 100 mg, Nasogastric, BID PRN, Shawn Mccullough MD  •  hydrALAZINE (APRESOLINE) injection 20 mg, 20 mg, Intravenous, Q6H PRN, Demar Abreu Jr., MD, 20 mg at 12/27/22 1135  •  ipratropium-albuterol (DUO-NEB) nebulizer solution 3 mL, 3 mL, Nebulization, 4x Daily - RT, Tanvi Dewey, KAYCE, 3 mL at 12/27/22 1322  •  labetalol (NORMODYNE,TRANDATE) injection 20-80 mg, 20-80 mg, Intravenous, Q10 Min PRN, Demar Abreu Jr., MD, 80 mg at 12/26/22 0103  •  levETIRAcetam (KEPPRA) injection 500 mg, 500 mg, Intravenous, Q12H, Shawn Mccullough MD,  500 mg at 12/27/22 0821  •  lisinopril (PRINIVIL,ZESTRIL) tablet 10 mg, 10 mg, Oral, Q24H, Dianne Kramer MD, 10 mg at 12/27/22 0821  •  melatonin tablet 5 mg, 5 mg, Oral, Nightly, Dianne Kramer MD, 5 mg at 12/26/22 2000  •  metoprolol tartrate (LOPRESSOR) injection 5 mg, 5 mg, Intravenous, Q4H PRN, Tanvi Dewey, APRN  •  metoprolol tartrate (LOPRESSOR) tablet 25 mg, 25 mg, Oral, Q12H, Dianne Kramer MD, 25 mg at 12/27/22 0821  •  morphine injection 2 mg, 2 mg, Intravenous, Q2H PRN, 2 mg at 12/26/22 2139 **AND** naloxone (NARCAN) injection 0.4 mg, 0.4 mg, Intravenous, Q5 Min PRN, Shawn Mccullough MD  •  ondansetron (ZOFRAN) tablet 4 mg, 4 mg, Oral, Q6H PRN **OR** ondansetron (ZOFRAN) injection 4 mg, 4 mg, Intravenous, Q6H PRN, Shawn Mccullough MD  •  QUEtiapine (SEROquel) tablet 50 mg, 50 mg, Oral, Nightly, Dianne Kramer MD, 50 mg at 12/26/22 2000  •  sennosides-docusate (PERICOLACE) 8.6-50 MG per tablet 1 tablet, 1 tablet, Oral, Nightly PRN, Shawn Mccullough MD  •  sodium chloride 0.9 % flush 10 mL, 10 mL, Intravenous, PRN, Shawn Mccullough MD  •  sodium chloride 0.9 % flush 10 mL, 10 mL, Intravenous, Q12H, Shawn Mccullough MD, 10 mL at 12/27/22 0821  •  sodium chloride 0.9 % flush 10 mL, 10 mL, Intravenous, PRN, Shawn Mccullough MD  •  sodium chloride 0.9 % infusion 40 mL, 40 mL, Intravenous, PRN, Todnem, Shawn, MD    Assessment and Plan:       Active Hospital Problems    Diagnosis  POA   • **Subdural hematoma [S06.5XAA]  Yes      Resolved Hospital Problems   No resolved problems to display.     1.subdural hematoma - status post craniotomy and hematoma evacuation on 12/20/2022. Reported new head ache this morning. Mentation is waxing and waning (required restraints overnight). Last time he was evaluated by JOHN he was alert alert and oriented. Will ask them to review again. Spoke with KACYE Morgan.     2. Ventricular ectopy - he wore a holter in February that demonstrated occasional  couplets and and triplets. Will check TSH and magnesium.  He has some diffuse ST T changes. Will check an echo.     3. Hypertension    4. History of stroke    5. History of paroxysmal SVT, follows with Dr. Charlse at [facility].     Margi Lara, APRN  12/27/22  16:22 EST

## 2022-12-27 NOTE — CASE MANAGEMENT/SOCIAL WORK
Continued Stay Note  [facility]     Patient Name: Mingo Aquino  MRN: [id number]  Today's Date: 12/27/2022    Admit Date: 12/20/2022    Plan: SNF- referrals pending   Discharge Plan     Row Name 12/27/22 1458       Plan    Plan SNF- referrals pending    Patient/Family in Agreement with Plan yes    Plan Comments CCP spoke with patient's POARamez t ofollow up on SNF choices. Ramez chose- in order of preference- [facility], [facility], [facility], [facility] and [facility]. CCP placed referrals and will continue to follow. Patient will need a pre cert. Margaret HUITRON RN CCP               Discharge Codes    No documentation.               Expected Discharge Date and Time     Expected Discharge Date Expected Discharge Time    Dec 28, 2022             Margaret Hoyos RN

## 2022-12-27 NOTE — CASE MANAGEMENT/SOCIAL WORK
Continued Stay Note  [facility]     Patient Name: Mingo Aquino  MRN: [id number]  Today's Date: 12/26/2022    Admit Date: 12/20/2022    Plan: Follow up with patient and his Healthcare Surrogate to obtain SNF choices.    Discharge Plan     Row Name 12/26/22 1911       Plan    Plan Follow up with patient and his Healthcare Surrogate to obtain SNF choices.    Plan Comments Noted that patient’s restraints were removed. Spoke to the patient’s Healthcare Surrogate Ramez Shultz [phone] (POA documents in Epic) and he was informed that [facility] acute rehab signed off on the patient due to confusion and no adequate d/c plan. Ramez was emailed a HH/SNF list, the link to access the nursing home compare list from Medicare.gov and the contact number for CCP in order to provide CCP with their top 5 SNF choices. CCP will follow up with the patient and his Healthcare Surrogate to obtain SNF choices. TOMMY DANIELSON               Discharge Codes    No documentation.               Expected Discharge Date and Time     Expected Discharge Date Expected Discharge Time    Dec 28, 2022             TOMMY Kramer

## 2022-12-28 PROBLEM — I48.92 ATRIAL FLUTTER (HCC): Status: ACTIVE | Noted: 2022-01-01

## 2022-12-28 PROBLEM — G93.40 ENCEPHALOPATHY: Status: ACTIVE | Noted: 2022-01-01

## 2022-12-28 PROBLEM — R73.9 HYPERGLYCEMIA: Status: ACTIVE | Noted: 2022-01-01

## 2022-12-28 NOTE — SIGNIFICANT NOTE
12/28/22 1552   OTHER   Discipline physical therapist   Rehab Time/Intention   Session Not Performed patient unavailable for treatment  (elevated resting HR 130s, will follow up once stable, RN , pt and family aware.)   Recommendation   PT - Next Appointment 12/29/22

## 2022-12-28 NOTE — PLAN OF CARE
Goal Outcome Evaluation:  Plan of Care Reviewed With: patient        Progress: no change  Outcome Evaluation: Alert but confused, follows commands, restless at times, in Afib with HR ranging 90's-130's, prn Hydralazine given x 1 for elevated BP.  Will continue to monitor.

## 2022-12-28 NOTE — PROGRESS NOTES
"    Name: Mingo Aquino ADMIT: 2022   : 1934  PCP: Adolfo Adame MD    MRN: [id number] LOS: 8 days   AGE/SEX: 88 y.o. male  ROOM: [facility]     Subjective   Subjective   Chart reviewed.  Patient has been transferred to the floor for several days but we were just asked to assume care today as primary.  Patient states that he is hearing what he describes as \"music\" as well as some voices which are not there.  Apparently has been more confused the last couple days per family at bedside.  Also now in atrial flutter    Review of Systems     Objective   Objective   Vital Signs  Temp:  [97.6 °F (36.4 °C)-97.9 °F (36.6 °C)] 97.9 °F (36.6 °C)  Heart Rate:  [] 132  Resp:  [18-28] 28  BP: (109-166)/(69-98) 126/83  SpO2:  [94 %-100 %] 96 %  on   ;   Device (Oxygen Therapy): room air  Body mass index is 22.99 kg/m².  Physical Exam  Vitals reviewed.   Constitutional:       General: He is not in acute distress.     Appearance: He is ill-appearing.   Eyes:      General: No scleral icterus.  Cardiovascular:      Rate and Rhythm: Tachycardia present. Rhythm irregular.   Pulmonary:      Effort: Pulmonary effort is normal. No respiratory distress.      Breath sounds: No wheezing.   Abdominal:      General: Bowel sounds are normal. There is no distension.      Palpations: Abdomen is soft.      Tenderness: There is no abdominal tenderness.   Musculoskeletal:      Right lower leg: No edema.      Left lower leg: No edema.   Skin:     General: Skin is warm and dry.      Findings: No rash.      Comments: Craniotomy incision looks intact   Neurological:      Mental Status: He is alert. He is disoriented.   Psychiatric:      Comments: Anxious         Results Review     I reviewed the patient's new clinical results.  Results from last 7 days   Lab Units 22  0621 22  0637 22  0341 22  0835   WBC 10*3/mm3 14.03* 12.06* 12.24* 13.50*   HEMOGLOBIN g/dL 13.1 11.6* 12.1* 11.6*   PLATELETS 10*3/mm3 389 " 272 261 226     Results from last 7 days   Lab Units 12/27/22  0621 12/26/22  0637 12/25/22  0341 12/24/22  1535 12/24/22  0835   SODIUM mmol/L 139 141 143  --  142   POTASSIUM mmol/L 3.8 3.7 3.9 3.9 3.9   CHLORIDE mmol/L 109* 111* 115*  --  114*   CO2 mmol/L 23.0 23.4 19.0*  --  21.2*   BUN mg/dL 27* 26* 23  --  28*   CREATININE mg/dL 0.66* 0.71* 0.57*  --  0.61*   GLUCOSE mg/dL 150* 135* 136*  --  164*   EGFR mL/min/1.73 90.2 88.2 94.3  --  92.4       Results from last 7 days   Lab Units 12/27/22  1513 12/27/22  0621 12/26/22  0637 12/25/22  0341 12/24/22  1535 12/24/22  0835   CALCIUM mg/dL  --  9.4 8.9 8.8  --  9.0   MAGNESIUM mg/dL 2.2  --   --   --  2.3  --        Glucose   Date/Time Value Ref Range Status   12/28/2022 1603 112 70 - 130 mg/dL Final     Comment:     Meter: [id number] : [id number] Pelayojd MorinPaula    12/28/2022 1231 122 70 - 130 mg/dL Final     Comment:     Meter: [id number] : [id number] [facility]   12/28/2022 0650 152 (H) 70 - 130 mg/dL Final     Comment:     Meter: [id number] : [id number] Taylor Maria Dolores [facility]   12/28/2022 0012 148 (H) 70 - 130 mg/dL Final     Comment:     Meter: [id number] : [id number] Brandon Maria Dolores [facility]   12/27/2022 1707 116 70 - 130 mg/dL Final     Comment:     Meter: [id number] : [id number] [facility]   12/27/2022 1130 129 70 - 130 mg/dL Final     Comment:     Meter: [id number] : [id number] [facility]   12/27/2022 0623 155 (H) 70 - 130 mg/dL Final     Comment:     Meter: [id number] : [id number] Elizabeth SANTOS       CT Head Without Contrast    Result Date: 12/28/2022  A heterogeneous epidural fluid collection underlying the craniotomy flap has decreased in size. High attenuation blood products overlying the left parietal and occipital lobes are again appreciated decreased in attenuation. The parietal component has decreased in size. The parietooccipital and occipital component appear similar as does a small  low-attenuation subdural related to the undersurface of the tentorium cerebelli on the left posteriorly. The high attenuation blood products which were present centrally overlying the right frontal lobe have resolved. There is an enlarging low-attenuation subdural fluid collection overlying the right frontal lobe superior laterally consistent with a hygroma. This currently measures approximately 7 mm in thickness as measured perpendicular to the inner table on the coronal reconstructions. It measured 4-5 mm in thickness on the CT examination of 12/21/2022. Continued surveillance is recommended. There is no evidence of acute infarction or of acute intracranial hemorrhage. Edema, soft tissue prominence and fluid overlying the craniotomy flap on the left are more prominent as compared to 12/21/2022 currently measuring approximately 17 mm in thickness, previously 10 mm.   Radiation dose reduction techniques were utilized, including automated exposure control and exposure modulation based on body size.  This report was finalized on 12/28/2022 4:26 PM by Dr. Sylvester Maynard M.D.      Scheduled Medications  amLODIPine, 10 mg, Oral, Daily  digoxin, 250 mcg, Intravenous, Daily  ipratropium-albuterol, 3 mL, Nebulization, 4x Daily - RT  levETIRAcetam, 500 mg, Intravenous, Q12H  lisinopril, 10 mg, Oral, Q24H  melatonin, 5 mg, Oral, Nightly  metoprolol tartrate, 25 mg, Oral, Q12H  sodium chloride, 10 mL, Intravenous, Q12H    Infusions  dilTIAZem HCl-Sodium Chloride, 5-15 mg/hr, Last Rate: 5 mg/hr (12/28/22 1654)    Diet  NPO Diet NPO Type: Strict NPO       Assessment/Plan     Active Hospital Problems    Diagnosis  POA   • **Subdural hematoma [S06.5XAA]  Yes   • Atrial flutter (HCC) [I48.92]  No   • Encephalopathy [G93.40]  Unknown   • Hyperglycemia [R73.9]  Unknown   • Benign essential hypertension [I10]  Yes      Resolved Hospital Problems   No resolved problems to display.       88 y.o. male admitted with Subdural hematoma  status post craniotomy 12/20    Confusion/encephalopathy-suspect metabolic cause or potentially just delirium related to brain surgery and ICU/hospitalization  -Hallucinations have just developed in the last 24 hours.  He has been getting Seroquel for the last 4 days.  We will stop this and see if these resolve  - Ordering some infectious work-up with [facility] as well.  WBC was up at last check yesterday.  Will repeat in a.m.  - Repeat CT today noted.  Decrease in the size of the bleeding areas but some increase in hygroma    A flutter with current uncontrolled rate.  Being started on diltiazem drip by cardiology.  Cannot start AC until cleared by neurosurgery    Dysphagia related to above.  Await speech reeval.  Core track was accidentally dislodged this afternoon, will leave it out until they see him tomorrow.  We will likely refer for PEG if still unable to swallow soon    SCDs for DVT prophylaxis.  We will assume care as primary.  Discussed with wife  Disposition uncertain at this time.  Will certainly need rehab      Edgar Ye MD  [facility]  12/28/22  16:55 EST

## 2022-12-28 NOTE — PLAN OF CARE
Problem: Adult Inpatient Plan of Care  Goal: Plan of Care Review  Outcome: Ongoing, Progressing  Goal: Patient-Specific Goal (Individualized)  Outcome: Ongoing, Progressing     Problem: Fall Injury Risk  Goal: Absence of Fall and Fall-Related Injury  Outcome: Ongoing, Progressing  Intervention: Identify and Manage Contributors  Recent Flowsheet Documentation  Taken 12/28/2022 1802 by Magdalene Peña RN  Medication Review/Management: medications reviewed  Taken 12/28/2022 1610 by Magdalene Peña RN  Medication Review/Management: medications reviewed  Taken 12/28/2022 1413 by Magdalene Peña RN  Medication Review/Management: medications reviewed  Taken 12/28/2022 1209 by Magdalene Peña RN  Medication Review/Management: medications reviewed  Taken 12/28/2022 1043 by Magdalene Peña RN  Medication Review/Management: medications reviewed  Taken 12/28/2022 0812 by Magdalene Peña RN  Medication Review/Management: medications reviewed  Intervention: Promote Injury-Free Environment  Recent Flowsheet Documentation  Taken 12/28/2022 1802 by Magdalene Peña RN  Safety Promotion/Fall Prevention: safety round/check completed  Taken 12/28/2022 1610 by Magdalene Peña RN  Safety Promotion/Fall Prevention: safety round/check completed  Taken 12/28/2022 1413 by Magdalene Peña RN  Safety Promotion/Fall Prevention: safety round/check completed  Taken 12/28/2022 1209 by Mgadalene Peña RN  Safety Promotion/Fall Prevention:   toileting scheduled   safety round/check completed  Taken 12/28/2022 1043 by Magdalene Peña RN  Safety Promotion/Fall Prevention: safety round/check completed  Taken 12/28/2022 0812 by Magdalene Peña RN  Safety Promotion/Fall Prevention: safety round/check completed     Problem: Hypertension Comorbidity  Goal: Blood Pressure in Desired Range  Outcome: Ongoing, Progressing  Intervention: Maintain Blood Pressure Management  Recent Flowsheet Documentation  Taken 12/28/2022 1802 by  Magdalene Peña RN  Medication Review/Management: medications reviewed  Taken 12/28/2022 1610 by Magdalene Peña RN  Medication Review/Management: medications reviewed  Taken 12/28/2022 1413 by Magdalene Peña RN  Medication Review/Management: medications reviewed  Taken 12/28/2022 1209 by Magdalene Peña RN  Medication Review/Management: medications reviewed  Taken 12/28/2022 1043 by Magdalene Peña RN  Medication Review/Management: medications reviewed  Taken 12/28/2022 0812 by Magdalene Peña RN  Medication Review/Management: medications reviewed     Problem: Skin Injury Risk Increased  Goal: Skin Health and Integrity  Outcome: Ongoing, Progressing     Problem: Restraint, Nonbehavioral (Nonviolent)  Goal: Absence of Harm or Injury  Outcome: Ongoing, Progressing  Intervention: Implement Least Restrictive Safety Strategies  Recent Flowsheet Documentation  Taken 12/28/2022 0812 by Magdalene Peña RN  Medical Device Protection:   torso covered   tubing secured  Diversional Activities: television  Intervention: Protect Dignity, Rights, and Personal Wellbeing  Recent Flowsheet Documentation  Taken 12/28/2022 0812 by Magdalene Peña RN  Trust Relationship/Rapport:   care explained   choices provided  Intervention: Protect Skin and Joint Integrity  Recent Flowsheet Documentation  Taken 12/28/2022 0812 by Magdalene Peña RN  Range of Motion: active ROM (range of motion) encouraged   Goal Outcome Evaluation:   Pt on cardizem gtt @ 15ml/hr. HR in the 120's Afib/AFL cardio aware. SBP remains under 150. Pt dislodged MD virgil aware and SLP to reeval tomorrow.

## 2022-12-28 NOTE — PROGRESS NOTES
LOS: 8 days   Patient Care Team:  Adolfo Adame MD as PCP - General (Family Medicine)    Chief Complaint: Follow-up paroxysmal atrial fibrillation, typical atrial flutter, PVCs.    Interval History: He mainly had been in atrial fibrillation, but now has converted to typical atrial flutter with a heart rate in the low 130s.  He does not feel this, and is asymptomatic.  He is getting better from a mental status standpoint.  No chest pain.    Vital Signs:  Temp:  [97.6 °F (36.4 °C)-97.9 °F (36.6 °C)] 97.9 °F (36.6 °C)  Heart Rate:  [] 132  Resp:  [18-28] 28  BP: (109-166)/(69-98) 126/83    Intake/Output Summary (Last 24 hours) at 12/28/2022 1651  Last data filed at 12/28/2022 1514  Gross per 24 hour   Intake 1311 ml   Output 50 ml   Net 1261 ml       Physical Exam:   General Appearance:    No acute distress, alert and oriented x4, status post craniotomy   Lungs:     Clear to auscultation bilaterally     Heart:    Regular rhythm and tachycardic rate.  No murmurs, gallops, or rubs.   Abdomen:     Soft, nontender, nondistended.    Extremities:   No clubbing, cyanosis, or edema.     Results Review:    Results from last 7 days   Lab Units 12/27/22  0621   SODIUM mmol/L 139   POTASSIUM mmol/L 3.8   CHLORIDE mmol/L 109*   CO2 mmol/L 23.0   BUN mg/dL 27*   CREATININE mg/dL 0.66*   GLUCOSE mg/dL 150*   CALCIUM mg/dL 9.4     Results from last 7 days   Lab Units 12/27/22  1513   TROPONIN T ng/mL <0.010     Results from last 7 days   Lab Units 12/27/22  0621   WBC 10*3/mm3 14.03*   HEMOGLOBIN g/dL 13.1   HEMATOCRIT % 37.7   PLATELETS 10*3/mm3 389             Results from last 7 days   Lab Units 12/27/22  1513   MAGNESIUM mg/dL 2.2           I reviewed the patient's new clinical results.        Assessment:  1.  Bilateral subdural hematoma, status post craniotomy and hematoma evacuation on 12/20/2022.  Secondary to trauma and antiplatelet therapy.  2.  Intermittent altered mental status and delirium  3.  Paroxysmal atrial  fibrillation with RVR  4.  Typical atrial flutter with RVR  5.  Asymptomatic PVCs  6.  Leukocytosis, likely stress-induced  7.  History of stroke, on outpatient Plavix  8.  History of paroxysmal SVT  9.  Hypertension    Plan:  -Again, he is currently in rapid atrial flutter, which is going to be more difficult to control.  From what I can tell, the atrial fibrillation and atrial flutter both appear to be new.  He obviously is not a good candidate for any type of anticoagulation given the recent events.    -I started him on a Cardizem drip to see if we can rate control.  He is getting digoxin 250 mcg IV every day.  I will check a digoxin level tomorrow.  I would continue the metoprolol 25 mg every 12 hours for now.  Again, the atrial flutter may be difficult to rate control, we will have to see how he does.    -He is not a good candidate for cardioversion given that he cannot take anticoagulation currently.    -Echocardiogram reviewed.  Ejection fraction was greater than 70%.    Franklyn Baker MD  12/28/22  16:51 EST

## 2022-12-29 PROBLEM — R13.12 OROPHARYNGEAL DYSPHAGIA: Status: ACTIVE | Noted: 2022-01-01

## 2022-12-29 NOTE — PLAN OF CARE
Goal Outcome Evaluation:      Patient remains tachycardic on Cardizem gtt. Patient oriented to self, very restless and uncooperative since start of shift. Frequent reorientation, redirection and activity provided. Patient restless and had started becoming more agitated with staff and started pulling his IV access. Restraint placed for safety. POA called, voicemail left. Patient awake and restless all night. He remains NPO pending eval today. Will continue to monitor.              Problem: Adult Inpatient Plan of Care  Goal: Plan of Care Review  Outcome: Ongoing, Progressing  Goal: Patient-Specific Goal (Individualized)  Outcome: Ongoing, Progressing     Problem: Fall Injury Risk  Goal: Absence of Fall and Fall-Related Injury  Outcome: Ongoing, Progressing  Intervention: Identify and Manage Contributors  Recent Flowsheet Documentation  Taken 12/28/2022 1945 by Kenyatta Goetz RN  Medication Review/Management: medications reviewed  Intervention: Promote Injury-Free Environment  Recent Flowsheet Documentation  Taken 12/29/2022 0400 by Kenyatta Goetz RN  Safety Promotion/Fall Prevention: safety round/check completed  Taken 12/29/2022 0200 by Kenyatta Goetz RN  Safety Promotion/Fall Prevention: safety round/check completed  Taken 12/29/2022 0000 by Kenyatta Goetz RN  Safety Promotion/Fall Prevention: safety round/check completed  Taken 12/28/2022 2200 by Kenyatta Goetz RN  Safety Promotion/Fall Prevention: safety round/check completed  Taken 12/28/2022 1945 by Kenyatta Goetz RN  Safety Promotion/Fall Prevention:  • assistive device/personal items within reach  • fall prevention program maintained  • nonskid shoes/slippers when out of bed  • safety round/check completed     Problem: Hypertension Comorbidity  Goal: Blood Pressure in Desired Range  Outcome: Ongoing, Progressing  Intervention: Maintain Blood Pressure Management  Recent Flowsheet Documentation  Taken 12/28/2022 1945 by Kenyatta Goetz  RN  Medication Review/Management: medications reviewed     Problem: Skin Injury Risk Increased  Goal: Skin Health and Integrity  Outcome: Ongoing, Progressing  Intervention: Optimize Skin Protection  Recent Flowsheet Documentation  Taken 12/29/2022 0400 by Kenyatta Goetz RN  Head of Bed (HOB) Positioning: HOB at 15 degrees  Taken 12/29/2022 0200 by Kenyatta Goetz RN  Head of Bed (HOB) Positioning: HOB at 15 degrees  Taken 12/29/2022 0000 by Kenyatta Goetz RN  Head of Bed (HOB) Positioning: HOB at 15 degrees  Taken 12/28/2022 2200 by Kenyatta Goetz RN  Head of Bed (HOB) Positioning: HOB at 15 degrees  Taken 12/28/2022 1945 by Kenyatta Goetz RN  Pressure Reduction Techniques: weight shift assistance provided  Head of Bed (HOB) Positioning: HOB at 30-45 degrees  Pressure Reduction Devices: alternating pressure pump (ADD)     Problem: Restraint, Nonbehavioral (Nonviolent)  Goal: Absence of Harm or Injury  Outcome: Ongoing, Progressing  Intervention: Implement Least Restrictive Safety Strategies  Recent Flowsheet Documentation  Taken 12/29/2022 0400 by Kenyatta Goetz RN  Medical Device Protection: tubing secured  Less Restrictive Alternative:  • bed alarm in use  • calming techniques promoted  • counseling provided  • emotional support provided  • safety enhancements provided  • positive reinforcement provided  • sensory stimulation limited  De-Escalation Techniques:  • quiet time facilitated  • reoriented  • stimulation decreased  • verbally redirected  Diversional Activities: television  Taken 12/29/2022 0200 by Kenyatta Goetz RN  Medical Device Protection: tubing secured  Less Restrictive Alternative:  • bed alarm in use  • calming techniques promoted  • counseling provided  • emotional support provided  • safety enhancements provided  • positive reinforcement provided  • sensory stimulation limited  De-Escalation Techniques:  • quiet time facilitated  • reoriented  • stimulation decreased  • verbally  redirected  Diversional Activities: television  Taken 12/29/2022 0000 by Kenyatta Goetz RN  Medical Device Protection: tubing secured  Less Restrictive Alternative:  • bed alarm in use  • calming techniques promoted  • counseling provided  • emotional support provided  • safety enhancements provided  • positive reinforcement provided  • sensory stimulation limited  De-Escalation Techniques:  • quiet time facilitated  • reoriented  • stimulation decreased  • verbally redirected  Diversional Activities: (decrease stimulation) other (see comments)  Taken 12/28/2022 2350 by Kenyatta Goetz RN  Medical Device Protection: tubing secured  Less Restrictive Alternative:  • bed alarm in use  • calming techniques promoted  • counseling provided  • emotional support provided  • safety enhancements provided  • positive reinforcement provided  • sensory stimulation limited  De-Escalation Techniques:  • appropriate behavior reinforced  • diversional activity encouraged  • increased round frequency  • medication administered  • physical activity promoted  • quiet time facilitated  • reoriented  • stimulation decreased  • verbally redirected  Diversional Activities: television  Taken 12/28/2022 1945 by Kenyatta Goetz RN  Medical Device Protection: tubing secured  Intervention: Protect Dignity, Rights, and Personal Wellbeing  Recent Flowsheet Documentation  Taken 12/29/2022 0000 by Kenyatta Goetz RN  Trust Relationship/Rapport: care explained  Intervention: Protect Skin and Joint Integrity  Recent Flowsheet Documentation  Taken 12/29/2022 0400 by Kenyatta Goetz RN  Body Position:  • weight shifting  • position changed independently  Taken 12/29/2022 0200 by Kenyatta Goetz RN  Body Position:  • position changed independently  • weight shifting  Taken 12/29/2022 0000 by Kenyatta Goetz RN  Body Position: position changed independently  Taken 12/28/2022 2200 by Kenyatta Goetz RN  Body Position: position changed  independently  Taken 12/28/2022 1945 by Kenyatta Goetz, RN  Body Position: sitting up in bed

## 2022-12-29 NOTE — THERAPY RE-EVALUATION
Acute Care - Speech Language Pathology   Swallow Re-Evaluation [facility]     Patient Name: Mingo Aquino  : 1934  MRN: [id number]  Today's Date: 2022               Admit Date: 2022    Visit Dx:     ICD-10-CM ICD-9-CM   1. 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)   • History of CVA (cerebrovascular accident)   • Lower urinary tract symptoms (LUTS)   • History of prostate cancer   • Subdural hematoma   • Atrial flutter (HCC)   • Encephalopathy   • Hyperglycemia     Past Medical History:   Diagnosis Date   • Arrhythmia    • Cancer (HCC)     PROSTATE   • Colon polyps 2010    cecum: hyperplastic polyp; descending colon: hyperplastic polyp; sigmoid polyp: tubular adenoma; rectal polyps x2; hyerplastic polyps   • CVA (cerebral vascular accident) (HCC)    • Diverticulosis    • Hypertension    • Stroke (HCC)      Past Surgical History:   Procedure Laterality Date   • COLONOSCOPY N/A 2010    Pancolonic diverticular disease as well as five small sessile colon polyps-Dr. Cain Rush   • COLONOSCOPY N/A 2018    Procedure: COLONOSCOPY to CECUM;  Surgeon: Camacho Collier MD;  Location: [facility] ENDOSCOPY;  Service: Gastroenterology   • CRANIOTOMY FOR SUBDURAL HEMATOMA Left 2022    Procedure: CRANIOTOMY FOR SUBDURAL HEMATOMA;  Surgeon: Shawn Mccullough MD;  Location: [facility] MAIN OR;  Service: Neurosurgery;  Laterality: Left;   • INGUINAL HERNIA REPAIR Left 2006    Open repair of left inguinal hernia with mesh-Dr. Kofi Bravo   • NASAL SINUS SURGERY Bilateral 2017    Septoplasty, submucous resection,  bilateral endoscopic image-guided anterior-posterior ethmoidectomy, bilateral endoscopic image-guided sphenoidotomy, bilateral image-guided endoscopic maxillary antrostomy (with removal of tissue, right axillary sinus), bilateral endosopic image-guided nasofrontal duct dissection, left endoscopic enrique bullosa resection-Dr. Petar Luque   • PROSTATECTOMY         SLP Recommendation and Plan  SLP Swallowing Diagnosis: oral dysphagia, suspected pharyngeal dysphagia (12/29/22 0900)  SLP Diet Recommendation: NPO, temporary alternate methods of nutrition/hydration, long term alternate methods of nutrition/hydration (12/29/22 0900)     SLP Rec. for Method of Medication Administration: meds via alternate route (12/29/22 0900)     Monitor for Signs of Aspiration: yes, notify SLP if any concerns (12/29/22 0900)  Recommended Diagnostics: reassess via clinical swallow evaluation (12/29/22 0900)  Swallow Criteria for Skilled Therapeutic Interventions Met: demonstrates skilled criteria (12/29/22 0900)  Anticipated Discharge Disposition (SLP): unknown (12/29/22 0900)  Rehab Potential/Prognosis, Swallowing: adequate, monitor progress closely (12/29/22 0900)  Therapy Frequency (Swallow): PRN (12/29/22 0900)  Predicted Duration Therapy Intervention (Days): until discharge (12/29/22 0900)                                        Plan of Care Reviewed With: patient  Outcome Evaluation: Re-eval of swallow completed. Continue to recommend NPO. Patient with adequate manipulation of ice chips, delayed swallow(s). Wet breathing and wet vocal quality s/p ice chips. Recommend alternate nutrition and meds alternate route. SLP to follow for re-eval as appropriate.      SWALLOW EVALUATION (last 72 hours)     SLP Adult Swallow Evaluation     Row Name 12/29/22 0900                   Rehab Evaluation    Document Type evaluation  -OC        Subjective Information no complaints  -OC        Patient Observations alert;cooperative;agree to therapy  -OC         Patient Effort good  -OC        Symptoms Noted During/After Treatment none  -OC           General Information    Patient Profile Reviewed yes  -OC        Current Method of Nutrition NPO;other (see comments)  pulled safiacorataye previous date  -OC        Precautions/Limitations, Vision WFL  -OC        Precautions/Limitations, Hearing WFL  -OC        Prior Level of Function-Communication WFL  -OC        Prior Level of Function-Swallowing unknown  -OC        Plans/Goals Discussed with patient;agreed upon  -OC        Barriers to Rehab medically complex  -OC        Patient's Goals for Discharge patient did not state  -OC           Pain Scale: Numbers Pre/Post-Treatment    Pretreatment Pain Rating 0/10 - no pain  -OC        Posttreatment Pain Rating 0/10 - no pain  -OC           Oral Motor Structure and Function    Dentition Assessment natural, present and adequate  -OC        Secretion Management other (see comments)  appears managed, ? wet/congested breathing at times  -OC        Volitional Swallow delayed;weak  -OC        Volitional Cough unable to elicit  -OC           Oral Musculature and Cranial Nerve Assessment    Oral Motor General Assessment generalized oral motor weakness;other (see comments)  mildly weak, slow to follow commands  -OC           Clinical Swallow Eval    Clinical Swallow Evaluation Summary Patient alert and upright, attempting to exit bed. RN reports patient pulled cortrak previous PM. Patient with congested breathing and weak vocal quality. Patient adequately accept ice chips X4. Slwo manipulation of ice chips inconsistently and required cues to swallow with 2/4 ice chips. Wet breathing/vocal quality s/p ice chips. Suspect pharyngeal residue, patient attempting to swallow multiple times with minimal/no change or improvement to vocal quality. Patient unable to clear vocal quality. No further trials administered secondary to inability to clear.  -OC           SLP Evaluation Clinical Impression     SLP Swallowing Diagnosis oral dysphagia;suspected pharyngeal dysphagia  -OC        Functional Impact risk of aspiration/pneumonia  -OC        Rehab Potential/Prognosis, Swallowing adequate, monitor progress closely  -OC        Swallow Criteria for Skilled Therapeutic Interventions Met demonstrates skilled criteria  -OC           Recommendations    Therapy Frequency (Swallow) PRN  -OC        Predicted Duration Therapy Intervention (Days) until discharge  -OC        SLP Diet Recommendation NPO;temporary alternate methods of nutrition/hydration;long term alternate methods of nutrition/hydration  -OC        Recommended Diagnostics reassess via clinical swallow evaluation  -OC        Oral Care Recommendations Oral Care BID/PRN  -OC        SLP Rec. for Method of Medication Administration meds via alternate route  -OC        Monitor for Signs of Aspiration yes;notify SLP if any concerns  -OC        Anticipated Discharge Disposition (SLP) unknown  -OC              User Key  (r) = Recorded By, (t) = Taken By, (c) = Cosigned By    Initials Name Effective Dates    Karina Kenyon MA, CCC-SLP 06/16/21 -                 EDUCATION  The patient has been educated in the following areas:   Dysphagia (Swallowing Impairment).              Time Calculation:    Time Calculation- SLP     Row Name 12/29/22 1308             Time Calculation- SLP    SLP Start Time 0900  -OC      SLP Received On 12/29/22  -OC         Untimed Charges    SLP Treatment ST Treatment Swallow Minutes  - 26995  -OC      [id number] Treatment Swallow Minutes 60  -OC         Total Minutes    Untimed Charges Total Minutes 60  -OC       Total Minutes 60  -OC            User Key  (r) = Recorded By, (t) = Taken By, (c) = Cosigned By    Initials Name Provider Type    Karina Kenyon MA, CCC-SLP Speech and Language Pathologist                Therapy Charges for Today     Code Description Service Date Service Provider Modifiers Qty    [id number] HC ST TREATMENT SWALLOW 4  12/29/2022 Karina Rodriguez MA,CCC-SLP  1               Karina Rodriguez MA,SPIKE-SLP  12/29/2022

## 2022-12-29 NOTE — PROGRESS NOTES
Name: Mingo Aquino ADMIT: 2022   : 1934  PCP: Adolfo Adame MD    MRN: [id number] LOS: 9 days   AGE/SEX: 88 y.o. male  ROOM: [facility]     Subjective   Subjective   More confused overnight but stable now and out of restraints. Pulled his Cortrak out    Disinterested in conversation.  Does not participate much unless directly asked.  He did deny any hallucinations today    Review of Systems     Objective   Objective   Vital Signs  Temp:  [97.7 °F (36.5 °C)-98.6 °F (37 °C)] 97.7 °F (36.5 °C)  Heart Rate:  [] 101  Resp:  [18-28] 18  BP: (109-158)/(64-99) 149/92  SpO2:  [95 %-98 %] 97 %  on   ;   Device (Oxygen Therapy): room air  Body mass index is 22.99 kg/m².  Physical Exam  Vitals reviewed.   Constitutional:       General: He is not in acute distress.     Appearance: He is ill-appearing.   Eyes:      General: No scleral icterus.  Cardiovascular:      Rate and Rhythm: Tachycardia present. Rhythm irregular.   Pulmonary:      Effort: Pulmonary effort is normal. No respiratory distress.      Breath sounds: No wheezing.   Abdominal:      General: Bowel sounds are normal. There is no distension.      Palpations: Abdomen is soft.      Tenderness: There is no abdominal tenderness.   Musculoskeletal:      Right lower leg: No edema.      Left lower leg: No edema.   Skin:     General: Skin is warm and dry.      Findings: No rash.      Comments: Craniotomy incision looks intact   Neurological:      Mental Status: He is alert. He is disoriented.   Psychiatric:      Comments: Anxious         Results Review     I reviewed the patient's new clinical results.  Results from last 7 days   Lab Units 22  0646 22  0621 2237 22  0341   WBC 10*3/mm3 16.76* 14.03* 12.06* 12.24*   HEMOGLOBIN g/dL 14.1 13.1 11.6* 12.1*   PLATELETS 10*3/mm3 456* 389 272 261     Results from last 7 days   Lab Units 22  0646 22  0621 2237 22  0341   SODIUM mmol/L 145 139 141  143   POTASSIUM mmol/L 4.0 3.8 3.7 3.9   CHLORIDE mmol/L 112* 109* 111* 115*   CO2 mmol/L 24.0 23.0 23.4 19.0*   BUN mg/dL 30* 27* 26* 23   CREATININE mg/dL 0.76 0.66* 0.71* 0.57*   GLUCOSE mg/dL 134* 150* 135* 136*   EGFR mL/min/1.73 86.5 90.2 88.2 94.3       Results from last 7 days   Lab Units 12/29/22  0646 12/27/22  1513 12/27/22  0621 12/26/22  0637 12/25/22  0341 12/24/22  1535   CALCIUM mg/dL 9.2  --  9.4 8.9 8.8  --    MAGNESIUM mg/dL  --  2.2  --   --   --  2.3     Results from last 7 days   Lab Units 12/29/22  0646   PROCALCITONIN ng/mL 0.05     Glucose   Date/Time Value Ref Range Status   12/28/2022 1603 112 70 - 130 mg/dL Final     Comment:     Meter: [id number] : [id number] Pierce Mitchell NA   12/28/2022 1231 122 70 - 130 mg/dL Final     Comment:     Meter: [id number] : [id number] Hatchettmj Fontaine NA   12/28/2022 0650 152 (H) 70 - 130 mg/dL Final     Comment:     Meter: [id number] : [id number] Taylor Maria Dolores [facility]   12/28/2022 0012 148 (H) 70 - 130 mg/dL Final     Comment:     Meter: [id number] : [id number] Brandon Maria Dolores [facility]   12/27/2022 1707 116 70 - 130 mg/dL Final     Comment:     Meter: [id number] : [id number] Hatchettmj Fontaine NA   12/27/2022 1130 129 70 - 130 mg/dL Final     Comment:     Meter: [id number] : [id number] Hatchettmj Stinson NA   12/27/2022 0623 155 (H) 70 - 130 mg/dL Final     Comment:     Meter: [id number] : [id number] Elizabeth SANTOS       CT Head Without Contrast    Result Date: 12/28/2022  A heterogeneous epidural fluid collection underlying the craniotomy flap has decreased in size. High attenuation blood products overlying the left parietal and occipital lobes are again appreciated decreased in attenuation. The parietal component has decreased in size. The parietooccipital and occipital component appear similar as does a small low-attenuation subdural related to the undersurface of the tentorium cerebelli on the left posteriorly. The high attenuation  blood products which were present centrally overlying the right frontal lobe have resolved. There is an enlarging low-attenuation subdural fluid collection overlying the right frontal lobe superior laterally consistent with a hygroma. This currently measures approximately 7 mm in thickness as measured perpendicular to the inner table on the coronal reconstructions. It measured 4-5 mm in thickness on the CT examination of 12/21/2022. Continued surveillance is recommended. There is no evidence of acute infarction or of acute intracranial hemorrhage. Edema, soft tissue prominence and fluid overlying the craniotomy flap on the left are more prominent as compared to 12/21/2022 currently measuring approximately 17 mm in thickness, previously 10 mm.   Radiation dose reduction techniques were utilized, including automated exposure control and exposure modulation based on body size.  This report was finalized on 12/28/2022 4:26 PM by Dr. Sylvester Maynard M.D.      XR Chest 1 View    Result Date: 12/29/2022  Bilateral hilar prominence with similar appearance to exam 9 days ago. No evidence for superimposed airspace disease or pulmonary edema or pleural effusion.  This report was finalized on 12/29/2022 9:24 AM by Dr. Saud Delatorre M.D.      Scheduled Medications  amLODIPine, 10 mg, Oral, Daily  digoxin, 250 mcg, Intravenous, Daily  ipratropium-albuterol, 3 mL, Nebulization, 4x Daily - RT  levETIRAcetam, 500 mg, Intravenous, Q12H  lisinopril, 10 mg, Oral, Q24H  melatonin, 5 mg, Oral, Nightly  metoprolol tartrate, 25 mg, Oral, Q12H  sodium chloride, 10 mL, Intravenous, Q12H    Infusions  dilTIAZem HCl-Sodium Chloride, 5-15 mg/hr, Last Rate: 15 mg/hr (12/29/22 0944)    Diet  NPO Diet NPO Type: Strict NPO       Assessment/Plan     Active Hospital Problems    Diagnosis  POA   • **Subdural hematoma [S06.5XAA]  Yes   • Atrial flutter (HCC) [I48.92]  No   • Encephalopathy [G93.40]  Unknown   • Hyperglycemia [R73.9]  Unknown   •  Benign essential hypertension [I10]  Yes      Resolved Hospital Problems   No resolved problems to display.       88 y.o. male admitted with Subdural hematoma status post craniotomy 12/20    Confusion/encephalopathy-suspect metabolic cause or potentially just delirium related to brain surgery and ICU/hospitalization  -Hallucinations apparently resolved. ?if related to Seroquel. Still some agitation/sundowning, likely to need something at night. Will try zyprexa prn only.    - WBC up but no overt infectious source and afebrile. CXR, UA, procal all normal.  Will check Doppler  - Repeat CT 12/28 with decrease in the size of the bleeding areas but some increase in hygroma    A flutter with uncontrolled rate.  Improved some  - Diltiazem drip per cardiology.    -Cannot start AC until cleared by neurosurgery    Dysphagia related to above.  Failed repeat speech study.  Discussed with POA by phone and with his other close friend at bedside, all in agreement to proceed with PEG tube.  We will consult surgery for assistance.  Will not replace core track at this time unless there is going to be a delay in getting PEG placed      SCDs for DVT prophylaxis.  Disposition timing uncertain at this time.  Will certainly need rehab      Edgar Ye MD  [facility]  12/29/22  14:53 EST

## 2022-12-29 NOTE — SIGNIFICANT NOTE
12/29/22 1602   OTHER   Discipline physical therapist   Rehab Time/Intention   Session Not Performed other (see comments)  (spoke w RN, will hold PT again today, still have some HR issues, will follow up tomorrow as appropriate.)   Recommendation   PT - Next Appointment 12/30/22

## 2022-12-29 NOTE — PROGRESS NOTES
"Nutrition Services    Patient Name:  Mingo Aquino  YOB: 1934  MRN: [id number]  Admit Date:  12/20/2022    PROGRESS NOTE - CLINICAL NUTRITION    Assessment Date:  12/29/22    Encounter Information         Reason for Encounter Follow-up.     Current Issues Patient pulled Cortrak last night, he has been pulling at all his lines. Per family, plans for a PEG. RD gave brief education on what a PEG is and how it works. They are concerned the patient may pull at that. They report he was very healthy and active before this happen and are hopeful that he will be able to swallow again. Will continue to follow     Current Nutrition Orders & Evaluation of Intake       Oral Nutrition     Current PO Diet NPO Diet NPO Type: Strict NPO   Supplement n/a   PO Evaluation     % PO Intake     # of Days Evaluated     Factors Affecting Intake  chewing difficulty, swallow impairment   --  Anthropometrics          Height    Weight Height: 183 cm (72.05\")  Weight: 77 kg (169 lb 12.1 oz) (12/28/22 1158)    BMI kg/m2 Body mass index is 22.99 kg/m².    Weight trend Stable.  Documented weights    12/20/22 1147 12/20/22 1455 12/20/22 2238 12/21/22 0400   Weight: 80.7 kg (178 lb) 80.7 kg (178 lb) 83.8 kg (184 lb 11.9 oz) 83.8 kg (184 lb 11.9 oz)    12/22/22 0431 12/28/22 0607 12/28/22 1158   Weight: 87.5 kg (192 lb 14.4 oz) 77.2 kg (170 lb 3.1 oz) 77 kg (169 lb 12.1 oz)        Nutrient needs  1982kcal, 88g protein, 2000mL fluid      Labs        Pertinent Labs Reviewed, listed below     Results from last 7 days   Lab Units 12/29/22  0646 12/27/22  0621 12/26/22  0637   SODIUM mmol/L 145 139 141   POTASSIUM mmol/L 4.0 3.8 3.7   CHLORIDE mmol/L 112* 109* 111*   CO2 mmol/L 24.0 23.0 23.4   BUN mg/dL 30* 27* 26*   CREATININE mg/dL 0.76 0.66* 0.71*   CALCIUM mg/dL 9.2 9.4 8.9   GLUCOSE mg/dL 134* 150* 135*     Results from last 7 days   Lab Units 12/29/22  0646 12/27/22  1513 12/25/22  0341 12/24/22  1535   MAGNESIUM mg/dL  --  2.2  " --  2.3   HEMOGLOBIN g/dL 14.1  --    < >  --    HEMATOCRIT % 41.0  --    < >  --    WBC 10*3/mm3 16.76*  --    < >  --     < > = values in this interval not displayed.     Results from last 7 days   Lab Units 12/29/22  0646 12/27/22  0621 12/26/22  0637 12/25/22  0341 12/24/22  0835   PLATELETS 10*3/mm3 456* 389 272 261 226     COVID19   Date Value Ref Range Status   02/24/2021 Not Detected Not Detected - Ref. Range Final     Lab Results   Component Value Date    HGBA1C 5.80 (H) 12/21/2022          Medications            Scheduled Medications amLODIPine, 10 mg, Oral, Daily  digoxin, 250 mcg, Intravenous, Daily  ipratropium-albuterol, 3 mL, Nebulization, 4x Daily - RT  levETIRAcetam, 500 mg, Intravenous, Q12H  lisinopril, 10 mg, Oral, Q24H  melatonin, 5 mg, Oral, Nightly  metoprolol tartrate, 25 mg, Oral, Q12H  sodium chloride, 10 mL, Intravenous, Q12H        Infusions dilTIAZem HCl-Sodium Chloride, 5-15 mg/hr, Last Rate: 15 mg/hr (12/29/22 0944)        PRN Medications •  acetaminophen  •  docusate sodium  •  hydrALAZINE  •  labetalol  •  metoprolol tartrate  •  Morphine **AND** naloxone  •  OLANZapine  •  ondansetron **OR** ondansetron  •  senna-docusate sodium  •  sodium chloride  •  sodium chloride  •  sodium chloride     Physical Findings          Physical Appearance alert, aphasic, disoriented   Oral/Mouth Cavity WNL   Edema  no edema   Gastrointestinal last bowel movement:12/26   Skin  surgical incision   Tubes/Drains none   NFPE Not applicable at this time   --  NUTRITION INTERVENTION / PLAN OF CARE  Intervention Goal         Intervention Goal(s) Nutrition support treatment, Reduce/improve symptoms, Meet estimated needs, Initiate TF/PN, Tolerate TF/PN at goal, Transition TF to PO and Maintain weight     Nutrition Intervention         RD Action Interview for preferences, Follow Tx Progress and Care plan reviewed     Nutrition Prescription         Diet Prescription     Supplement Prescription n/a   EN/PN  Prescription    New Prescription Ordered? No changes at this time   --  Monitor/Evaluation        Monitor Per protocol   Discharge Needs Pending clinical course   Education Will instruct as appropriate   --        Education topic: Enteral nutrition     Resources given:  Printed materials     Advised regarding: Other: different feeding regiments      Learner:  Other:POA and POA's wife      Readiness to learn: Barriers to learning:aphasia, disorientation      RD contact info provided: Yes     Outpatient referral:        RD to follow up per protocol.    Electronically signed by:  Nicolasa Solorzano RD  12/29/22 15:51 EST

## 2022-12-29 NOTE — CONSULTS
[facility]   Consult Note    Patient Name: Mingo Aquino  : 1934  MRN: [id number]  Primary Care Physician:  Adolfo Adame MD  Referring Physician: Dianne Kramer MD  Date of admission: 2022    Inpatient General Surgery Consult  Consult performed by: Bhupinder Theodore Jr., MD  Consult ordered by: Edgar Ye MD        Subjective   Subjective     Reason for Consult/ Chief Complaint: Dysphagia    History of Present Illness  Mingo Aquino is an 88 y.o. male who was admitted on 2022 after being found down by his family.  Evaluation showed a subdural hematoma and he was taken to the operating room where he underwent a craniotomy with evacuation of the subdural hematoma.  He has been recovering but it is complicated by altered mental status preventing him from providing any history.  He has been noted to have dysphagia and is not able to eat.  He had a feeding tube in place but he removed it.  Request was made for surgical consultation for PEG tube placement.    Review of Systems   Unable to perform ROS: Mental status change        Personal History     Past Medical History:   Diagnosis Date   • Arrhythmia    • Cancer (HCC)     PROSTATE   • Colon polyps 2010    cecum: hyperplastic polyp; descending colon: hyperplastic polyp; sigmoid polyp: tubular adenoma; rectal polyps x2; hyerplastic polyps   • CVA (cerebral vascular accident) (HCC)    • Diverticulosis    • Hypertension    • Stroke (HCC)        Past Surgical History:   Procedure Laterality Date   • COLONOSCOPY N/A 2010    Pancolonic diverticular disease as well as five small sessile colon polyps-Dr. Cain Rush   • COLONOSCOPY N/A 2018    Procedure: COLONOSCOPY to CECUM;  Surgeon: Camacho Collier MD;  Location: [facility] ENDOSCOPY;  Service: Gastroenterology   • CRANIOTOMY FOR SUBDURAL HEMATOMA Left 2022    Procedure: CRANIOTOMY FOR SUBDURAL HEMATOMA;  Surgeon: Shawn Mccullough MD;  Location:   [facility];  Service: Neurosurgery;  Laterality: Left;   • INGUINAL HERNIA REPAIR Left 07/24/2006    Open repair of left inguinal hernia with mesh-Dr. Kofi Bravo   • NASAL SINUS SURGERY Bilateral 12/11/2017    Septoplasty, submucous resection, bilateral endoscopic image-guided anterior-posterior ethmoidectomy, bilateral endoscopic image-guided sphenoidotomy, bilateral image-guided endoscopic maxillary antrostomy (with removal of tissue, right axillary sinus), bilateral endosopic image-guided nasofrontal duct dissection, left endoscopic enrique bullosa resection-Dr. Petar Luque   • PROSTATECTOMY         Family History: family history includes Colon cancer in his mother; Kidney cancer in his sister. Otherwise pertinent FHx was reviewed and not pertinent to current issue.    Social History:  reports that he has never smoked. He has never used smokeless tobacco. He reports current alcohol use. He reports that he does not use drugs.    Home Medications:   aspirin, atorvastatin, azelastine, cetirizine, clopidogrel, digoxin, metoprolol tartrate, and multivitamin    Allergies:  No Known Allergies    Objective    Objective     Vitals:  Temp:  [97.7 °F (36.5 °C)-98.6 °F (37 °C)] 97.9 °F (36.6 °C)  Heart Rate:  [] 93  Resp:  [18-20] 18  BP: (109-159)/(71-99) 159/82    Physical Exam  Constitutional:       General: He is awake.   Abdominal:      Palpations: Abdomen is soft.      Tenderness: There is no abdominal tenderness.      Comments: There are no upper abdominal incisions.   Psychiatric:         Behavior: Behavior is cooperative.         Result Review    Result Review:  I have personally reviewed the results from the time of this admission to 12/29/2022 17:59 EST and agree with these findings:  [x]  Laboratory list / accordion  []  Microbiology  [x]  Radiology  []  EKG/Telemetry   []  Cardiology/Vascular   []  Pathology  [x]  Old records  []  Other:      Assessment & Plan   Assessment / Plan     Brief Patient  Summary with assessment and plan:  Mingo Aquino is a 88 y.o. male who was found down and brought to the emergency room where he was diagnosed with a subdural hematoma.  He underwent a craniotomy with evacuation of the hematoma but has had mental status changes and dysphagia.    1.  Dysphagia: We will plan to proceed with a PEG.          Bhupinder Theodore Jr., MD

## 2022-12-29 NOTE — PLAN OF CARE
Goal Outcome Evaluation:  Plan of Care Reviewed With: patient           Outcome Evaluation: Re-eval of swallow completed. Continue to recommend NPO. Patient with adequate manipulation of ice chips, delayed swallow(s). Wet breathing and wet vocal quality s/p ice chips. Recommend alternate nutrition and meds alternate route. SLP to follow for re-eval as appropriate.

## 2022-12-29 NOTE — PROGRESS NOTES
"    Patient Name: Mingo Aquino  :1934  88 y.o.      Patient Care Team:  Adolfo Adame MD as PCP - General (Family Medicine)    Chief Complaint: follow up PVCs, atrial fibrillation    Interval History: He is resting in bed. He is alert. He does not have any complaints. HR is in the 90s. Remains in AF.        Objective   Vital Signs  Temp:  [97.7 °F (36.5 °C)-98.6 °F (37 °C)] 97.7 °F (36.5 °C)  Heart Rate:  [] 101  Resp:  [18-28] 18  BP: (109-158)/(64-99) 149/92    Intake/Output Summary (Last 24 hours) at 2022 1344  Last data filed at 2022 2200  Gross per 24 hour   Intake --   Output 125 ml   Net -125 ml     Flowsheet Rows    Flowsheet Row First Filed Value   Admission Height 182.9 cm (72\") Documented at 2022 1147   Admission Weight 80.7 kg (178 lb) Documented at 2022 1147          Physical Exam:   General Appearance:    Alert, cooperative, in no acute distress   Lungs:     Clear to auscultation.  Normal respiratory effort and rate.      Heart:    Regular rhythm and normal rate, normal S1 and S2, no murmurs, gallops or rubs.     Chest Wall:    No abnormalities observed   Abdomen:     Soft, nontender, positive bowel sounds.     Extremities:   no cyanosis, clubbing or edema.  No marked joint deformities.  Adequate musculoskeletal strength.       Results Review:    Results from last 7 days   Lab Units 22  0646   SODIUM mmol/L 145   POTASSIUM mmol/L 4.0   CHLORIDE mmol/L 112*   CO2 mmol/L 24.0   BUN mg/dL 30*   CREATININE mg/dL 0.76   GLUCOSE mg/dL 134*   CALCIUM mg/dL 9.2     Results from last 7 days   Lab Units 22  1513   TROPONIN T ng/mL <0.010     Results from last 7 days   Lab Units 22  0646   WBC 10*3/mm3 16.76*   HEMOGLOBIN g/dL 14.1   HEMATOCRIT % 41.0   PLATELETS 10*3/mm3 456*         Results from last 7 days   Lab Units 22  1513   MAGNESIUM mg/dL 2.2                   Medication Review:   amLODIPine, 10 mg, Oral, Daily  digoxin, 250 mcg, " Intravenous, Daily  ipratropium-albuterol, 3 mL, Nebulization, 4x Daily - RT  levETIRAcetam, 500 mg, Intravenous, Q12H  lisinopril, 10 mg, Oral, Q24H  melatonin, 5 mg, Oral, Nightly  metoprolol tartrate, 25 mg, Oral, Q12H  sodium chloride, 10 mL, Intravenous, Q12H         dilTIAZem HCl-Sodium Chloride, 5-15 mg/hr, Last Rate: 15 mg/hr (12/29/22 0926)        Assessment & Plan      1.subdural hematoma - status post craniotomy and hematoma evacuation on 12/20/2022. Reported new head ache this morning. Mentation is waxing and waning.     2. Ventricular ectopy - he wore a holter in February that demonstrated occasional couplets and and triplets. Will check TSH and magnesium.  He has some diffuse ST T changes. Will check an echo.      3. Hypertension     4. History of stroke     5. History of paroxysmal SVT, follows with Dr. Charles at [place].     6. Atrial fibrillation/flutter - going in and out. On diltiazem drip.    No route for pills currently. PEG being considered. Continue dilt drip and IV digoxin daily.    KAYCE Faulkner  [facility]  12/29/22  13:44 EST.

## 2022-12-30 NOTE — PLAN OF CARE
Goal Outcome Evaluation:      VSS. Cardizem gtt at 15mg/hr (HR ). PRN hydralazine administered for HTN. Patient oriented to self. Patient discontinued IV access and was increasingly becoming more agitated. PRN Zyprexa administered. Patient slept afterwards. Continue to monitor.              Problem: Adult Inpatient Plan of Care  Goal: Plan of Care Review  Outcome: Ongoing, Progressing  Goal: Patient-Specific Goal (Individualized)  Outcome: Ongoing, Progressing     Problem: Fall Injury Risk  Goal: Absence of Fall and Fall-Related Injury  Outcome: Ongoing, Progressing  Intervention: Identify and Manage Contributors  Recent Flowsheet Documentation  Taken 12/29/2022 2000 by Kenyatta Goetz RN  Medication Review/Management: medications reviewed  Intervention: Promote Injury-Free Environment  Recent Flowsheet Documentation  Taken 12/30/2022 0400 by Kenyatta Goetz RN  Safety Promotion/Fall Prevention: safety round/check completed  Taken 12/30/2022 0200 by Kenyatta Goetz RN  Safety Promotion/Fall Prevention: safety round/check completed  Taken 12/30/2022 0000 by Kenyatta Goetz RN  Safety Promotion/Fall Prevention: safety round/check completed  Taken 12/29/2022 2200 by Kenyatta Goetz RN  Safety Promotion/Fall Prevention: safety round/check completed  Taken 12/29/2022 2000 by Kenyatta Goetz RN  Safety Promotion/Fall Prevention:   assistive device/personal items within reach   fall prevention program maintained   nonskid shoes/slippers when out of bed   safety round/check completed     Problem: Hypertension Comorbidity  Goal: Blood Pressure in Desired Range  Outcome: Ongoing, Progressing  Intervention: Maintain Blood Pressure Management  Recent Flowsheet Documentation  Taken 12/29/2022 2000 by Kenyatta Goetz RN  Medication Review/Management: medications reviewed     Problem: Skin Injury Risk Increased  Goal: Skin Health and Integrity  Outcome: Ongoing, Progressing  Intervention: Optimize Skin  Protection  Recent Flowsheet Documentation  Taken 12/30/2022 0400 by Kenyatta Goetz RN  Head of Bed (HOB) Positioning: HOB at 15 degrees  Taken 12/30/2022 0200 by Kenyatta Goetz RN  Head of Bed (HOB) Positioning: HOB at 15 degrees  Taken 12/30/2022 0000 by Kenyatta Goetz RN  Head of Bed (HOB) Positioning: HOB at 15 degrees  Taken 12/29/2022 2200 by Kenyatta Goetz RN  Head of Bed (HOB) Positioning: HOB at 15 degrees  Taken 12/29/2022 2000 by Kenyatta Goetz RN  Pressure Reduction Techniques:   weight shift assistance provided   frequent weight shift encouraged  Head of Bed (HOB) Positioning: HOB at 20-30 degrees  Pressure Reduction Devices: alternating pressure pump (ADD)     Problem: Restraint, Nonbehavioral (Nonviolent)  Goal: Absence of Harm or Injury  Outcome: Ongoing, Progressing  Intervention: Implement Least Restrictive Safety Strategies  Recent Flowsheet Documentation  Taken 12/30/2022 0400 by Kenyatta Goetz RN  Medical Device Protection: tubing secured  Less Restrictive Alternative:   bed alarm in use   calming techniques promoted   counseling provided   emotional support provided   safety enhancements provided   positive reinforcement provided   sensory stimulation limited  De-Escalation Techniques:   quiet time facilitated   reoriented   stimulation decreased  Diversional Activities: (decrease stimulation) other (see comments)  Taken 12/30/2022 0200 by Kenyatta Goetz RN  Medical Device Protection: tubing secured  Less Restrictive Alternative:   bed alarm in use   calming techniques promoted   counseling provided   emotional support provided   safety enhancements provided   positive reinforcement provided   sensory stimulation limited  De-Escalation Techniques:   quiet time facilitated   reoriented   stimulation decreased  Diversional Activities: (decrease stimulation) other (see comments)  Taken 12/30/2022 0000 by Kenyatta Goetz RN  Medical Device Protection: tubing secured  Less Restrictive  Alternative:   bed alarm in use   calming techniques promoted   counseling provided   emotional support provided   safety enhancements provided   positive reinforcement provided   sensory stimulation limited  De-Escalation Techniques:   quiet time facilitated   reoriented   stimulation decreased  Diversional Activities: (decrease stimulation) other (see comments)  Taken 12/29/2022 2200 by Kenyatta Goetz RN  Medical Device Protection:   torso covered   tubing secured  Less Restrictive Alternative:   bed alarm in use   calming techniques promoted   counseling provided   emotional support provided   safety enhancements provided   positive reinforcement provided   sensory stimulation limited  De-Escalation Techniques:   quiet time facilitated   reoriented   stimulation decreased  Diversional Activities: (decrease stimulation) other (see comments)  Taken 12/29/2022 2000 by Kenyatta Goetz RN  Medical Device Protection:   IV pole/bag removed from visual field   torso covered   tubing secured  Less Restrictive Alternative:   bed alarm in use   calming techniques promoted   counseling provided   emotional support provided   safety enhancements provided   positive reinforcement provided   sensory stimulation limited  De-Escalation Techniques:   quiet time facilitated   reoriented   stimulation decreased   verbally redirected  Diversional Activities: television  Intervention: Protect Skin and Joint Integrity  Recent Flowsheet Documentation  Taken 12/30/2022 0400 by Kenyatta Goetz RN  Body Position: weight shifting  Taken 12/30/2022 0200 by Kenyatta Goetz RN  Body Position:   weight shifting   position changed independently  Taken 12/30/2022 0000 by Kenyatta Goetz RN  Body Position: weight shifting  Taken 12/29/2022 2200 by Kenyatta Goetz RN  Body Position: position changed independently  Taken 12/29/2022 2000 by Kenyatta Goetz RN  Body Position: weight shifting

## 2022-12-30 NOTE — SIGNIFICANT NOTE
12/30/22 1017   OTHER   Discipline physical therapist   Rehab Time/Intention   Session Not Performed patient unavailable for treatment  (pt off floor to Endo, will follow up tomorrow.)   Recommendation   PT - Next Appointment 12/31/22

## 2022-12-30 NOTE — ANESTHESIA PREPROCEDURE EVALUATION
Anesthesia Evaluation     Patient summary reviewed and Nursing notes reviewed   no history of anesthetic complications:  NPO Solid Status: > 8 hours  NPO Liquid Status: > 8 hours           Airway   Mallampati: II  TM distance: >3 FB  Neck ROM: full  no difficulty expected and No difficulty expected  Dental - normal exam     Pulmonary - negative pulmonary ROS and normal exam    breath sounds clear to auscultation  (-) rhonchi, decreased breath sounds, wheezes, rales, stridor  Cardiovascular - normal exam    NYHA Classification: I  ECG reviewed  PT is on anticoagulation therapy  Patient on routine beta blocker  Rhythm: regular  Rate: normal    (+) hypertension, dysrhythmias PAC, Tachycardia,   (-) murmur, weak pulses, friction rub, systolic click, carotid bruits, JVD, peripheral edema      Neuro/Psych  (+) CVA residual symptoms, psychiatric history,    GI/Hepatic/Renal/Endo    (+)   hepatitis B, liver disease,     Musculoskeletal (-) negative ROS    Abdominal  - normal exam    Abdomen: soft.   Substance History - negative use     OB/GYN negative ob/gyn ROS         Other      history of cancer remission                      Anesthesia Plan    ASA 4     MAC     (    )  intravenous induction     Anesthetic plan, risks, benefits, and alternatives have been provided, discussed and informed consent has been obtained with: patient.        CODE STATUS:

## 2022-12-30 NOTE — CASE MANAGEMENT/SOCIAL WORK
Continued Stay Note  [facility]     Patient Name: Mingo Aquino  MRN: [id number]  Today's Date: 12/30/2022    Admit Date: 12/20/2022    Plan: SNF- referrals pending   Discharge Plan     Row Name 12/30/22 1026       Plan    Plan SNF- referrals pending    Patient/Family in Agreement with Plan yes    Plan Comments CCP noted patient having PEG placed today. Not medically stable for discharge. Restraints remain in place. SNF referrals remain pending. Patient will need pre cert. Packet in CCP office. CCP to follow up. Margaret HUITRON RN CCP               Discharge Codes    No documentation.               Expected Discharge Date and Time     Expected Discharge Date Expected Discharge Time    Martin 3, 2023             Margaret Hoyos RN

## 2022-12-30 NOTE — PROGRESS NOTES
"    Patient Name: Mingo Aquino  :1934  88 y.o.      Patient Care Team:  Adolfo Adame MD as PCP - General (Family Medicine)    Chief Complaint: follow up PVCs, atrial fibrillation    Interval History: status post PEG placement this morning. HR pretty well controlled on 15 mg/hr of IV diltiazem.        Objective   Vital Signs  Temp:  [97.2 °F (36.2 °C)-98.1 °F (36.7 °C)] 98.1 °F (36.7 °C)  Heart Rate:  [] 91  Resp:  [15-22] 18  BP: (113-180)/(62-98) 126/80    Intake/Output Summary (Last 24 hours) at 2022 1520  Last data filed at 2022 1125  Gross per 24 hour   Intake 500 ml   Output --   Net 500 ml     Flowsheet Rows    Flowsheet Row First Filed Value   Admission Height 182.9 cm (72\") Documented at 2022 1147   Admission Weight 80.7 kg (178 lb) Documented at 2022 1147          Physical Exam:   General Appearance:    Alert, cooperative, in no acute distress   Lungs:     Clear to auscultation.  Normal respiratory effort and rate.      Heart:    Regular rhythm and normal rate, normal S1 and S2, no murmurs, gallops or rubs.     Chest Wall:    No abnormalities observed   Abdomen:     Soft, nontender, positive bowel sounds.     Extremities:   no cyanosis, clubbing or edema.  No marked joint deformities.  Adequate musculoskeletal strength.       Results Review:    Results from last 7 days   Lab Units 22  0639   SODIUM mmol/L 146*   POTASSIUM mmol/L 4.1   CHLORIDE mmol/L 112*   CO2 mmol/L 22.3   BUN mg/dL 34*   CREATININE mg/dL 0.79   GLUCOSE mg/dL 142*   CALCIUM mg/dL 9.7     Results from last 7 days   Lab Units 22  1513   TROPONIN T ng/mL <0.010     Results from last 7 days   Lab Units 22  0639   WBC 10*3/mm3 16.62*   HEMOGLOBIN g/dL 14.9   HEMATOCRIT % 42.8   PLATELETS 10*3/mm3 497*         Results from last 7 days   Lab Units 22  1513   MAGNESIUM mg/dL 2.2                   Medication Review:   amLODIPine, 10 mg, Oral, Daily  digoxin, 250 mcg, " Intravenous, Daily  ipratropium-albuterol, 3 mL, Nebulization, 4x Daily - RT  levETIRAcetam, 500 mg, Intravenous, Q12H  lisinopril, 10 mg, Oral, Q24H  melatonin, 5 mg, Oral, Nightly  metoprolol tartrate, 25 mg, Oral, Q12H  sodium chloride, 10 mL, Intravenous, Q12H         dextrose, 75 mL/hr, Last Rate: 75 mL/hr (12/30/22 0835)  dilTIAZem HCl-Sodium Chloride, 5-15 mg/hr, Last Rate: 15 mg/hr (12/30/22 1019)  sodium chloride, 30 mL/hr        Assessment & Plan      1.subdural hematoma - status post craniotomy and hematoma evacuation on 12/20/2022. Reported new head ache this morning. Mentation is waxing and waning.     2. Ventricular ectopy - he wore a holter in February that demonstrated occasional couplets and and triplets. Echocardiogram showed hyperdynamic LV function. Mild to moderate aortic valve calcification. Mild MR.      3. Hypertension     4. History of stroke     5. History of paroxysmal SVT, follows with Dr. Charles at [place].     6. Atrial fibrillation/flutter - going in and out. On diltiazem drip.    PEG placed. If tolerating tube feeding tomorrow, will plan to transition to pills that can be crushed and given through tube. Not a candidate for anticoagulation at this time.    KAYCE Faulkner  [facility]  12/30/22  15:20 EST.

## 2022-12-30 NOTE — OP NOTE
Surgeon:     Bhupinder Theodore Jr., M.D.    Preoperative Diagnosis:     Dysphagia    Postoperative Diagnosis:     Dysphagia    Procedure Performed:     PEG    Indications:     The patient is a pleasant 88-year-old male who was admitted with mental status changes and a subdural hematoma that required a craniotomy with evacuation.  He has dysphagia and is unable to eat.  He presents for PEG.  The patient's friend understands the indications, alternatives, risks, and benefits of the procedure and wishes to proceed.    Procedure:     The patient was identified, taken to the endoscopy suite, and place in the left side down decubitus procedure.  The patient underwent a MAC anesthesia and was appropriately monitored through the case by the anesthesia personnel.  The gastroscope was introduced into the oropharynx and advanced are carefully down the esophagus and into the stomach.  The stomach was insufflated and inspected.  The stomach was normal except for superficial erosions.  An area was selected for PEG tube placement based on light reflex and impulse.  The area corresponding to this on the abdominal wall was prepped and draped in the standard surgical fashion and then infiltrated with 1% lidocaine without epinephrine.  An incision was made with the scalpel and carried through the skin into the subcutaneous tissues tissue.  An Angiocath was placed to the incision and advanced into the stomach with direct visualization intragastrically using the gastroscope.  A guidewire was advanced through the Angiocath, grasped with the gastroscope, and brought out through the mouth.  The PEG tube was then placed over the guidewire in the standard fashion and brought out through the abdominal wall.  The gastroscope was returned to the stomach with a PEG tube was in good position with no bleeding.  The PEG tube was secured with the apparatus provided in the kit.  A sterile dressing was applied.  The patient tolerated the procedure  well.    Findings:    PEG in good position.    Recommendations:     1.  May start tube feeds at 1800 today.    Bhupinder Theodore Jr., M.D.

## 2022-12-30 NOTE — PROGRESS NOTES
Name: Mingo Aquino ADMIT: 2022   : 1934  PCP: Adolfo Adame MD    MRN: [id number] LOS: 10 days   AGE/SEX: 88 y.o. male  ROOM: [facility]     Subjective   Subjective   Waxing and waning confusion.  Out of restraints when I saw this morning.  He was sleeping soundly but woke easily    Review of Systems     Objective   Objective   Vital Signs  Temp:  [97.2 °F (36.2 °C)-98.2 °F (36.8 °C)] 98.2 °F (36.8 °C)  Heart Rate:  [] 86  Resp:  [15-22] 20  BP: (113-180)/(62-98) 140/87  SpO2:  [94 %-98 %] 97 %  on  Flow (L/min):  [8] 8;   Device (Oxygen Therapy): room air  Body mass index is 22.92 kg/m².  Physical Exam  Vitals reviewed.   Constitutional:       General: He is not in acute distress.     Appearance: He is ill-appearing.      Comments: Drowsy but easily aroused   Eyes:      General: No scleral icterus.  Cardiovascular:      Rate and Rhythm: Normal rate. Rhythm irregular.   Pulmonary:      Effort: Pulmonary effort is normal. No respiratory distress.      Breath sounds: No wheezing.   Abdominal:      General: Bowel sounds are normal. There is no distension.      Palpations: Abdomen is soft.      Tenderness: There is no abdominal tenderness.   Musculoskeletal:      Right lower leg: No edema.      Left lower leg: No edema.   Skin:     General: Skin is warm and dry.      Findings: No rash.      Comments: Craniotomy incision looks intact   Neurological:      Mental Status: He is disoriented.   Psychiatric:      Comments: Anxious         Results Review     I reviewed the patient's new clinical results.  Results from last 7 days   Lab Units 22  0639 22  0646 22  0637   WBC 10*3/mm3 16.62* 16.76* 14.03* 12.06*   HEMOGLOBIN g/dL 14.9 14.1 13.1 11.6*   PLATELETS 10*3/mm3 497* 456* 389 272     Results from last 7 days   Lab Units 22  0639 22  0646 22  0621 22  0637   SODIUM mmol/L 146* 145 139 141   POTASSIUM mmol/L 4.1 4.0 3.8 3.7   CHLORIDE  mmol/L 112* 112* 109* 111*   CO2 mmol/L 22.3 24.0 23.0 23.4   BUN mg/dL 34* 30* 27* 26*   CREATININE mg/dL 0.79 0.76 0.66* 0.71*   GLUCOSE mg/dL 142* 134* 150* 135*   EGFR mL/min/1.73 85.4 86.5 90.2 88.2       Results from last 7 days   Lab Units 12/30/22  0639 12/29/22  0646 12/27/22  1513 12/27/22  0621 12/26/22  0637 12/25/22  0341 12/24/22  1535   CALCIUM mg/dL 9.7 9.2  --  9.4 8.9   < >  --    MAGNESIUM mg/dL  --   --  2.2  --   --   --  2.3    < > = values in this interval not displayed.     Results from last 7 days   Lab Units 12/29/22  0646   PROCALCITONIN ng/mL 0.05     Glucose   Date/Time Value Ref Range Status   12/28/2022 1603 112 70 - 130 mg/dL Final     Comment:     Meter: [id number] : [id number] Pierce Mitchell NA   12/28/2022 1231 122 70 - 130 mg/dL Final     Comment:     Meter: [id number] : [id number] Hatchett Sherrish NA   12/28/2022 0650 152 (H) 70 - 130 mg/dL Final     Comment:     Meter: [id number] : [id number] Brandon Whitten CNA   12/28/2022 0012 148 (H) 70 - 130 mg/dL Final     Comment:     Meter: [id number] : [id number] Brandon Whitten CNA       XR Chest 1 View    Result Date: 12/29/2022  Bilateral hilar prominence with similar appearance to exam 9 days ago. No evidence for superimposed airspace disease or pulmonary edema or pleural effusion.  This report was finalized on 12/29/2022 9:24 AM by Dr. Saud Delatorre M.D.      Scheduled Medications  amLODIPine, 10 mg, Oral, Daily  digoxin, 250 mcg, Intravenous, Daily  ipratropium-albuterol, 3 mL, Nebulization, 4x Daily - RT  levETIRAcetam, 500 mg, Intravenous, Q12H  lisinopril, 10 mg, Oral, Q24H  melatonin, 5 mg, Oral, Nightly  metoprolol tartrate, 25 mg, Oral, Q12H  sodium chloride, 10 mL, Intravenous, Q12H    Infusions  dextrose, 75 mL/hr, Last Rate: 75 mL/hr (12/30/22 0835)  dilTIAZem HCl-Sodium Chloride, 5-15 mg/hr, Last Rate: 10 mg/hr (12/30/22 1527)  sodium chloride, 30 mL/hr    Diet  NPO Diet NPO Type: Strict NPO      "  Assessment/Plan     Active Hospital Problems    Diagnosis  POA   • **Subdural hematoma [S06.5XAA]  Yes   • Atrial flutter (HCC) [I48.92]  No   • Encephalopathy [G93.40]  Unknown   • Hyperglycemia [R73.9]  Unknown   • Oropharyngeal dysphagia [R13.12]  Unknown   • Benign essential hypertension [I10]  Yes      Resolved Hospital Problems   No resolved problems to display.       88 y.o. male admitted with Subdural hematoma status post craniotomy 12/20    Confusion/encephalopathy-suspect metabolic cause or potentially just delirium related to brain surgery and ICU/hospitalization  -IM Zyprexa available as needed for agitation  - WBC up but no overt infectious source and afebrile. CXR, UA, procal and Doppler all normal.    - Repeat CT 12/28 with decrease in the size of the bleeding areas but some increase in hygroma    A flutter with uncontrolled rate.  Improved   - Diltiazem drip per cardiology.    -Cannot start AC until cleared by neurosurgery    Dysphagia related to above.  Failed repeat speech study.  Discussed again with POA at bedside.  We had a long talk about goals of care and long-term prognosis.  Overall since he just had the surgery and nursing and PT notes indicate that he is making progress with his strength, I think reasonable to place PEG tube in the hopes that he will continue to improve and will be able to swallow in the future.  He stated that his friend had told him he did not want to have a long-term feeding tube if he was \"a vegetable\" but he agree that since patient was still communicative and has some hope of being able to eat that we should proceed with feeding tube placement.      Hypernatremia-started dextrose fluids until tube feeds started      SCDs for DVT prophylaxis.  Disposition timing uncertain at this time.  Will certainly need rehab      Edgar Ye MD  [facility]  12/30/22  17:18 EST    "

## 2022-12-30 NOTE — ANESTHESIA POSTPROCEDURE EVALUATION
"Patient: Mingo Aquino    Procedure Summary     Date: 12/30/22 Room / Location: [facility] ENDOSCOPY 10 /  [facility]    Anesthesia Start: 1019 Anesthesia Stop: 1106    Procedure: ESOPHAGOGASTRODUODENOSCOPY WITH PERCUTANEOUS ENDOSCOPIC GASTROSTOMY TUBE INSERTION (Esophagus) Diagnosis:       Oropharyngeal dysphagia      (Oropharyngeal dysphagia [R13.12])    Surgeons: Bhupinder Theodore Jr., MD Provider: Clayton Andrade MD    Anesthesia Type: MAC ASA Status: 4          Anesthesia Type: MAC    Vitals  Vitals Value Taken Time   /80 12/30/22 1123   Temp 36.7 °C (98.1 °F) 12/30/22 1109   Pulse 93 12/30/22 1123   Resp 16 12/30/22 1123   SpO2 98 % 12/30/22 1123           Post Anesthesia Care and Evaluation    Patient location during evaluation: bedside  Patient participation: complete - patient participated  Level of consciousness: sleepy but conscious  Pain score: 0  Pain management: adequate    Airway patency: patent  Anesthetic complications: No anesthetic complications    Cardiovascular status: acceptable  Respiratory status: acceptable  Hydration status: acceptable    Comments: /80 (BP Location: Right arm, Patient Position: Sitting)   Pulse 93   Temp 36.7 °C (98.1 °F)   Resp 16   Ht 182.9 cm (72\")   Wt 76.7 kg (169 lb)   SpO2 98%   BMI 22.92 kg/m²         "

## 2022-12-30 NOTE — PLAN OF CARE
Problem: Restraint, Nonbehavioral (Nonviolent)  Goal: Absence of Harm or Injury  Outcome: Ongoing, Progressing  Intervention: Implement Least Restrictive Safety Strategies  Recent Flowsheet Documentation  Taken 12/30/2022 1800 by Magdalene Peña RN  Medical Device Protection:   torso covered   tubing secured  Less Restrictive Alternative:   bed alarm in use   calming techniques promoted   counseling provided   emotional support provided   safety enhancements provided   positive reinforcement provided   sensory stimulation limited  De-Escalation Techniques:   verbally redirected   time out facilitated   stimulation decreased  Diversional Activities: television  Taken 12/30/2022 1600 by Magdalene Peña RN  Medical Device Protection:   torso covered   tubing secured  Less Restrictive Alternative:   bed alarm in use   calming techniques promoted   counseling provided   emotional support provided   safety enhancements provided   positive reinforcement provided   sensory stimulation limited  De-Escalation Techniques:   verbally redirected   stimulation decreased   time out facilitated   reoriented  Diversional Activities: television  Taken 12/30/2022 1400 by Magdalene Peña RN  Medical Device Protection:   torso covered   tubing secured  Less Restrictive Alternative:   bed alarm in use   calming techniques promoted   counseling provided   emotional support provided   safety enhancements provided   positive reinforcement provided   sensory stimulation limited  De-Escalation Techniques:   verbally redirected   time out facilitated   stimulation decreased   reoriented  Diversional Activities: television  Taken 12/30/2022 1200 by Magdalene Peña RN  Medical Device Protection:   torso covered   tubing secured  Less Restrictive Alternative:   bed alarm in use   calming techniques promoted   counseling provided   emotional support provided   safety enhancements provided   positive reinforcement provided   sensory  stimulation limited  De-Escalation Techniques:   verbally redirected   time out facilitated   stimulation decreased  Diversional Activities: television  Taken 12/30/2022 0819 by Magdalene Peña RN  Medical Device Protection:   torso covered   tubing secured  Less Restrictive Alternative:   bed alarm in use   calming techniques promoted   counseling provided   emotional support provided   safety enhancements provided   positive reinforcement provided   sensory stimulation limited  De-Escalation Techniques:   verbally redirected   time out facilitated   stimulation decreased  Diversional Activities: television  Taken 12/30/2022 0800 by aMgdalene Peña RN  Medical Device Protection:   tubing secured   torso covered  Less Restrictive Alternative:   bed alarm in use   calming techniques promoted   counseling provided   emotional support provided   safety enhancements provided   positive reinforcement provided   sensory stimulation limited  De-Escalation Techniques:   verbally redirected   time out facilitated   stimulation decreased   reoriented  Diversional Activities: television  Intervention: Protect Dignity, Rights, and Personal Wellbeing  Recent Flowsheet Documentation  Taken 12/30/2022 0800 by Magdalene Peña RN  Trust Relationship/Rapport:   care explained   choices provided  Intervention: Protect Skin and Joint Integrity  Recent Flowsheet Documentation  Taken 12/30/2022 0800 by Magdalene Peña RN  Range of Motion: active ROM (range of motion) encouraged   Goal Outcome Evaluation:

## 2022-12-31 NOTE — PLAN OF CARE
Problem: Fall Injury Risk  Goal: Absence of Fall and Fall-Related Injury  Intervention: Promote Injury-Free Environment  Recent Flowsheet Documentation  Taken 12/31/2022 1600 by Amber Adrian RN  Safety Promotion/Fall Prevention:   activity supervised   room organization consistent   safety round/check completed  Taken 12/31/2022 1400 by Amber Adrian RN  Safety Promotion/Fall Prevention:   activity supervised   assistive device/personal items within reach   room organization consistent   safety round/check completed  Taken 12/31/2022 1200 by Amber Adrian RN  Safety Promotion/Fall Prevention:   activity supervised   assistive device/personal items within reach   room organization consistent   safety round/check completed  Taken 12/31/2022 1000 by Amber Adrian RN  Safety Promotion/Fall Prevention:   activity supervised   room organization consistent   safety round/check completed  Taken 12/31/2022 0859 by Amber Adrian RN  Safety Promotion/Fall Prevention:   activity supervised   assistive device/personal items within reach   room organization consistent   safety round/check completed     Problem: Hypertension Comorbidity  Goal: Blood Pressure in Desired Range  Intervention: Maintain Blood Pressure Management  Recent Flowsheet Documentation  Taken 12/31/2022 0859 by Amber Adrian RN  Medication Review/Management: medications reviewed     Problem: Restraint, Nonbehavioral (Nonviolent)  Goal: Absence of Harm or Injury  Intervention: Protect Dignity, Rights, and Personal Wellbeing  Recent Flowsheet Documentation  Taken 12/31/2022 0859 by Amber Adrian RN  Trust Relationship/Rapport: care explained     Problem: Restraint, Nonbehavioral (Nonviolent)  Goal: Absence of Harm or Injury  Intervention: Implement Least Restrictive Safety Strategies  Recent Flowsheet Documentation  Taken 12/31/2022 1600 by Amber Adrian RN  Medical Device Protection:   IV pole/bag removed from visual field   skin  sleeve   torso covered   tubing secured  Less Restrictive Alternative:   bed alarm in use   calming techniques promoted   counseling provided   environment adjusted   positive reinforcement provided   safety enhancements provided   security enhancements provided   sensory stimulation limited  De-Escalation Techniques:   appropriate behavior reinforced   reoriented   stimulation decreased  Taken 12/31/2022 1400 by Amber Adrian, RN  Medical Device Protection:   IV pole/bag removed from visual field   skin sleeve   torso covered   tubing secured  Less Restrictive Alternative:   bed alarm in use   calming techniques promoted   counseling provided   environment adjusted   positive reinforcement provided   safety enhancements provided   security enhancements provided   sensory stimulation limited  De-Escalation Techniques:   appropriate behavior reinforced   stimulation decreased  Taken 12/31/2022 1200 by Amber Adrian, RN  Medical Device Protection:   IV pole/bag removed from visual field   skin sleeve   torso covered   tubing secured  Less Restrictive Alternative:   bed alarm in use   calming techniques promoted   counseling provided   environment adjusted   positive reinforcement provided   safety enhancements provided   security enhancements provided   sensory stimulation limited  De-Escalation Techniques:   appropriate behavior reinforced   increased round frequency   quiet time facilitated   reoriented   stimulation decreased  Taken 12/31/2022 1000 by Amber Adrian, RN  Medical Device Protection:   IV pole/bag removed from visual field   skin sleeve   torso covered   tubing secured  Less Restrictive Alternative:   bed alarm in use   calming techniques promoted   counseling provided   environment adjusted   positive reinforcement provided   safety enhancements provided   security enhancements provided   sensory stimulation limited  De-Escalation Techniques:   appropriate behavior reinforced   quiet time  facilitated   stimulation decreased  Taken 12/31/2022 0859 by Amber Adrian, RN  Medical Device Protection:   IV pole/bag removed from visual field   skin sleeve   torso covered   tubing secured  Taken 12/31/2022 0800 by Amber Adrian, RN  Medical Device Protection:   IV pole/bag removed from visual field   skin sleeve   torso covered   tubing secured  Less Restrictive Alternative:   bed alarm in use   calming techniques promoted   counseling provided   environment adjusted   positive reinforcement provided   safety enhancements provided   security enhancements provided   sensory stimulation limited  De-Escalation Techniques:   appropriate behavior reinforced   diversional activity encouraged   increased round frequency   reoriented   stimulation decreased   verbally redirected  Diversional Activities:   television   music   Goal Outcome Evaluation:

## 2022-12-31 NOTE — PROGRESS NOTES
General surgery    Called to the bedside as the patient did pull out his PEG tube sometime in the last 30 minutes.  He had had tube feeds running for 3 hours.  I attempted to replace the PEG tube at the bedside.  It passed easily through the skin and fascial layer but could not get placement into the stomach.  G-tube study at the bedside confirm this.  Recommend return to the operating room for replacement of the PEG tonight due to risk of contamination from the gastrotomy.  I have attempted to call his power of  twice and left a voicemail.  Strict n.p.o. for now.    Amber Garza MD

## 2022-12-31 NOTE — PROGRESS NOTES
Chief Complaint:    S/P PEG, POD 1    Subjective:    The patient had a PEG yesterday and then remove the PEG and had to have it replaced last night.  He is stable.    Objective:    Temp:  [97.7 °F (36.5 °C)-98.5 °F (36.9 °C)] 98.5 °F (36.9 °C)  Heart Rate:  [] 85  Resp:  [15-26] 25  BP: ()/(62-97) 131/69    Physical Exam  Constitutional:       General: He is sleeping.      Appearance: He is not toxic-appearing.   Abdominal:      Comments: PEG site: Clean with no evidence of infection.         Results:    No new labs today.    Impression/Plan:    The patient is POD 1 from a PEG.  He is currently on tube feeds.  I will sign off but remain available if needed.    Bhupinder Theodore Jr., M.D.

## 2022-12-31 NOTE — PROGRESS NOTES
HOSPITAL FOLLOW UP NOTE    Patient Name: Mingo Aquino  Patient : 1934        Date of Service:22  Provider of Service: KAYCE Gonsales  Place of Service: [facility]  Referral Provider: Dianne Kramer MD          Follow Up: PVCs, atrial fibrillation    Interval Hx: VSS, resting    OBJECTIVE  Temp:  [97.7 °F (36.5 °C)-98.5 °F (36.9 °C)] 98.5 °F (36.9 °C)  Heart Rate:  [] 75  Resp:  [18-26] 24  BP: ()/(69-97) 121/73     Intake/Output Summary (Last 24 hours) at 2022 1532  Last data filed at 2022 0859  Gross per 24 hour   Intake 470 ml   Output --   Net 470 ml     Body mass index is 22.92 kg/m².      22  0607 22  1158 22  1013   Weight: 77.2 kg (170 lb 3.1 oz) 77 kg (169 lb 12.1 oz) 76.7 kg (169 lb)         Physical Exam:     General Appearance:    Alert, cooperative, in no acute distress   Head:    Normocephalic, without obvious abnormality, atraumatic   Eyes:            Conjunctivae and sclerae normal, no   icterus, no pallor, corneas clear, PERRLA   Neck:   No adenopathy, supple, trachea midline, no thyromegaly, no   carotid bruit, no JVD   Lungs:     Clear to auscultation,respirations regular, even and unlabored    Heart:    Regular rhythm and normal rate, normal S1 and S2, no murmur, no gallop, no rub, no click   Chest Wall:    No abnormalities observed   Abdomen:     Normal bowel sounds, no masses, no organomegaly, soft, nontender, nondistended, no guarding, no rebound  tenderness   Extremities:   Moves all extremities well, no edema, no cyanosis, no redness   Pulses:   Pulses palpable and equal bilaterally.          CURRENT MEDS    Scheduled Meds:amLODIPine, 10 mg, Oral, Daily  [START ON 2023] digoxin, 125 mcg, Intravenous, Daily  ipratropium-albuterol, 3 mL, Nebulization, 4x Daily - RT  levETIRAcetam, 500 mg, Intravenous, Q12H  lisinopril, 10 mg, Oral, Q24H  melatonin, 5 mg, Oral, Nightly  metoprolol tartrate, 25 mg, Oral,  Q12H  sodium chloride, 10 mL, Intravenous, Q12H      Continuous Infusions:dilTIAZem HCl-Sodium Chloride, 5-15 mg/hr, Last Rate: 5 mg/hr (12/31/22 1008)  lactated ringers, 9 mL/hr, Last Rate: Stopped (12/31/22 0907)  sodium chloride, 30 mL/hr          Lab Review:   Results from last 7 days   Lab Units 12/30/22  0639 12/29/22  0646   SODIUM mmol/L 146* 145   POTASSIUM mmol/L 4.1 4.0   CHLORIDE mmol/L 112* 112*   CO2 mmol/L 22.3 24.0   BUN mg/dL 34* 30*   CREATININE mg/dL 0.79 0.76   GLUCOSE mg/dL 142* 134*   CALCIUM mg/dL 9.7 9.2         Results from last 7 days   Lab Units 12/31/22  1518 12/30/22  0639   WBC 10*3/mm3 28.71* 16.62*   HEMOGLOBIN g/dL 14.2 14.9   HEMATOCRIT % 42.3 42.8   PLATELETS 10*3/mm3 480* 497*         Results from last 7 days   Lab Units 12/27/22  1513 12/24/22  1535   MAGNESIUM mg/dL 2.2 2.3             Results from last 7 days   Lab Units 12/27/22  1513   TSH uIU/mL 1.510     2D Echocardiogram 12/28/2022:  •  Left ventricular systolic function is hyperdynamic (EF > 70%). Left ventricular ejection fraction appears to be greater than 70%.  •  The right ventricular cavity is mildly dilated. Normal right ventricular systolic function noted.  •  The left atrial cavity is mildly dilated.  •  There is mild to moderate aortic valve calcification  •  Mild mitral valve regurgitation is present.  •  Mild tricuspid valve regurgitation is present.  •  Calculated right ventricular systolic pressure from tricuspid regurgitation is 39 mmHg.  •  There is no evidence of pericardial effusion       ASSESSMENT & PLAN    Subdural hematoma    Benign essential hypertension    Atrial flutter (HCC)    Encephalopathy    Hyperglycemia    Oropharyngeal dysphagia    1.  Subdural hematoma: Status postcraniotomy and hematoma evacuation 12/20/2022  2.  Ventricular ectopy: Holter in February 2022 showed occasional couplets and triplets.  Echo showed hyperdynamic LV function. Tolerates beta-blocker.    3.  Hypertension: BP  controlled for the most part.    4.  History of stroke  5.  History of paroxysmal SVT: Follows with Dr. Charles at [place].  Beta-blocker  6.  Atrial fibrillation/flutter: On diltiazem drip.  Stop diltiazem gtt. Restart digoxin 125 mcg daily which is half home dose via PEG    Patient pulled newly placed PEG tube out last night which had to be replaced.        Jodi Day, APRN  12/31/22

## 2022-12-31 NOTE — NURSING NOTE
Call placed to general surgery to inform them of pt peg tube being dislodged. Awaiting call back from Dr. Garza.

## 2022-12-31 NOTE — THERAPY TREATMENT NOTE
Patient Name: Mingo Aquino  : 1934    MRN: [id number]                              Today's Date: 2022       Admit Date: 2022    Visit Dx:     ICD-10-CM ICD-9-CM   1. Subdural hematoma  S06.5XAA 432.1   2. 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)   • History of CVA (cerebrovascular accident)   • Lower urinary tract symptoms (LUTS)   • History of prostate cancer   • Subdural hematoma   • Atrial flutter (HCC)   • Encephalopathy   • Hyperglycemia   • Oropharyngeal dysphagia     Past Medical History:   Diagnosis Date   • Arrhythmia    • Cancer (HCC)     PROSTATE   • Colon polyps 2010    cecum: hyperplastic polyp; descending colon: hyperplastic polyp; sigmoid polyp: tubular adenoma; rectal polyps x2; hyerplastic polyps   • CVA (cerebral vascular accident) (HCC)    • Diverticulosis    • Hypertension    • Stroke (HCC)      Past Surgical History:   Procedure Laterality Date   • COLONOSCOPY N/A 2010    Pancolonic diverticular disease as well as five small sessile colon polyps-Dr. Cain Rush   • COLONOSCOPY N/A 2018    Procedure: COLONOSCOPY to CECUM;  Surgeon: Camacho Collier MD;  Location: [facility] ENDOSCOPY;  Service: Gastroenterology   • CRANIOTOMY FOR SUBDURAL HEMATOMA Left 2022    Procedure: CRANIOTOMY FOR SUBDURAL HEMATOMA;  Surgeon: Shawn Mccullough MD;  Location: [facility] MAIN OR;  Service: Neurosurgery;  Laterality: Left;   • INGUINAL HERNIA REPAIR Left 2006    Open repair of left inguinal hernia with mesh-Dr. Kofi Bravo   • NASAL SINUS SURGERY Bilateral 2017    Septoplasty, submucous  resection, bilateral endoscopic image-guided anterior-posterior ethmoidectomy, bilateral endoscopic image-guided sphenoidotomy, bilateral image-guided endoscopic maxillary antrostomy (with removal of tissue, right axillary sinus), bilateral endosopic image-guided nasofrontal duct dissection, left endoscopic enrique bullosa resection-Dr. Petar Luque   • PROSTATECTOMY        General Information     Row Name 12/31/22 1217          Physical Therapy Time and Intention    Document Type therapy note (daily note)  -EB     Mode of Treatment individual therapy;physical therapy  -EB     Row Name 12/31/22 1217          General Information    Existing Precautions/Restrictions fall  left crani NPO  -EB     Row Name 12/31/22 1217          Cognition    Orientation Status (Cognition) oriented to;person;unable/difficult to assess  -EB     Row Name 12/31/22 1217          Safety Issues, Functional Mobility    Impairments Affecting Function (Mobility) balance;cognition;endurance/activity tolerance;strength  -EB           User Key  (r) = Recorded By, (t) = Taken By, (c) = Cosigned By    Initials Name Provider Type    EB Liz Byrd PTA Physical Therapist Assistant               Mobility     Row Name 12/31/22 1218          Bed Mobility    Supine-Sit [place] (Bed Mobility) maximum assist (25% patient effort);2 person assist;nonverbal cues (demo/gesture);verbal cues  -EB     Sit-Supine [place] (Bed Mobility) moderate assist (50% patient effort);2 person assist;nonverbal cues (demo/gesture);verbal cues  -EB     Assistive Device (Bed Mobility) bed rails;head of bed elevated  -EB     Comment, (Bed Mobility) pt sat on EOB briefly with CGA/Jacqueline and started to lay back down. Encouraged pt to try to continue sitting but pt declined.  -EB           User Key  (r) = Recorded By, (t) = Taken By, (c) = Cosigned By    Initials Name Provider Type    Liz Hernandez PTA Physical Therapist Assistant               Obj/Interventions    No  documentation.                Goals/Plan    No documentation.                Clinical Impression     Row Name 12/31/22 1230          Plan of Care Review    Plan of Care Reviewed With patient  -EB     Progress improving  -EB     Outcome Evaluation Pt seen today for PT treatment. Pt requried Max assist of 2 with supine to sit and ModAX2 with sit to supine. Pt sat up briefly with Jacqueline/CGA. Pt started to lay back down, encouraged pt to stay sitting up but pt declined. Will continue to progress pt as able.  -EB     Row Name 12/31/22 1230          Therapy Assessment/Plan (PT)    Therapy Frequency (PT) 6 times/wk  -EB     Row Name 12/31/22 1230          Positioning and Restraints    Pre-Treatment Position in bed  -EB     Post Treatment Position bed  -EB     In Bed supine;call light within reach;encouraged to call for assist;exit alarm on  -EB           User Key  (r) = Recorded By, (t) = Taken By, (c) = Cosigned By    Initials Name Provider Type    Liz Hernandez PTA Physical Therapist Assistant               Outcome Measures     Row Name 12/31/22 1232 12/31/22 0859       How much help from another person do you currently need...    Turning from your back to your side while in flat bed without using bedrails? 2  -EB 3  -LC    Moving from lying on back to sitting on the side of a flat bed without bedrails? 2  -EB 2  -LC    Moving to and from a bed to a chair (including a wheelchair)? 2  -EB 2  -LC    Standing up from a chair using your arms (e.g., wheelchair, bedside chair)? 2  -EB 2  -LC    Climbing 3-5 steps with a railing? 1  -EB 1  -LC    To walk in hospital room? 1  -EB 1  -LC    AM-PAC 6 Clicks Score (PT) 10  -EB 11  -LC    Highest level of mobility 4 --> Transferred to chair/commode  -EB 4 --> Transferred to chair/commode  -LC    Row Name 12/31/22 1232          Modified [place] Scale    Modified [place] Scale 5 - Severe disability.  Bedridden, incontinent, and requiring constant nursing care and attention.  -EB            User Key  (r) = Recorded By, (t) = Taken By, (c) = Cosigned By    Initials Name Provider Type    LC Amber Adrian, RN Registered Nurse    Liz Hernandez PTA Physical Therapist Assistant                             Physical Therapy Education     Title: PT OT SLP Therapies (Done)     Topic: Physical Therapy (Done)     Point: Mobility training (Done)     Learning Progress Summary           Patient Acceptance, E, VU by EB at 12/31/2022 1233    Acceptance, E, VU,NR by  at 12/26/2022 1510    Acceptance, E, NR by TS at 12/25/2022 1508    Eager, E, NR by KS at 12/24/2022 1553    Eager, E, NR by KS at 12/23/2022 1936    Acceptance, E, NR by  at 12/23/2022 1431   Other Acceptance, E, VU,NR by  at 12/26/2022 1510                   Point: Home exercise program (Done)     Learning Progress Summary           Patient Acceptance, E, VU by EB at 12/31/2022 1233    Acceptance, E, NR by TS at 12/25/2022 1508    Eager, E, NR by KS at 12/24/2022 1553    Eager, E, NR by KS at 12/23/2022 1936    Acceptance, E, NR by  at 12/23/2022 1431                               User Key     Initials Effective Dates Name Provider Type Discipline     06/16/21 -  Lidia Gamble, PT Physical Therapist PT     06/16/21 -  Liz Byrd PTA Physical Therapist Assistant PT     06/16/21 -  Paulina Sauer RN Registered Nurse Nurse     06/16/21 -  Sahara Soto, ELANA Physical Therapist PT    KS 09/22/22 -  Madelyn Dias RN Registered Nurse Nurse              PT Recommendation and Plan     Plan of Care Reviewed With: patient  Progress: improving  Outcome Evaluation: Pt seen today for PT treatment. Pt requried Max assist of 2 with supine to sit and ModAX2 with sit to supine. Pt sat up briefly with Jacqueline/CGA. Pt started to lay back down, encouraged pt to stay sitting up but pt declined. Will continue to progress pt as able.     Time Calculation:    PT Charges     Row Name 12/31/22 1216             Time Calculation    Start  Time 1043  -EB      Stop Time 1054  -EB      Time Calculation (min) 11 min  -EB      PT Received On 12/31/22  -EB      PT - Next Appointment 01/01/23  -EB         Time Calculation- PT    Total Timed Code Minutes- PT 11 minute(s)  -EB            User Key  (r) = Recorded By, (t) = Taken By, (c) = Cosigned By    Initials Name Provider Type    EB Liz Byrd PTA Physical Therapist Assistant              Therapy Charges for Today     Code Description Service Date Service Provider Modifiers Qty    [id number]  PT THERAPEUTIC ACT EA 15 MIN 12/31/2022 Liz Byrd PTA GP 1    00862065934 HC PT THER SUPP EA 15 MIN 12/31/2022 Liz Byrd PTA GP 1          PT G-Codes  Outcome Measure Options: AM-PAC 6 Clicks Basic Mobility (PT), Modified [place]  AM-PAC 6 Clicks Score (PT): 10  Modified Louise Scale: 5 - Severe disability.  Bedridden, incontinent, and requiring constant nursing care and attention.       Liz Byrd PTA  12/31/2022

## 2022-12-31 NOTE — ANESTHESIA PREPROCEDURE EVALUATION
Anesthesia Evaluation                  Airway   Mallampati: II  Dental      Pulmonary    (-) sleep apnea, not a smoker    ROS comment: Negative patient screen for HALLIE    Cardiovascular     (+) hypertension, dysrhythmias Atrial Flutter,       Neuro/Psych  (+) CVA,    GI/Hepatic/Renal/Endo    (+)   hepatitis (unsure of Hepatitis status) B,     Musculoskeletal     Abdominal    Substance History      OB/GYN          Other                        Anesthesia Plan    ASA 4 - emergent     general     (Unable to contact POA for consent.)          CODE STATUS:    Code Status (Patient has no pulse and is not breathing): CPR (Attempt to Resuscitate)  Medical Interventions (Patient has pulse or is breathing): Full Support  Release to patient: Routine Release

## 2022-12-31 NOTE — ANESTHESIA POSTPROCEDURE EVALUATION
"Patient: Mingo Aquino    Procedure Summary     Date: 12/30/22 Room / Location: [facility] OR [address] [place] [address] MAIN OR    Anesthesia Start: 2343 Anesthesia Stop: 12/31/22 0027    Procedures:       ESOPHAGOGASTRODUODENOSCOPY (Esophagus)      PERCUTANEOUS ENDOSCOPIC GASTROSTOMY TUBE INSERTION (Esophagus) Diagnosis:     Surgeons: Amber Garza MD Provider: Andry Sanches MD    Anesthesia Type: general ASA Status: 4 - Emergent          Anesthesia Type: general    Vitals  Vitals Value Taken Time   /97 12/31/22 0031   Temp 36.8 °C (98.3 °F) 12/31/22 0021   Pulse 89 12/31/22 0042   Resp 24 12/31/22 0030   SpO2 95 % 12/31/22 0042   Vitals shown include unvalidated device data.        Post Anesthesia Care and Evaluation    Pain management: adequate    Airway patency: patent  Anesthetic complications: No anesthetic complications    Cardiovascular status: acceptable  Respiratory status: acceptable  Hydration status: acceptable    Comments: /97 (BP Location: Left arm, Patient Position: Lying)   Pulse 94   Temp 36.8 °C (98.3 °F) (Oral)   Resp 24   Ht 182.9 cm (72\")   Wt 76.7 kg (169 lb)   SpO2 96%   BMI 22.92 kg/m²         "

## 2022-12-31 NOTE — PLAN OF CARE
Problem: Restraint, Nonbehavioral (Nonviolent)  Goal: Absence of Harm or Injury  Intervention: Implement Least Restrictive Safety Strategies  Recent Flowsheet Documentation  Taken 12/31/2022 0400 by Raquel Abdul RN  Medical Device Protection:   IV pole/bag removed from visual field   torso covered   tubing secured  Less Restrictive Alternative:   1:1 observation maintained   bed alarm in use   calming techniques promoted   emotional support provided   safety enhancements provided   sensory stimulation limited  De-Escalation Techniques:   verbally redirected   stimulation decreased   quiet time facilitated   increased round frequency  Diversional Activities: other (see comments)  Taken 12/31/2022 0200 by Raquel Abdul RN  Medical Device Protection:   IV pole/bag removed from visual field   torso covered   tubing secured  Less Restrictive Alternative:   1:1 observation maintained   bed alarm in use   calming techniques promoted   emotional support provided   safety enhancements provided   sensory stimulation limited  De-Escalation Techniques:   quiet time facilitated   increased round frequency   verbally redirected   stimulation decreased  Diversional Activities: other (see comments)  Taken 12/30/2022 2200 by Raquel Abdul RN  Medical Device Protection:   IV pole/bag removed from visual field   torso covered   tubing secured  Less Restrictive Alternative:   bed alarm in use   calming techniques promoted   counseling provided   emotional support provided   safety enhancements provided   positive reinforcement provided   sensory stimulation limited  De-Escalation Techniques:   stimulation decreased   increased round frequency  Diversional Activities: television  Taken 12/30/2022 2000 by Raquel Abdul RN  Medical Device Protection:   IV pole/bag removed from visual field   torso covered   tubing secured  Less Restrictive Alternative:   bed alarm in use   calming techniques promoted    counseling provided   emotional support provided   safety enhancements provided   positive reinforcement provided   sensory stimulation limited  De-Escalation Techniques:   stimulation decreased   increased round frequency   verbally redirected   quiet time facilitated  Diversional Activities: television   Goal Outcome Evaluation:      Pt very confused and slightly combative this shift. PEG tube dislodged, replaced during surgery. Unable to discontinue restraints at this time.

## 2022-12-31 NOTE — PLAN OF CARE
Goal Outcome Evaluation:  Plan of Care Reviewed With: patient        Progress: improving  Outcome Evaluation: Pt seen today for PT treatment. Pt requried Max assist of 2 with supine to sit and ModAX2 with sit to supine. Pt sat up briefly with Jacqueline/CGA. Pt started to lay back down, encouraged pt to stay sitting up but pt declined. Will continue to progress pt as able.

## 2022-12-31 NOTE — ANESTHESIA PROCEDURE NOTES
Airway  Urgency: elective    Date/Time: 12/30/2022 11:47 PM  Airway not difficult    General Information and Staff    Patient location during procedure: OR  Anesthesiologist: Andry Sanches MD  CRNA/CAA: Maribel Bhatt CRNA    Indications and Patient Condition  Indications for airway management: airway protection    Preoxygenated: yes  Mask difficulty assessment: 0 - not attempted    Final Airway Details  Final airway type: endotracheal airway      Successful airway: ETT  Cuffed: yes   Successful intubation technique: video laryngoscopy and RSI  Facilitating devices/methods: intubating stylet and cricoid pressure  Endotracheal tube insertion site: oral  Blade: CMAC  Blade size: D  ETT size (mm): 7.5  Cormack-Lehane Classification: grade I - full view of glottis  Placement verified by: chest auscultation and capnometry   Cuff volume (mL): 8  Measured from: teeth  ETT/EBT  to teeth (cm): 21  Number of attempts at approach: 1  Assessment: lips, teeth, and gum same as pre-op and atraumatic intubation

## 2022-12-31 NOTE — PROGRESS NOTES
Name: Mingo Aquino ADMIT: 2022   : 1934  PCP: Adolfo Adame MD    MRN: [id number] LOS: 11 days   AGE/SEX: 88 y.o. male  ROOM: [facility]     Patient new to me       Interval history: Had PEG tube placement yesterday but had to have repeat surgery as patient removed and it was unable to be placed at bedside.      Subjective   Subjective   Waxing and waning confusion.  Currently in restraints.  RN reports he has had increased weakness, decreased interaction and more confusion today.  Interactive today and RN has noted periods of tachypnea followed by periods of apnea and sonorous breathing.  Telemetry has shown increase in ventricular ectopy 7P run earlier along with 26 seconds of V bigeminy.    Review of Systems   Unable to perform ROS: Mental status change      Patient not able to provide much in the way of review of systems.     Objective   Objective   Vital Signs  Temp:  [97.7 °F (36.5 °C)-98.5 °F (36.9 °C)] 98.5 °F (36.9 °C)  Heart Rate:  [] 75  Resp:  [18-26] 24  BP: ()/(69-97) 121/73  SpO2:  [90 %-98 %] 95 %  on   ;   Device (Oxygen Therapy): room air  Body mass index is 22.92 kg/m².       Physical Exam  Vitals reviewed.   Constitutional:       General: He is not in acute distress.     Appearance: He is ill-appearing.      Comments: Drowsy but easily aroused   Eyes:      General: No scleral icterus.     Conjunctiva/sclera: Conjunctivae normal.   Cardiovascular:      Rate and Rhythm: Tachycardia present. Rhythm irregular.      Pulses: Normal pulses.      Heart sounds: Murmur heard.      Comments: Appears a fib - flutter on monitor rate 110-120  Pulmonary:      Effort: Pulmonary effort is normal. No respiratory distress.      Breath sounds: No wheezing.      Comments: Periods of tachypnea followed by periods of apnea noted while at bedside.  At times sonorous breathing.  SPO2 91% on room air.  Abdominal:      General: Bowel sounds are normal. There is no distension.       Palpations: Abdomen is soft.      Tenderness: There is no abdominal tenderness.   Musculoskeletal:      Right lower leg: No edema.      Left lower leg: No edema.   Skin:     General: Skin is warm and dry.      Findings: No rash.      Comments: Craniotomy incision looks intact   Neurological:      Mental Status: He is disoriented.      Motor: Weakness present.      Comments: Is able to track some with his eyes however full neuro exam difficult to obtain due to his altered mental status.  He does follow some commands is able to perform hand grasps.  Weak bilaterally left weaker than right.   Psychiatric:      Comments: Anxious         Results Review     I reviewed the patient's new clinical results.  Results from last 7 days   Lab Units 12/31/22  1518 12/30/22  0639 12/29/22  0646 12/27/22 0621   WBC 10*3/mm3 28.71* 16.62* 16.76* 14.03*   HEMOGLOBIN g/dL 14.2 14.9 14.1 13.1   PLATELETS 10*3/mm3 480* 497* 456* 389     Results from last 7 days   Lab Units 12/31/22  1518 12/30/22  0639 12/29/22  0646 12/27/22  0621   SODIUM mmol/L 149* 146* 145 139   POTASSIUM mmol/L 4.4 4.1 4.0 3.8   CHLORIDE mmol/L 116* 112* 112* 109*   CO2 mmol/L 26.0 22.3 24.0 23.0   BUN mg/dL 47* 34* 30* 27*   CREATININE mg/dL 1.03 0.79 0.76 0.66*   GLUCOSE mg/dL 171* 142* 134* 150*   EGFR mL/min/1.73 69.9 85.4 86.5 90.2       Results from last 7 days   Lab Units 12/31/22  1518 12/30/22  0639 12/29/22  0646 12/27/22  1513 12/27/22 0621   CALCIUM mg/dL 9.6 9.7 9.2  --  9.4   MAGNESIUM mg/dL 2.7*  --   --  2.2  --      Results from last 7 days   Lab Units 12/29/22 0646   PROCALCITONIN ng/mL 0.05     Glucose   Date/Time Value Ref Range Status   12/28/2022 1603 112 70 - 130 mg/dL Final     Comment:     Meter: [id number] : [id number] Pierce Mitchell DANIELLE       Echo 12/28/22     Interpretation Summary       •  Left ventricular systolic function is hyperdynamic (EF > 70%). Left ventricular ejection fraction appears to be greater than 70%.  •  The  right ventricular cavity is mildly dilated. Normal right ventricular systolic function noted.  •  The left atrial cavity is mildly dilated.  •  There is mild to moderate aortic valve calcification  •  Mild mitral valve regurgitation is present.  •  Mild tricuspid valve regurgitation is present.  •  Calculated right ventricular systolic pressure from tricuspid regurgitation is 39 mmHg.  •  There is no evidence of pericardial effusion        XR Chest 1 View    Result Date: 12/31/2022  Negative chest radiograph.  This report was finalized on 12/31/2022 3:20 PM by Dr. Charles Dinh M.D.      XR Abdomen KUB    Result Date: 12/30/2022  FINDINGS AND IMPRESSION: A percutaneous catheter overlies the midline of the abdomen. After injection of the catheter with 30 mL of water-soluble Gastrografin, contrast does not outline the stomach with contrast filling throughout the peritoneal cavity. The tip of the listed gastrostomy tube is therefore not located within the stomach.  This report was finalized on 12/30/2022 11:17 PM by Dr. Kostas Ayala M.D.      Scheduled Medications  amLODIPine, 10 mg, Oral, Daily  [START ON 1/1/2023] digoxin, 125 mcg, Oral, Daily  ipratropium-albuterol, 3 mL, Nebulization, 4x Daily - RT  levETIRAcetam, 500 mg, Intravenous, Q12H  lisinopril, 10 mg, Oral, Q24H  melatonin, 5 mg, Oral, Nightly  metoprolol tartrate, 25 mg, Oral, Q12H  sodium chloride, 10 mL, Intravenous, Q12H    Infusions  lactated ringers, 9 mL/hr, Last Rate: Stopped (12/31/22 0907)  sodium chloride, 30 mL/hr    Diet  NPO Diet NPO Type: Strict NPO    Estimated Creatinine Clearance: 53.8 mL/min (by C-G formula based on SCr of 1.03 mg/dL).       Assessment/Plan     Active Hospital Problems    Diagnosis  POA   • **Subdural hematoma [S06.5XAA]  Yes   • Atrial flutter (HCC) [I48.92]  No   • Encephalopathy [G93.40]  Unknown   • Hyperglycemia [R73.9]  Unknown   • Oropharyngeal dysphagia [R13.12]  Unknown   • Benign essential hypertension [I10]   Yes      Resolved Hospital Problems   No resolved problems to display.       88 y.o. male admitted with Subdural hematoma status post craniotomy 12/20    Confusion/encephalopathy-suspect metabolic cause or potentially just delirium related to brain surgery and ICU/hospitalization  -IM Zyprexa available as needed for agitation  - WBC up but no overt infectious source and afebrile.  12/29/2022 CXR, UA, procal and Doppler all normal.    - Repeat CT 12/28 with decrease in the size of the bleeding areas but some increase in hygroma  -RN has been caring for him for several days feels he is more confused today and is having worsening breathing.  SPO2 91%.  -Pulmonary reconsulted, stat ABGs and chest x-ray.  Chest x-ray was ordered stat shows no acute process.  Appreciate pulmonary's input.  They have ordered stat CT.   neurosurgery has been reconsulted.  -elevated blood pressure earlier this morning with pressures 191/86 around 6:30 AM and received as needed hydralazine IV.  By 0859 blood pressure down to 131/69..  Blood pressure currently 121/73.  But was able to receive amlodipine and lisinopril via PEG tube this morning but for the last several days prior to PEG tube placement was able to receive amlodipine or lisinopril due to lack of access.   Has been on Cardizem drip per cardiology for rate control  -Last pain medication was given around 1032 PM last night    A fib- flutter with uncontrolled rate/ventricular ectopy  - Diltiazem and dig per cardiology.    -7 beat run earlier today of multifocal wide-complex tachycardia along with 26 seconds of ventricular bigeminy.  Check stat BMP and mag.  I have asked bedside RNs to contact cardiology to make them aware of increased ventricular ectopy.  -Dig level elevated today noted plans by cardiology to stop Cardizem drip and decrease dose of IV dig from 250mcg to 125 mcg via PEG.  -Cannot start AC until cleared by neurosurgery,   -TSH noted to be normal 12/27/2022  -Stat BMP  revealed normal potassium.  Mag slightly elevated at 2.7.    Dysphagia related to above.    -Failed repeat speech study.     -Underwent PEG placement yesterday but had to have repeat replacement after he pulled it out late last night.  -Had been up to 30 mL/h on tube feed and was tolerating the feeding well however Will hold for now until neurosurgery can reeval.    Hypernatremia  -Sodium 149.  Was 146 yesterday.  Had a period where he was off of fluids and off tube feeds.  We will restart dextrose fluids until tube feeds are safe to resume once neurosurgery evaluates.    Leukocytosis:  -  WBC continues to trend up up to 28.71 today up from 16.62.  Up but no overt infectious source and patient remains afebrile.  12/29/2022 CXR, UA, procal and Doppler all normal.    -chest x-ray this afternoon negative for acute process  -Recheck Pro-Andrés  -Increase in WBC today likely reactive from surgery yesterday.  Await procal. Trend.  CBC in a.m.        SCDs for DVT prophylaxis.  Disposition timing uncertain at this time.  Will need rehab      KAYCE Camargo  [facility]  12/31/22  16:01 EST     Addendum: CBC revealed WBC 28.71 (16.62),  normal H/H, plt 480,000 (497,000), procal elevated at 0.31. BMP  Revealed glucose 171, Na 149, chloride 116 (112) with BUN 47 (34) with creatinine 1.03 (0.79). Will order lactate.  Parox afib/flutter with RVR noted off and on back to 12/29.  cardiology has been managing.   -129/70's.  Check lactate. Leukocytosis could be reactive from to back to back surgeries last night for original PEG tube placement with redo after pt pulled it out. Night shift RN reports that he had stomach contents/ tube feeding coming through G tube area after he self removed.  He is at high risk for sepsis. Continue strict NPO.    Will start zosyn and trend WBC.Await neurosurgery input regarding head CT imaging which showed interval decrease in density in epidural collection but slight  increase in thickness by about 2 mm and left frontal subdural collection shows interval decrease in density.  Chest xray showing no acute findings.   I spoke with MARNI Shultz his POA and updated him on his clinical decline today. Prognosis guarded.  At this point POA has agreed to DNI.  He has agreed to meet tomorrow morning and will further discuss goals of care with him then. Mild acidosis noted on ABG's. Await pulmonary's evaluation.    - KAYCE Meehan

## 2022-12-31 NOTE — NURSING NOTE
Call placed to POA to inform them of pt going to surgery for placement peg tube with no answer. Will attempt to notify again.   Current Discharge Medication List  
  
START taking these medications Dose & Instructions Dispensing Information Comments Morning Noon Evening Bedtime  
 levoFLOXacin 250 mg tablet Commonly known as:  [place] Your last dose was: Your next dose is:    
   
   
 Dose:  250 mg Take 1 Tab by mouth daily for 3 days. Quantity:  3 Tab Refills:  0  
     
   
   
   
  
 levothyroxine 25 mcg tablet Commonly known as:  SYNTHROID Your last dose was: Your next dose is:    
   
   
 Dose:  25 mcg Take 1 Tab by mouth Daily (before breakfast). Quantity:  30 Tab Refills:  0 CONTINUE these medications which have NOT CHANGED Dose & Instructions Dispensing Information Comments Morning Noon Evening Bedtime  
 acetaminophen 325 mg tablet Commonly known as:  TYLENOL Your last dose was: Your next dose is:    
   
   
 Dose:  650 mg Take 650 mg by mouth every six (6) hours as needed for Pain. Refills:  0  
     
   
   
   
  
 CALCIUM 500+D 500 mg(1,250mg) -200 unit per tablet Generic drug:  calcium-vitamin D Your last dose was: Your next dose is:    
   
   
 Dose:  1 Tab Take 1 Tab by mouth two (2) times daily (with meals). Refills:  0 CARDIZEM  mg ER capsule Generic drug:  dilTIAZem CD Your last dose was: Your next dose is:    
   
   
 Dose:  120 mg Take 120 mg by mouth daily. Refills:  0  
     
   
   
   
  
 FOSAMAX 70 mg tablet Generic drug:  alendronate Your last dose was: Your next dose is:    
   
   
 Dose:  70 mg Take 70 mg by mouth every Monday. Refills:  0  
     
   
   
   
  
 loperamide 2 mg capsule Commonly known as:  IMODIUM Your last dose was: Your next dose is:    
   
   
 Dose:  2 mg Take 2 mg by mouth every eight (8) hours as needed for Diarrhea. Refills:  0 senna-docusate 8.6-50 mg per tablet Commonly known as:  Lanney Brunner Your last dose was: Your next dose is:    
   
   
 Dose:  1 Tab Take 1 Tab by mouth daily as needed for Constipation. Refills:  0  
     
   
   
   
  
 THERA tablet Generic drug:  therapeutic multivitamin Your last dose was: Your next dose is:    
   
   
 Dose:  1 Tab Take 1 Tab by mouth daily. Refills:  0  
     
   
   
   
  
 triamterene-hydroCHLOROthiazide 37.5-25 mg per tablet Commonly known as:  Noe Mas Your last dose was: Your next dose is:    
   
   
 Dose:  1 Tab Take 1 Tab by mouth daily. Refills:  0 XARELTO 15 mg Tab tablet Generic drug:  rivaroxaban Your last dose was: Your next dose is:    
   
   
 Dose:  15 mg Take 15 mg by mouth daily (with lunch). Refills:  0  
     
   
   
   
  
 ZANTAC 75 75 mg tablet Generic drug:  raNITIdine Your last dose was: Your next dose is:    
   
   
 Dose:  75 mg Take 75 mg by mouth Daily (before breakfast). Refills:  0 Where to Get Your Medications These medications were sent to [address], [address], [address] [place] [address] Phone:  [phone]  
  levoFLOXacin 250 mg tablet  
 levothyroxine 25 mcg tablet

## 2022-12-31 NOTE — OP NOTE
OPERATIVE REPORT    DATE: 12/30/2022    SURGEON: Amber Garza MD    OPERATION PERFORMED: EGD with PEG tube placement    PREOPERATIVE DIAGNOSIS: Dislodged PEG tube    POSTOPERATIVE DIAGNOSIS: Same    ANESTHESIA: General    SPECIMEN: None    DRAINS: None    BLOOD LOSS: Minimal    INDICATION FOR OPERATION: Mr. Mingo Aquino is a 88 y.o. year old gentleman who underwent an EGD with PEG tube placement earlier today by Dr. Theodore.  He remove this later on this evening after tube feeds been started.  Recommendation was to return to the OR for EGD with PEG due to gastrotomy.  Multiple attempts were made to contact his POA but calls were not returned.    OPERATIVE COURSE: Sedation was administered by anesthesia.  The patient was positioned in reverse Trendelenburg position.  An endoscope was inserted through the oropharynx, down the esophagus, and into the stomach without issue. There were no signs of abnormalities of the stomach.  The duodenum was entered and the first portion was evaluated with no abnormalities.  The scope was retracted back into the stomach and the stomach was maximally insufflated.  The area on the abdominal wall 2 fingerbreadths off the left subcostal margin near the midline was identified at the site of the prior PEG tube.  There was good one-to-one motion.  There was transillumination.  1% lidocaine with epinephrine was injected into this area on the skin and subcutaneous tissues.  A needle was placed through the previously made incision into the stomach under direct visualization.  A wire was placed through the this, which was snared through the endoscope.  The endoscope was removed from the mouth.  With Seldinger technique, the PEG tube was passed over the wire into the stomach.  The wire was then withdrawn, and the bumper was placed over the PEG tube.  The location of the bumper at the skin was noted to be at 3-1/2 centimeters.  The 2 clamps were placed on the PEG tube and the PEG tube was  sutured in 2 places with a 2-0 silk suture.  An abdominal binder was placed at the end of the procedure.      Amber Garza MD   General and Endoscopic Surgery  [facility]    [address], [address]  [place], [place], [address]  P: [phone]  F: [phone]

## 2023-01-01 VITALS
SYSTOLIC BLOOD PRESSURE: 41 MMHG | HEIGHT: 72 IN | TEMPERATURE: 98.1 F | OXYGEN SATURATION: 72 % | WEIGHT: 169 LBS | BODY MASS INDEX: 22.89 KG/M2 | DIASTOLIC BLOOD PRESSURE: 25 MMHG

## 2023-01-01 PROCEDURE — 92950 HEART/LUNG RESUSCITATION CPR: CPT

## 2023-01-01 PROCEDURE — 25010000002 EPINEPHRINE 1 MG/10ML SOLUTION PREFILLED SYRINGE: Performed by: EMERGENCY MEDICINE

## 2023-01-01 PROCEDURE — 94799 UNLISTED PULMONARY SVC/PX: CPT

## 2023-01-01 RX ORDER — EPINEPHRINE 0.1 MG/ML
SYRINGE (ML) INJECTION
Status: COMPLETED | OUTPATIENT
Start: 2023-01-01 | End: 2023-01-01

## 2023-01-01 RX ORDER — LORAZEPAM 2 MG/ML
2 INJECTION INTRAMUSCULAR
Status: DISCONTINUED | OUTPATIENT
Start: 2023-01-01 | End: 2023-01-01 | Stop reason: HOSPADM

## 2023-01-01 RX ORDER — LORAZEPAM 2 MG/ML
1 INJECTION INTRAMUSCULAR
Status: DISCONTINUED | OUTPATIENT
Start: 2023-01-01 | End: 2023-01-01 | Stop reason: HOSPADM

## 2023-01-01 RX ORDER — LORAZEPAM 2 MG/ML
0.5 CONCENTRATE ORAL
Status: DISCONTINUED | OUTPATIENT
Start: 2023-01-01 | End: 2023-01-01 | Stop reason: HOSPADM

## 2023-01-01 RX ORDER — LORAZEPAM 2 MG/ML
0.5 INJECTION INTRAMUSCULAR
Status: DISCONTINUED | OUTPATIENT
Start: 2023-01-01 | End: 2023-01-01 | Stop reason: HOSPADM

## 2023-01-01 RX ORDER — ACETAMINOPHEN 650 MG/1
650 SUPPOSITORY RECTAL EVERY 4 HOURS PRN
Status: DISCONTINUED | OUTPATIENT
Start: 2023-01-01 | End: 2023-01-01 | Stop reason: HOSPADM

## 2023-01-01 RX ORDER — LORAZEPAM 2 MG/ML
2 CONCENTRATE ORAL
Status: DISCONTINUED | OUTPATIENT
Start: 2023-01-01 | End: 2023-01-01 | Stop reason: HOSPADM

## 2023-01-01 RX ORDER — DIPHENOXYLATE HYDROCHLORIDE AND ATROPINE SULFATE 2.5; .025 MG/1; MG/1
1 TABLET ORAL
Status: DISCONTINUED | OUTPATIENT
Start: 2023-01-01 | End: 2023-01-01 | Stop reason: HOSPADM

## 2023-01-01 RX ORDER — LORAZEPAM 2 MG/ML
1 CONCENTRATE ORAL
Status: DISCONTINUED | OUTPATIENT
Start: 2023-01-01 | End: 2023-01-01 | Stop reason: HOSPADM

## 2023-01-01 RX ORDER — ACETAMINOPHEN 325 MG/1
650 TABLET ORAL EVERY 4 HOURS PRN
Status: DISCONTINUED | OUTPATIENT
Start: 2023-01-01 | End: 2023-01-01 | Stop reason: HOSPADM

## 2023-01-01 RX ORDER — ACETAMINOPHEN 160 MG/5ML
650 SOLUTION ORAL EVERY 4 HOURS PRN
Status: DISCONTINUED | OUTPATIENT
Start: 2023-01-01 | End: 2023-01-01 | Stop reason: HOSPADM

## 2023-01-01 RX ADMIN — EPINEPHRINE 1 MG: 0.1 INJECTION INTRACARDIAC; INTRAVENOUS at 04:27

## 2023-01-01 RX ADMIN — EPINEPHRINE 1 MG: 0.1 INJECTION INTRACARDIAC; INTRAVENOUS at 04:24

## 2023-01-01 RX ADMIN — EPINEPHRINE 1 MG: 0.1 INJECTION INTRACARDIAC; INTRAVENOUS at 04:30

## 2023-01-01 NOTE — CODE DOCUMENTATION
Despite ROSC achieved patient respiratory status is very poor and slightly better that agonal currently.  Multiple attempts to contact POA has not been successful.  Still waiting on call back from POA.

## 2023-01-01 NOTE — PLAN OF CARE
Problem: Restraint, Nonbehavioral (Nonviolent)  Goal: Absence of Harm or Injury  Intervention: Implement Least Restrictive Safety Strategies  Recent Flowsheet Documentation  Taken 1/1/2023 0000 by Raquel Abdul RN  Medical Device Protection:   torso covered   tubing secured   IV pole/bag removed from visual field  Less Restrictive Alternative:   bed alarm in use   calming techniques promoted   counseling provided   environment adjusted   positive reinforcement provided   safety enhancements provided   security enhancements provided   sensory stimulation limited  De-Escalation Techniques:   quiet time facilitated   stimulation decreased  Diversional Activities: television  Taken 12/31/2022 2200 by Raquel Abdul, RN  Medical Device Protection:   torso covered   tubing secured   IV pole/bag removed from visual field  Less Restrictive Alternative:   bed alarm in use   calming techniques promoted   counseling provided   environment adjusted   positive reinforcement provided   safety enhancements provided   security enhancements provided   sensory stimulation limited  De-Escalation Techniques:   stimulation decreased   quiet time facilitated  Diversional Activities: television  Taken 12/31/2022 2000 by Raquel Abdul RN  Medical Device Protection:   torso covered   tubing secured   IV pole/bag removed from visual field  Less Restrictive Alternative:   bed alarm in use   calming techniques promoted   counseling provided   environment adjusted   positive reinforcement provided   safety enhancements provided   security enhancements provided   sensory stimulation limited  De-Escalation Techniques:   stimulation decreased   quiet time facilitated  Diversional Activities: television   Goal Outcome Evaluation:   Pt able to tolerate being out of restraints. Order d/c

## 2023-01-01 NOTE — DISCHARGE SUMMARY
"Discharge As      Date of Admisssion:  2022  Date of Death:  2023  Time of Death:  5:25 AM    Patient Care Team:  Adolfo Adame MD as PCP - General (Family Medicine)    Final Diagnosis:   Subdural Hematoma    Presenting Problem/History of Present Illness  Subdural hematoma [S06.5XAA]    This is an 88 year old male patient with history of stroke, on Plavix.  He presented to the hospital today for altered mental status and aphasia.     He was supposed to go with his family for lunch and they tried to contact him but they could not get all the time so his brother went to his house to check on him and found him laying in bed, incontinent and discovered that the nightstand next to the bed was broken and the lamp was on the floor.  It was unclear whether he has fallen.  Patient was not able to speak and appeared to Be confused and therefore EMS was called.  Patient is currently confused and unable to provide with history.  Most of the information was obtained from his family.     Per reports, he had an episode of severe headache described as \"the worst headache of his life\" which started last Thursday.  He called his doctor on Friday and was told that he has to go to the emergency department but instead he stayed home and started taking aspirin.     No reports of seizure.  No witnessed event of fall.     On arrival to the ED, he was hemodynamically stable.  CT of the brain showed bilateral subdural hemorrhage, worse on the left with 4 mm midline shift.    Hospital Course  Patient was a 88 y.o. male presented with subdural hematoma.  He was admitted to the ICU and underwent craniotomy left-sided for evacuation on .  Did have midline shift . he was given Keppra for seizure prophylaxis.  He had significant hypertension requiring nicardipine.  He was able to be transferred to the floor.  He continued to have confusion and encephalopathy.  He had atrial flutter and was followed by cardiology.  Patient " had continued dysphagia and required PEG placement 12/30.  He subsequently dislodged the PEG tube and had to have it replaced on the same day.  He had worsening respiratory status had code event and did have return of circulation however still exhibited agonal breathing.  He was dni.  He was transition to comfort measures and continued decline.  He passed on 1/1/2023.    Daren Cope MD  01/01/23  15:32 EST    Dictated portions using Dragon dictation software.

## 2023-01-01 NOTE — PROGRESS NOTES
Pulmonary/critical care progress note    A CODE BLUE was called on this patient.    Patient was a limited code, okay for CPR but not intubation.  ED physician originally responded to the CODE BLUE, however with a DO NOT INTUBATE order in, little could be done as patient required respiratory resuscitation.    Power of  was contacted via telephone.  He requested patient's CODE STATUS be changed to a DO NOT RESUSCITATE, DO NOT INTUBATE with a focus on comfort measures.  I spoke to power of  on the phone alongside Ginger Coker, ICU charge nurse.    Orders for comfort measures were placed.  Discussed plan of care with nursing staff, , and rapid response team.    CC 12 minutes    KAYCE Soria

## 2023-01-01 NOTE — CODE DOCUMENTATION
Spoke with POA and decision was made to make the patient CMO and allow to pass naturally.  Arranged for paola to come pray with the patient.  POA on their way to hospital.  KAYCE Lamas also spoke with POA to confirm code status.

## 2023-01-02 NOTE — PROGRESS NOTES
Case Management Discharge Note      Final Note: Pt  23 at 0525         Selected Continued Care - Discharged on 2023 Admission date: 2022 - Discharge disposition:     Destination    No services have been selected for the patient.              Durable Medical Equipment    No services have been selected for the patient.              Dialysis/Infusion    No services have been selected for the patient.              Home Medical Care    No services have been selected for the patient.              Therapy    No services have been selected for the patient.              Community Resources    No services have been selected for the patient.              Community & DME    No services have been selected for the patient.                       Final Discharge Disposition Code: 20 -

## 2023-01-09 NOTE — PROGRESS NOTES
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     Patient: Mingo Aquino        : 1934  Account: [id number]           Admit Date: 2022        How to Respond to this query:       a. Click New Note     b. Answer query within the yellow box.                c. Update the Problem List, if applicable.      If you have any questions about this query contact me at: [email or web address]     Dr. Cope,    Patient presents  after being found with altered mental status at home, and underwent evacuation of subdural hematoma ().  He is not documented to have any history of dementia.  -H&P documents \"Bilateral subdural hematoma, L>R.  Possibly traumatic secondary to anticoagulation    Brain swelling and midline shift    Encephalopathy, secondary to above\"  -Hospitalist notes (-) document \"Confusion/encephalopathy-suspect metabolic cause or potentially just delirium related to brain surgery and ICU/hospitalization.\"    -Cardiologist notes ( and later) describe mentation as waxing and waning.    During the stay, he had periods of confusion and agitation, pulling at his lines and catheter (), became more alert and oriented (-), with continued periods of confusion and agitation -, pulling out his feeding tube.  He is made comfort measures and  on .  Treatment included restraints, Zyprexa, Seroquel and Precedex drip.    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 Please utilize your independent clinical judgment when addressing the question(s) above.     This query and your response, once completed, will be entered into the legal medical record.    Sincerely,  Leatha LYNCH, RN  Clinical Documentation Integrity Program   [email or web address]

## (undated) DEVICE — ELECTRD BLD EZ CLN MOD XLNG 2.75IN

## (undated) DEVICE — TOOL MR8-9AC60M MR8 9CM ACORN 6MM 2FLT: Brand: MIDAS REX MR8

## (undated) DEVICE — KT ORCA ORCAPOD DISP STRL

## (undated) DEVICE — BNDR ABD 4PANEL 12IN MED/LG

## (undated) DEVICE — ANTIBACTERIAL UNDYED BRAIDED (POLYGLACTIN 910), SYNTHETIC ABSORBABLE SUTURE: Brand: COATED VICRYL

## (undated) DEVICE — RETR STAY HK ELAS BLNT 12MM 50PK

## (undated) DEVICE — KT PEG ENDOVIVE ENFIT SFTY PUSH 20F 1P/U

## (undated) DEVICE — TOOL MR8-F2/7TA23 MR8 F2/7CM TAPER 2.3MM: Brand: MIDAS REX MR8

## (undated) DEVICE — CANN O2 ETCO2 FITS ALL CONN CO2 SMPL A/ 7IN DISP LF

## (undated) DEVICE — SENSR O2 OXIMAX FNGR A/ 18IN NONSTR

## (undated) DEVICE — STPLR SKIN VISISTAT WD 35CT

## (undated) DEVICE — GLV SURG SENSICARE PI LF PF 7.5 GRN STRL

## (undated) DEVICE — SPNG GZ WOVN 4X4IN 12PLY 10/BX STRL

## (undated) DEVICE — PATIENT RETURN ELECTRODE, SINGLE-USE, CONTACT QUALITY MONITORING, ADULT, WITH 9FT CORD, FOR PATIENTS WEIGING OVER 33LBS. (15KG): Brand: MEGADYNE

## (undated) DEVICE — MAYFIELD® DISPOSABLE ADULT SKULL PIN (PLASTIC BASE): Brand: MAYFIELD®

## (undated) DEVICE — PK CRANI 40

## (undated) DEVICE — 1010 S-DRAPE TOWEL DRAPE 10/BX: Brand: STERI-DRAPE™

## (undated) DEVICE — THE TORRENT IRRIGATION SCOPE CONNECTOR IS USED WITH THE TORRENT IRRIGATION TUBING TO PROVIDE IRRIGATION FLUIDS SUCH AS STERILE WATER DURING GASTROINTESTINAL ENDOSCOPIC PROCEDURES WHEN USED IN CONJUNCTION WITH AN IRRIGATION PUMP (OR ELECTROSURGICAL UNIT).: Brand: TORRENT

## (undated) DEVICE — TUBING, SUCTION, 1/4" X 20', STRAIGHT: Brand: MEDLINE INDUSTRIES, INC.

## (undated) DEVICE — BITEBLOCK OMNI BLOC

## (undated) DEVICE — CANN NASL CO2 TRULINK W/O2 A/

## (undated) DEVICE — TUBING, SUCTION, 1/4" X 10', STRAIGHT: Brand: MEDLINE

## (undated) DEVICE — SMOKE EVACUATION TUBING WITH 7/8 IN TO 1/4 IN REDUCER: Brand: BUFFALO FILTER

## (undated) DEVICE — CONN TBG Y 5 IN 1 LF STRL

## (undated) DEVICE — PENCL ES MEGADINE EZ/CLEAN BUTN W/HOLSTR 10FT

## (undated) DEVICE — Device: Brand: DEFENDO AIR/WATER/SUCTION AND BIOPSY VALVE

## (undated) DEVICE — PERFORATOR CDM WO/ DURAGUARD 14/11

## (undated) DEVICE — LN SMPL CO2 SHTRM SD STREAM W/M LUER

## (undated) DEVICE — SUT NUROLON 4/0 TF18 CR8 I8IN C584D

## (undated) DEVICE — SOL ISO/ALC RUB 70PCT 4OZ

## (undated) DEVICE — GLV SURG BIOGEL LTX PF 8

## (undated) DEVICE — GLV SURG SENSICARE PI PF LF 8.5 GRN STRL

## (undated) DEVICE — DISPOSABLE BIPOLAR CABLE 12FT. (3.6M): Brand: KIRWAN

## (undated) DEVICE — ADAPT CLN BIOGUARD AIR/H2O DISP

## (undated) DEVICE — GLV SURG BIOGEL LTX PF 7 1/2